# Patient Record
Sex: FEMALE | Race: WHITE | NOT HISPANIC OR LATINO | Employment: OTHER | URBAN - METROPOLITAN AREA
[De-identification: names, ages, dates, MRNs, and addresses within clinical notes are randomized per-mention and may not be internally consistent; named-entity substitution may affect disease eponyms.]

---

## 2017-04-04 DIAGNOSIS — F32.5 MAJOR DEPRESSION IN REMISSION (H): ICD-10-CM

## 2017-04-04 NOTE — TELEPHONE ENCOUNTER
Pt is requesting the following medication    Pending Prescriptions:                       Disp   Refills    FLUoxetine (PROZAC) 20 MG capsule         90 cap*0            Sig: Take 1 capsule (20 mg) by mouth daily    Pt was last seen on 11/18/17- Pt had a physical on 11/9/17 and the medication was last refilled on that time.   Pt is due in May for a recheck on her medication in May.    Pharmacy has been selected.     Annamaria.JESSENIA Manuel ([facility])

## 2017-04-04 NOTE — TELEPHONE ENCOUNTER
Message left for Pt that a 30 day Rx refill was authorized, and pt should schedule a non fasting OV.

## 2017-05-19 ENCOUNTER — OFFICE VISIT (OUTPATIENT)
Dept: FAMILY MEDICINE | Facility: CLINIC | Age: 67
End: 2017-05-19

## 2017-05-19 VITALS
OXYGEN SATURATION: 98 % | HEIGHT: 64 IN | HEART RATE: 73 BPM | TEMPERATURE: 98.3 F | DIASTOLIC BLOOD PRESSURE: 70 MMHG | SYSTOLIC BLOOD PRESSURE: 106 MMHG

## 2017-05-19 DIAGNOSIS — F32.5 MAJOR DEPRESSION IN REMISSION (H): Primary | ICD-10-CM

## 2017-05-19 PROCEDURE — 99213 OFFICE O/P EST LOW 20 MIN: CPT | Performed by: PHYSICIAN ASSISTANT

## 2017-05-19 NOTE — NURSING NOTE
Nataly MARIN Vidhya is here today for a non fasting medication check.    Pre-Visit Screening :  Immunizations : up to date  Colonoscopy : is up to date  Mammogram : is up to date  Asthma Action Test/Plan : NA  PHQ9/GAD7 :  Completed Today  Pulse - regular  My Chart - accepts    CLASSIFICATION OF OVERWEIGHT AND OBESITY BY BMI                         Obesity Class           BMI(kg/m2)  Underweight                                    < 18.5  Normal                                         18.5-24.9  Overweight                                     25.0-29.9  OBESITY                     I                  30.0-34.9                              II                 35.0-39.9  EXTREME OBESITY             III                >40                             Patient's  BMI There is no height or weight on file to calculate BMI.  http://hin.nhlbi.nih.gov/menuplanner/menu.cgi  Questioned patient about current smoking habits.  Pt. quit smoking some time ago.  Cass Fairchild, CMA

## 2017-05-19 NOTE — PROGRESS NOTES
SUBJECTIVE:                                                    Nataly Kee is a 66 year old female who presents to clinic today for the following health issues:    Depression Followup    Status since last visit: Stable     See PHQ-9 for current symptoms.  Other associated symptoms: None    Complicating factors:   Significant life event:  No   Current substance abuse:  None  Anxiety or Panic symptoms:  No    PHQ-9  English PHQ-9   Any Language              Amount of exercise or daily activities, outside of work: Pilates reformer, strength     Problems taking medications regularly No    Medication side effects: No        ROS:  Constitutional, HEENT, cardiovascular, pulmonary, GI and  systems are negative, except as otherwise noted.    Problem list, Medication list, Allergies, and Medical/Social/Surgical histories reviewed in [facility] and updated as appropriate.  Labs reviewed in EPIC  BP Readings from Last 3 Encounters:   05/19/17 106/70   11/18/16 130/90   11/09/16 120/80    Wt Readings from Last 3 Encounters:   11/09/16 81.6 kg (179 lb 12.8 oz)   07/27/16 82.3 kg (181 lb 6.4 oz)   11/19/15 83.1 kg (183 lb 3.2 oz)                  Patient Active Problem List   Diagnosis     Pulmonary embolism and infarction (H)     Hyperlipidemia     Major depression in remission (H)     ACP (advance care planning)     Vitamin D deficiency     Health Care Home     Non morbid obesity due to excess calories     Past Surgical History:   Procedure Laterality Date     C LIGATE FALLOPIAN TUBE  1983     C VAGINAL HYSTERECTOMY  2001    Hysterectomy, Vaginal     COLONOSCOPY  4/2015    tubular adenoma     HC COLONOSCOPY THRU STOMA, DIAGNOSTIC  8/2010    3 mm polyp in descending colon/ Dr Asencio     HC REMOVAL OF TONSILS,12+ Y/O      age 20       Social History   Substance Use Topics     Smoking status: Never Smoker     Smokeless tobacco: Never Used     Alcohol use No     Family History   Problem Relation Age of Onset     HEART DISEASE Mother      " related to chemotherapy treatment; pacemaker     CANCER Mother      hodgekins disease     CEREBROVASCULAR DISEASE Mother      Respiratory Father      emphysema     HEART DISEASE Father      abdominal aneurysm     Multiple Sclerosis Sister      relapsing praveen.      Bipolar Disorder Daughter          Current Outpatient Prescriptions   Medication Sig Dispense Refill     FLUoxetine (PROZAC) 20 MG capsule Take 1 capsule (20 mg) by mouth daily 90 capsule 1     Omeprazole Magnesium (PRILOSEC OTC PO)        Multiple Vitamins-Calcium (ONE-A-DAY WOMENS FORMULA) TABS Take 1 tablet by mouth daily.       Cholecalciferol (VITAMIN D) 1000 UNIT capsule Take 1 capsule by mouth daily.       [DISCONTINUED] FLUoxetine (PROZAC) 20 MG capsule Take 1 capsule (20 mg) by mouth daily 30 capsule 0     Allergies   Allergen Reactions     No Known Drug Allergies      Recent Labs   Lab Test  11/09/16   1119  11/19/15   1040  08/14/14   0927  08/05/13   1247   12/18/10   0600  04/17/10   0200   02/19/10   0400   LDL  180*  164*  154*  145*   < >  134*  122   < >   --    HDL  45*  53  45*  45*   < >  46  44*   < >   --    TRIG  177*  187*  174*  257*   < >  126  106   < >   --    ALT   --    --    --   22   --   17  17   < >   --    CR  0.73   --   0.87  0.80   < >   --    --    < >   --    GFRESTIMATED  85   --   70  78   < >   --    --    < >   --    POTASSIUM  4.5   --   4.9  4.4   < >   --    --    < >   --    TSH   --    --    --    --    --    --    --    --   3.22    < > = values in this interval not displayed.        OBJECTIVE:                                                    /70 (BP Location: Right arm, Patient Position: Chair, Cuff Size: Adult Large)  Pulse 73  Temp 98.3  F (36.8  C) (Oral)  Ht 1.626 m (5' 4\")  LMP 09/03/2001  SpO2 98%  Breastfeeding? No   There is no height or weight on file to calculate BMI.       Mental Status Exam:   Appearance: calm  Grooming: adequately groomed  Demeanor: engaged, cooperative  Affect: " normal  Speech: Normal.  Gait:Normal.  Movements: Normal  Form of thought: Logical, Linear and Goal directed  Thought content:  Normal  Insight:Good   Judgment: Good   Cognition: Good            ASSESSMENT/PLAN:                                                    (F32.5) Major depression in remission (H)  (primary encounter diagnosis)  Plan: FLUoxetine (PROZAC) 20 MG capsule    Stable - FU 6 months at CPE.  I'm not sure why she needed to come back now based on last OV, asked PCP to talk about year rx instead of q6 months if stable.       Latesha Hilario PA-C

## 2017-05-19 NOTE — MR AVS SNAPSHOT
"              After Visit Summary   5/19/2017    Nataly Kee    MRN: [id number]           Patient Information     Date Of Birth          1950        Visit Information        Provider Department      5/19/2017 3:00 PM Latesha Hilario PA [facility]        Today's Diagnoses     Major depression in remission (H)    -  1       Follow-ups after your visit        Who to contact     If you have questions or need follow up information about today's clinic visit or your schedule please contact [facility] directly at [phone].  Normal or non-critical lab and imaging results will be communicated to you by MyChart, letter or phone within 4 business days after the clinic has received the results. If you do not hear from us within 7 days, please contact the clinic through [employer] or phone. If you have a critical or abnormal lab result, we will notify you by phone as soon as possible.  Submit refill requests through [employer] or call your pharmacy and they will forward the refill request to us. Please allow 3 business days for your refill to be completed.          Additional Information About Your Visit        MyChart Information     [employer] gives you secure access to your electronic health record. If you see a primary care provider, you can also send messages to your care team and make appointments. If you have questions, please call your primary care clinic.  If you do not have a primary care provider, please call [phone] and they will assist you.        Care EveryWhere ID     This is your Care EveryWhere ID. This could be used by other organizations to access your [place] medical records  [id number]        Your Vitals Were     Pulse Temperature Height Last Period Pulse Oximetry Breastfeeding?    73 98.3  F (36.8  C) (Oral) 1.626 m (5' 4\") 09/03/2001 98% No       Blood Pressure from Last 3 Encounters:   05/19/17 106/70   11/18/16 130/90   11/09/16 120/80 "    Weight from Last 3 Encounters:   11/09/16 81.6 kg (179 lb 12.8 oz)   07/27/16 82.3 kg (181 lb 6.4 oz)   11/19/15 83.1 kg (183 lb 3.2 oz)              Today, you had the following     No orders found for display         Where to get your medicines      These medications were sent to [place] [address] [place] [facility] [address] [facility]  [address] [facility] [address]    Hours:  Tech issues with their phone system Phone:  [phone]     FLUoxetine 20 MG capsule          Primary Care Provider Office Phone # Fax #    Nedra Lemon [phone]       [facility] [address] [facility] [address]  [facility] [address]        Thank you!     Thank you for choosing [facility]  for your care. Our goal is always to provide you with excellent care. Hearing back from our patients is one way we can continue to improve our services. Please take a few minutes to complete the written survey that you may receive in the mail after your visit with us. Thank you!             Your Updated Medication List - Protect others around you: Learn how to safely use, store and throw away your medicines at www.disposemymeds.org.          This list is accurate as of: 5/19/17  3:08 PM.  Always use your most recent med list.                   Brand Name Dispense Instructions for use    FLUoxetine 20 MG capsule    PROzac    90 capsule    Take 1 capsule (20 mg) by mouth daily       ONE-A-DAY WOMENS FORMULA Tabs      Take 1 tablet by mouth daily.       PRILOSEC OTC PO          vitamin D 1000 UNITS capsule      Take 1 capsule by mouth daily.

## 2017-05-20 ASSESSMENT — PATIENT HEALTH QUESTIONNAIRE - PHQ9: SUM OF ALL RESPONSES TO PHQ QUESTIONS 1-9: 5

## 2017-11-17 DIAGNOSIS — F32.5 MAJOR DEPRESSION IN REMISSION (H): ICD-10-CM

## 2017-11-17 NOTE — TELEPHONE ENCOUNTER
Called #30 of fluoxetine to [facility]. Please contact pt and make a 6 month fu med check appointment, non fasting.    Jenise Vogel MA

## 2017-12-14 DIAGNOSIS — F32.5 MAJOR DEPRESSION IN REMISSION (H): ICD-10-CM

## 2017-12-14 NOTE — TELEPHONE ENCOUNTER
I called the pt to inform her that her medication was sent in and I also told her she needed a CPX so we could recheck her cholesterol as well and she stated that she will make a appointment after the first of the year.

## 2017-12-14 NOTE — TELEPHONE ENCOUNTER
Latesha,    When pt was last in 5-19-17, there is a note that she could possibly do a year RX instead of 6 month.  Do you want the pt to be seen before next refill or can we send in a longer RX so she can make it to next May?    Pending Prescriptions:                       Disp   Refills    FLUoxetine (PROZAC) 20 MG capsule         90 cap*1            Sig: Take 1 capsule (20 mg) by mouth daily    This is the requested medication.    Thank you

## 2017-12-14 NOTE — TELEPHONE ENCOUNTER
I told Nataly that she is due to return for her CPE in November this year (past due).  Dr. Lemon will be out [place] so please let her know that she is due for FASTING CPE with Dr. Lemon or one of the other providers and we really need to recheck her cholesterol soon as it was high.    I will send in 90 day supply of her medication but she needs to RTC for fasting CPE before that runs out.    Thanks,  Latesha Hilario PA-C

## 2018-04-11 ENCOUNTER — TRANSFERRED RECORDS (OUTPATIENT)
Dept: FAMILY MEDICINE | Facility: CLINIC | Age: 68
End: 2018-04-11

## 2018-04-20 ENCOUNTER — OFFICE VISIT (OUTPATIENT)
Dept: FAMILY MEDICINE | Facility: CLINIC | Age: 68
End: 2018-04-20

## 2018-04-20 VITALS
DIASTOLIC BLOOD PRESSURE: 78 MMHG | WEIGHT: 167 LBS | OXYGEN SATURATION: 95 % | HEIGHT: 64 IN | SYSTOLIC BLOOD PRESSURE: 118 MMHG | TEMPERATURE: 97.8 F | BODY MASS INDEX: 28.51 KG/M2 | HEART RATE: 82 BPM

## 2018-04-20 DIAGNOSIS — F32.5 MAJOR DEPRESSION IN REMISSION (H): ICD-10-CM

## 2018-04-20 DIAGNOSIS — Z23 NEED FOR VACCINATION: ICD-10-CM

## 2018-04-20 DIAGNOSIS — Z00.00 ROUTINE HISTORY AND PHYSICAL EXAMINATION OF ADULT: Primary | ICD-10-CM

## 2018-04-20 LAB
ERYTHROCYTE [DISTWIDTH] IN BLOOD BY AUTOMATED COUNT: 12.9 %
GLUCOSE SERPL-MCNC: 91 MG/DL (ref 60–99)
HCT VFR BLD AUTO: 46.7 % (ref 35–47)
HEMOGLOBIN: 14.9 G/DL (ref 11.7–15.7)
MCH RBC QN AUTO: 29.2 PG (ref 26–33)
MCHC RBC AUTO-ENTMCNC: 31.9 G/DL (ref 31–36)
MCV RBC AUTO: 91.6 FL (ref 78–100)
PLATELET COUNT - QUEST: 294 10^9/L (ref 150–375)
RBC # BLD AUTO: 5.1 10*12/L (ref 3.8–5.2)
WBC # BLD AUTO: 6.3 10*9/L (ref 4–11)

## 2018-04-20 PROCEDURE — 80061 LIPID PANEL: CPT | Mod: 90 | Performed by: FAMILY MEDICINE

## 2018-04-20 PROCEDURE — 99397 PER PM REEVAL EST PAT 65+ YR: CPT | Mod: 25 | Performed by: FAMILY MEDICINE

## 2018-04-20 PROCEDURE — 36415 COLL VENOUS BLD VENIPUNCTURE: CPT | Performed by: FAMILY MEDICINE

## 2018-04-20 PROCEDURE — 90471 IMMUNIZATION ADMIN: CPT | Performed by: FAMILY MEDICINE

## 2018-04-20 PROCEDURE — 82947 ASSAY GLUCOSE BLOOD QUANT: CPT | Performed by: FAMILY MEDICINE

## 2018-04-20 PROCEDURE — 85027 COMPLETE CBC AUTOMATED: CPT | Performed by: FAMILY MEDICINE

## 2018-04-20 ASSESSMENT — ANXIETY QUESTIONNAIRES
IF YOU CHECKED OFF ANY PROBLEMS ON THIS QUESTIONNAIRE, HOW DIFFICULT HAVE THESE PROBLEMS MADE IT FOR YOU TO DO YOUR WORK, TAKE CARE OF THINGS AT HOME, OR GET ALONG WITH OTHER PEOPLE: NOT DIFFICULT AT ALL
6. BECOMING EASILY ANNOYED OR IRRITABLE: NOT AT ALL
7. FEELING AFRAID AS IF SOMETHING AWFUL MIGHT HAPPEN: NOT AT ALL
1. FEELING NERVOUS, ANXIOUS, OR ON EDGE: NOT AT ALL
5. BEING SO RESTLESS THAT IT IS HARD TO SIT STILL: NOT AT ALL
2. NOT BEING ABLE TO STOP OR CONTROL WORRYING: NOT AT ALL
3. WORRYING TOO MUCH ABOUT DIFFERENT THINGS: NOT AT ALL
GAD7 TOTAL SCORE: 0

## 2018-04-20 ASSESSMENT — PATIENT HEALTH QUESTIONNAIRE - PHQ9: 5. POOR APPETITE OR OVEREATING: NOT AT ALL

## 2018-04-20 NOTE — NURSING NOTE
Nataly is here for annual physical and is fasting.    Patient is here for a full physical exam.    Pre-Visit Screening :  Immunizations : is due for Tdap today   Colon Screening : is up to date-due in 2020 for next one-every 5 years   Mammogram: up to date   Asthma Action Test/Plan : No concerns   PHQ9 :  Will do PHQ-2 today   GAD7 :  No concerns   Patient's  BMI Body mass index is 28.67 kg/(m^2).  Questioned patient about current smoking habits.  Pt. has never smoked.  OK to leave a detailed voice message regarding today's visit Yes, phone # [phone]      ETOH screening:  Questions:  1-How often do you have a drink containing alcohol?                             0 times per week(s)-patient is a very occasional drinker and will maybe once a month or so   2-How many drinks containing alcohol do you have on a typical day when you are         Drinking?                              1   3- How often do you have 5 or more drinks on one occasion?                              Never

## 2018-04-20 NOTE — MR AVS SNAPSHOT
After Visit Summary   4/20/2018    Nataly Kee    MRN: [id number]           Patient Information     Date Of Birth          1950        Visit Information        Provider Department      4/20/2018 8:00 AM Nedra Lemon MD [facility]        Today's Diagnoses     Routine history and physical examination of adult    -  1    Need for vaccination          Care Instructions    New Recombinant shingles vaccine (Shingrix): call your insurance for information on cost          Follow-ups after your visit        Who to contact     If you have questions or need follow up information about today's clinic visit or your schedule please contact [facility] directly at [phone].  Normal or non-critical lab and imaging results will be communicated to you by MyChart, letter or phone within 4 business days after the clinic has received the results. If you do not hear from us within 7 days, please contact the clinic through [employer] or phone. If you have a critical or abnormal lab result, we will notify you by phone as soon as possible.  Submit refill requests through [employer] or call your pharmacy and they will forward the refill request to us. Please allow 3 business days for your refill to be completed.          Additional Information About Your Visit        MyChart Information     [employer] gives you secure access to your electronic health record. If you see a primary care provider, you can also send messages to your care team and make appointments. If you have questions, please call your primary care clinic.  If you do not have a primary care provider, please call [phone] and they will assist you.        Care EveryWhere ID     This is your Care EveryWhere ID. This could be used by other organizations to access your [place] medical records  [id number]        Your Vitals Were     Pulse Temperature Height Last Period Pulse Oximetry BMI (Body Mass Index)     "82 97.8  F (36.6  C) (Oral) 1.626 m (5' 4\") 09/03/2001 95% 28.67 kg/m2       Blood Pressure from Last 3 Encounters:   04/20/18 118/78   05/19/17 106/70   11/18/16 130/90    Weight from Last 3 Encounters:   04/20/18 75.8 kg (167 lb)   11/09/16 81.6 kg (179 lb 12.8 oz)   07/27/16 82.3 kg (181 lb 6.4 oz)              We Performed the Following     Glucose Fasting (BFP)     HEMOGRAM/PLATELET (BFP)     Lipid Profile     TDAP VACCINE (BOOSTRIX)     VACCINE ADMINISTRATION  NASAL/ORAL     VENOUS COLLECTION        Primary Care Provider Office Phone # Fax #    Nedra Lemon [phone] [address] [address]        Equal Access to Services     [facility]: Hadii luis dempsey hadasho Soomaali, waaxda luqadaha, qaybta kaalmada adeegyada, waxay koin hayjulieta nuñez . So [facility] [place] [phone].    ATENCIÓN: Si habla español, tiene a trujillo disposición servicios gratuitos de asistencia lingüística. Llame al [phone].    We comply with applicable federal civil rights laws and [place] laws. We do not discriminate on the basis of race, color, national origin, age, disability, sex, sexual orientation, or gender identity.            Thank you!     Thank you for choosing [facility]  for your care. Our goal is always to provide you with excellent care. Hearing back from our patients is one way we can continue to improve our services. Please take a few minutes to complete the written survey that you may receive in the mail after your visit with us. Thank you!             Your Updated Medication List - Protect others around you: Learn how to safely use, store and throw away your medicines at www.disposemymeds.org.          This list is accurate as of 4/20/18  8:28 AM.  Always use your most recent med list.                   Brand Name Dispense Instructions for use Diagnosis    FLUoxetine 20 MG capsule    PROzac     Take 20 mg by mouth daily        " ONE-A-DAY WOMENS FORMULA Tabs      Take 1 tablet by mouth daily.        vitamin D 1000 units capsule      Take 1 capsule by mouth daily.

## 2018-04-21 LAB
CHOLEST SERPL-MCNC: 265 MG/DL
CHOLEST/HDLC SERPL: 6.5 (CALC)
HDLC SERPL-MCNC: 41 MG/DL
LDLC SERPL CALC-MCNC: 182 MG/DL (CALC)
NONHDLC SERPL-MCNC: 224 MG/DL (CALC)
TRIGL SERPL-MCNC: 226 MG/DL

## 2018-04-21 ASSESSMENT — PATIENT HEALTH QUESTIONNAIRE - PHQ9: SUM OF ALL RESPONSES TO PHQ QUESTIONS 1-9: 0

## 2018-04-21 ASSESSMENT — ANXIETY QUESTIONNAIRES: GAD7 TOTAL SCORE: 0

## 2018-04-24 PROCEDURE — 90715 TDAP VACCINE 7 YRS/> IM: CPT | Performed by: FAMILY MEDICINE

## 2018-06-04 ENCOUNTER — TRANSFERRED RECORDS (OUTPATIENT)
Dept: FAMILY MEDICINE | Facility: CLINIC | Age: 68
End: 2018-06-04

## 2018-12-03 ENCOUNTER — OFFICE VISIT (OUTPATIENT)
Dept: FAMILY MEDICINE | Facility: CLINIC | Age: 68
End: 2018-12-03

## 2018-12-03 VITALS
SYSTOLIC BLOOD PRESSURE: 106 MMHG | HEIGHT: 64 IN | HEART RATE: 79 BPM | WEIGHT: 169 LBS | TEMPERATURE: 98.3 F | OXYGEN SATURATION: 98 % | BODY MASS INDEX: 28.85 KG/M2 | RESPIRATION RATE: 16 BRPM | DIASTOLIC BLOOD PRESSURE: 80 MMHG

## 2018-12-03 DIAGNOSIS — R05.9 COUGH: ICD-10-CM

## 2018-12-03 DIAGNOSIS — H65.91 OME (OTITIS MEDIA WITH EFFUSION), RIGHT: Primary | ICD-10-CM

## 2018-12-03 DIAGNOSIS — K21.9 GASTROESOPHAGEAL REFLUX DISEASE WITHOUT ESOPHAGITIS: ICD-10-CM

## 2018-12-03 DIAGNOSIS — J06.9 UPPER RESPIRATORY TRACT INFECTION, UNSPECIFIED TYPE: ICD-10-CM

## 2018-12-03 PROCEDURE — 99213 OFFICE O/P EST LOW 20 MIN: CPT | Performed by: FAMILY MEDICINE

## 2018-12-03 RX ORDER — AMOXICILLIN 875 MG
875 TABLET ORAL 2 TIMES DAILY
Qty: 20 TABLET | Refills: 0 | Status: SHIPPED | OUTPATIENT
Start: 2018-12-03 | End: 2019-05-07

## 2018-12-03 RX ORDER — BENZONATATE 100 MG/1
100 CAPSULE ORAL 3 TIMES DAILY PRN
Qty: 21 CAPSULE | Refills: 0 | Status: SHIPPED | OUTPATIENT
Start: 2018-12-03 | End: 2019-05-07

## 2018-12-03 RX ORDER — GUAIFENESIN 600 MG/1
1200 TABLET, EXTENDED RELEASE ORAL 2 TIMES DAILY PRN
Qty: 40 TABLET | Refills: 0 | Status: SHIPPED | OUTPATIENT
Start: 2018-12-03 | End: 2019-05-07

## 2018-12-03 ASSESSMENT — PATIENT HEALTH QUESTIONNAIRE - PHQ9
SUM OF ALL RESPONSES TO PHQ QUESTIONS 1-9: 5
5. POOR APPETITE OR OVEREATING: NOT AT ALL

## 2018-12-03 ASSESSMENT — ANXIETY QUESTIONNAIRES
7. FEELING AFRAID AS IF SOMETHING AWFUL MIGHT HAPPEN: NOT AT ALL
6. BECOMING EASILY ANNOYED OR IRRITABLE: NOT AT ALL
IF YOU CHECKED OFF ANY PROBLEMS ON THIS QUESTIONNAIRE, HOW DIFFICULT HAVE THESE PROBLEMS MADE IT FOR YOU TO DO YOUR WORK, TAKE CARE OF THINGS AT HOME, OR GET ALONG WITH OTHER PEOPLE: NOT DIFFICULT AT ALL
3. WORRYING TOO MUCH ABOUT DIFFERENT THINGS: SEVERAL DAYS
2. NOT BEING ABLE TO STOP OR CONTROL WORRYING: NOT AT ALL
GAD7 TOTAL SCORE: 1
5. BEING SO RESTLESS THAT IT IS HARD TO SIT STILL: NOT AT ALL
1. FEELING NERVOUS, ANXIOUS, OR ON EDGE: NOT AT ALL

## 2018-12-03 NOTE — MR AVS SNAPSHOT
After Visit Summary   12/3/2018    Nataly Kee    MRN: [id number]           Patient Information     Date Of Birth          1950        Visit Information        Provider Department      12/3/2018 12:30 PM Amy Laboy MD [facility]        Today's Diagnoses     OME (otitis media with effusion), right    -  1    Upper respiratory tract infection, unspecified type        Gastroesophageal reflux disease without esophagitis        Cough          Care Instructions    OME (otitis media with effusion), right  (primary encounter diagnosis)  Comment: diagnosis reviewed  Plan: amoxicillin (AMOXIL) 875 MG tablet, guaiFENesin        (MUCINEX) 600 MG 12 hr tablet        Potential medication side effects were discussed with the patient; let me know if any occur.    Symptomatic care with decongestants, fluids, tylenol/advil prn. Use GUAIFENESIN  MG OR TBCR, 1 tab po BID (Twice per day), D: 20, R: 0 for congestion and cough.    In addition, I have suggested that the patient   monitor for symptoms of bacterial infection expecting slow gradual resolution of viral URI as the natural course.      Gastroesophageal reflux disease without esophagitis  Comment: side effects reviewed  Plan: omeprazole (PRILOSEC) 20 MG DR capsule        Consider multivitamin, B12 and Vitamin D daily              Follow-ups after your visit        Follow-up notes from your care team     Return if symptoms worsen or fail to improve.      Who to contact     If you have questions or need follow up information about today's clinic visit or your schedule please contact [facility] directly at [phone].  Normal or non-critical lab and imaging results will be communicated to you by MyChart, letter or phone within 4 business days after the clinic has received the results. If you do not hear from us within 7 days, please contact the clinic through MyChart or phone. If you have a critical  "or abnormal lab result, we will notify you by phone as soon as possible.  Submit refill requests through [employer] or call your pharmacy and they will forward the refill request to us. Please allow 3 business days for your refill to be completed.          Additional Information About Your Visit        [employer] Information     [employer] gives you secure access to your electronic health record. If you see a primary care provider, you can also send messages to your care team and make appointments. If you have questions, please call your primary care clinic.  If you do not have a primary care provider, please call [phone] and they will assist you.        Care EveryWhere ID     This is your Care EveryWhere ID. This could be used by other organizations to access your [facility] medical records  [id number]        Your Vitals Were     Pulse Temperature Respirations Height Last Period Pulse Oximetry    79 98.3  F (36.8  C) (Oral) 16 1.626 m (5' 4\") 09/03/2001 98%    BMI (Body Mass Index)                   29.01 kg/m2            Blood Pressure from Last 3 Encounters:   12/03/18 106/80   04/20/18 118/78   05/19/17 106/70    Weight from Last 3 Encounters:   12/03/18 76.7 kg (169 lb)   04/20/18 75.8 kg (167 lb)   11/09/16 81.6 kg (179 lb 12.8 oz)              Today, you had the following     No orders found for display         Today's Medication Changes          These changes are accurate as of 12/3/18  1:14 PM.  If you have any questions, ask your nurse or doctor.               Start taking these medicines.        Dose/Directions    amoxicillin 875 MG tablet   Commonly known as:  AMOXIL   Used for:  OME (otitis media with effusion), right   Started by:  Amy Laboy MD        Dose:  875 mg   Take 1 tablet (875 mg) by mouth 2 times daily   Quantity:  20 tablet   Refills:  0       benzonatate 100 MG capsule   Commonly known as:  TESSALON   Used for:  Cough, Upper respiratory tract infection, unspecified type   Started by:  " Amy Laboy MD        Dose:  100 mg   Take 1 capsule (100 mg) by mouth 3 times daily as needed for cough   Quantity:  21 capsule   Refills:  0       guaiFENesin 600 MG 12 hr tablet   Commonly known as:  MUCINEX   Used for:  Upper respiratory tract infection, unspecified type, OME (otitis media with effusion), right   Started by:  Amy Laboy MD        Dose:  1200 mg   Take 2 tablets (1,200 mg) by mouth 2 times daily as needed for congestion   Quantity:  40 tablet   Refills:  0       omeprazole 20 MG DR capsule   Commonly known as:  priLOSEC   Used for:  Gastroesophageal reflux disease without esophagitis   Started by:  Amy Laboy MD        Dose:  20 mg   Take 1 capsule (20 mg) by mouth daily   Refills:  0            Where to get your medicines      These medications were sent to [place] [id number] [address]    Hours:  Tech issues with their phone system Phone:  [phone]     amoxicillin 875 MG tablet    benzonatate 100 MG capsule    guaiFENesin 600 MG 12 hr tablet         Some of these will need a paper prescription and others can be bought over the counter.  Ask your nurse if you have questions.     You don't need a prescription for these medications     omeprazole 20 MG DR capsule                Primary Care Provider Office Phone # Fax #    Nedra Lemon [phone] [address] [address]        Equal Access to Services     [facility]: Hadii luis dempsey hadasho Soarelisali, waaxda luqadaha, qaybta kaalmada adeegyada, justo tolbert. So [facility] [phone].    ATENCIÓN: Si habla español, tiene a trujillo disposición servicios gratuitos de asistencia lingüística. Llame al [phone].    We comply with applicable federal civil rights laws and [place] laws. We do not discriminate on the basis of race, color, national origin, age, disability, sex, sexual  orientation, or gender identity.            Thank you!     Thank you for choosing [facility]  for your care. Our goal is always to provide you with excellent care. Hearing back from our patients is one way we can continue to improve our services. Please take a few minutes to complete the written survey that you may receive in the mail after your visit with us. Thank you!             Your Updated Medication List - Protect others around you: Learn how to safely use, store and throw away your medicines at www.disposemymeds.org.          This list is accurate as of 12/3/18  1:14 PM.  Always use your most recent med list.                   Brand Name Dispense Instructions for use Diagnosis    amoxicillin 875 MG tablet    AMOXIL    20 tablet    Take 1 tablet (875 mg) by mouth 2 times daily    OME (otitis media with effusion), right       benzonatate 100 MG capsule    TESSALON    21 capsule    Take 1 capsule (100 mg) by mouth 3 times daily as needed for cough    Cough, Upper respiratory tract infection, unspecified type       FLUoxetine 20 MG capsule    PROzac    90 capsule    Take 1 capsule (20 mg) by mouth daily    Major depression in remission (H)       guaiFENesin 600 MG 12 hr tablet    MUCINEX    40 tablet    Take 2 tablets (1,200 mg) by mouth 2 times daily as needed for congestion    Upper respiratory tract infection, unspecified type, OME (otitis media with effusion), right       omeprazole 20 MG DR capsule    priLOSEC     Take 1 capsule (20 mg) by mouth daily    Gastroesophageal reflux disease without esophagitis       ONE-A-DAY WOMENS FORMULA Tabs      Take 1 tablet by mouth daily.        vitamin D 1000 units capsule      Take 1 capsule by mouth daily.

## 2018-12-03 NOTE — PROGRESS NOTES
SUBJECTIVE: 68 year old female complaining of nasal congestion with sinus drainage for 2 week(s).   The patient describes started to feel better and got her flu shot 7 days ago. Coughing worsened with productive phlegm, unable to lay down at night, clear phlegm. Ear pain and throat pain  The patient denies a history of fever, GI symptoms or rash.   Smoking history: No.   Relevant past medical history: positive for depression. Daily GERD using omeprazole OTC     ROS: 10 point ROS neg other than the symptoms noted above in the HPI.  Current Outpatient Prescriptions   Medication     amoxicillin (AMOXIL) 875 MG tablet     benzonatate (TESSALON) 100 MG capsule     Cholecalciferol (VITAMIN D) 1000 UNIT capsule     FLUoxetine (PROZAC) 20 MG capsule     guaiFENesin (MUCINEX) 600 MG 12 hr tablet     Multiple Vitamins-Calcium (ONE-A-DAY WOMENS FORMULA) TABS     omeprazole (PRILOSEC) 20 MG DR capsule     No current facility-administered medications for this visit.          OBJECTIVE: The patient appears healthy, alert, no distress, cooperative and fatigued.   EARS: right TM erythematous and bulging  NOSE/SINUS: positive findings: mucosa erythematous and swollen, clear rhinorrhea   THROAT: normal and post nasal drainage   NECK:Neck supple. No adenopathy. Thyroid symmetric, normal size,, Carotids without bruits.   CHEST: Clear    ASSESSMENT: (H65.91) OME (otitis media with effusion), right  (primary encounter diagnosis)  Comment: diagnosis reviewed  Plan: amoxicillin (AMOXIL) 875 MG tablet, guaiFENesin        (MUCINEX) 600 MG 12 hr tablet        Potential medication side effects were discussed with the patient; let me know if any occur.      (J06.9) Upper respiratory tract infection, unspecified type  Plan: guaiFENesin (MUCINEX) 600 MG 12 hr tablet,         benzonatate (TESSALON) 100 MG capsule        Symptomatic care with decongestants, fluids, tylenol/advil prn. Use GUAIFENESIN  MG OR TBCR, 1 tab po BID (Twice per day),  D: 20, R: 0 for congestion and cough.    In addition, I have suggested that the patient   monitor for symptoms of bacterial infection expecting slow gradual resolution of viral URI as the natural course.      (K21.9) Gastroesophageal reflux disease without esophagitis  Comment: side effects reviewed  Plan: omeprazole (PRILOSEC) 20 MG DR capsule        Consider multivitamin, B12 and Vitamin D daily    (R05) Cough  Plan: benzonatate (TESSALON) 100 MG capsule        As above.

## 2018-12-03 NOTE — NURSING NOTE
Patient is here due to having a bad cough and congestion that she first started noticing last Tuesday. She is having very bad cough attacks that she feels she is coughing up stuff and is having some muscle aches in her arms that she has not really had before. She did get the flu shot last Monday and is not sure if this is what is causing the symptoms or not.    Pre-visit Screening:  Immunizations:  up to date  Colonoscopy:  is up to date  Mammogram: is up to date  Asthma Action Test/Plan:  Just has a bad cough at this time   PHQ9:  Given today   GAD7:  Given today   Questioned patient about current smoking habits Pt. has never smoked.  Ok to leave detailed message on voice mail for today's visit only Yes, phone # [phone]

## 2018-12-03 NOTE — PATIENT INSTRUCTIONS
OME (otitis media with effusion), right  (primary encounter diagnosis)  Comment: diagnosis reviewed  Plan: amoxicillin (AMOXIL) 875 MG tablet, guaiFENesin        (MUCINEX) 600 MG 12 hr tablet        Potential medication side effects were discussed with the patient; let me know if any occur.    Symptomatic care with decongestants, fluids, tylenol/advil prn. Use GUAIFENESIN  MG OR TBCR, 1 tab po BID (Twice per day), D: 20, R: 0 for congestion and cough.    In addition, I have suggested that the patient   monitor for symptoms of bacterial infection expecting slow gradual resolution of viral URI as the natural course.      Gastroesophageal reflux disease without esophagitis  Comment: side effects reviewed  Plan: omeprazole (PRILOSEC) 20 MG DR capsule        Consider multivitamin, B12 and Vitamin D daily

## 2018-12-04 ASSESSMENT — ANXIETY QUESTIONNAIRES: GAD7 TOTAL SCORE: 1

## 2019-04-22 ENCOUNTER — TELEPHONE (OUTPATIENT)
Dept: FAMILY MEDICINE | Facility: CLINIC | Age: 69
End: 2019-04-22

## 2019-04-22 DIAGNOSIS — F32.5 MAJOR DEPRESSION IN REMISSION (H): ICD-10-CM

## 2019-04-22 NOTE — TELEPHONE ENCOUNTER
30 day of Fluoxetine 20MG sent to [facility] in [facility]. Pt due for OV. Please call to schedule.    Thanks, Mary Carmen

## 2019-05-07 ENCOUNTER — OFFICE VISIT (OUTPATIENT)
Dept: FAMILY MEDICINE | Facility: CLINIC | Age: 69
End: 2019-05-07

## 2019-05-07 VITALS
DIASTOLIC BLOOD PRESSURE: 80 MMHG | OXYGEN SATURATION: 94 % | TEMPERATURE: 98.4 F | BODY MASS INDEX: 28.63 KG/M2 | WEIGHT: 166.8 LBS | HEART RATE: 83 BPM | SYSTOLIC BLOOD PRESSURE: 116 MMHG

## 2019-05-07 DIAGNOSIS — F32.5 MAJOR DEPRESSION IN REMISSION (H): Primary | ICD-10-CM

## 2019-05-07 DIAGNOSIS — K21.9 GASTROESOPHAGEAL REFLUX DISEASE WITHOUT ESOPHAGITIS: ICD-10-CM

## 2019-05-07 PROCEDURE — 99213 OFFICE O/P EST LOW 20 MIN: CPT | Performed by: PHYSICIAN ASSISTANT

## 2019-05-07 ASSESSMENT — ANXIETY QUESTIONNAIRES
2. NOT BEING ABLE TO STOP OR CONTROL WORRYING: NOT AT ALL
3. WORRYING TOO MUCH ABOUT DIFFERENT THINGS: NOT AT ALL
IF YOU CHECKED OFF ANY PROBLEMS ON THIS QUESTIONNAIRE, HOW DIFFICULT HAVE THESE PROBLEMS MADE IT FOR YOU TO DO YOUR WORK, TAKE CARE OF THINGS AT HOME, OR GET ALONG WITH OTHER PEOPLE: NOT DIFFICULT AT ALL
7. FEELING AFRAID AS IF SOMETHING AWFUL MIGHT HAPPEN: NOT AT ALL
6. BECOMING EASILY ANNOYED OR IRRITABLE: NOT AT ALL
5. BEING SO RESTLESS THAT IT IS HARD TO SIT STILL: NOT AT ALL
1. FEELING NERVOUS, ANXIOUS, OR ON EDGE: NOT AT ALL
GAD7 TOTAL SCORE: 0

## 2019-05-07 ASSESSMENT — PATIENT HEALTH QUESTIONNAIRE - PHQ9
SUM OF ALL RESPONSES TO PHQ QUESTIONS 1-9: 4
5. POOR APPETITE OR OVEREATING: NOT AT ALL

## 2019-05-07 NOTE — PROGRESS NOTES
SUBJECTIVE:   Nataly Kee is a 68 year old female who presents to clinic today for the following   health issues:      Depression Followup    Status since last visit: Stable     See PHQ-9 for current symptoms.  Other associated symptoms: None    Complicating factors:   Significant life event:  No   Current substance abuse:  None  Anxiety or Panic symptoms:  No    PHQ 5/19/2017 4/20/2018 12/3/2018   PHQ-9 Total Score 5 0 5   Q9: Thoughts of better off dead/self-harm past 2 weeks Not at all Not at all Not at all     In the past two weeks have you had thoughts of suicide or self-harm?  No.    Do you have concerns about your personal safety or the safety of others?   No  PHQ-9  English  PHQ-9   Any Language  Suicide Assessment Five-step Evaluation and Treatment (SAFE-T)      Amount of exercise or physical activity: [employer] 3-5 x a week    Problems taking medications regularly: No    Medication side effects: none    Diet: regular (no restrictions)    GERD Follow up:    Current medication(s): Omeprazole  Previous Medications (s):  none  EGD history: none  Are symptoms controlled on current therapy?  yes    The patient has been counseled about risks of long term PPI use including risks of C. Diff, decreased bone density and memory loss.                 -------------------------------------    Additional history: as documented    Reviewed  and updated as needed this visit by clinical staff  Tobacco  Allergies  Problems         Reviewed and updated as needed this visit by Provider         Patient Active Problem List   Diagnosis     Pulmonary embolism and infarction (H)     Hyperlipidemia     Major depression in remission (H)     ACP (advance care planning)     Vitamin D deficiency     Health Care Home     Non morbid obesity due to excess calories     Past Surgical History:   Procedure Laterality Date     C LIGATE FALLOPIAN TUBE  1983     C VAGINAL HYSTERECTOMY  2001    Hysterectomy, Vaginal     COLONOSCOPY  4/2015     tubular adenoma     HC COLONOSCOPY THRU STOMA, DIAGNOSTIC  8/2010    3 mm polyp in descending colon/ Dr Asencio     HC REMOVAL OF TONSILS,12+ Y/O      age 20       Social History     Tobacco Use     Smoking status: Never Smoker     Smokeless tobacco: Never Used   Substance Use Topics     Alcohol use: No     Alcohol/week: 0.0 oz     Family History   Problem Relation Age of Onset     Heart Disease Mother         related to chemotherapy treatment; pacemaker     Cancer Mother         hodgekins disease     Cerebrovascular Disease Mother      Respiratory Father         emphysema     Heart Disease Father         abdominal aneurysm     Multiple Sclerosis Sister         relapsing praveen.      Bipolar Disorder Daughter          Current Outpatient Medications   Medication Sig Dispense Refill     Cholecalciferol (VITAMIN D) 1000 UNIT capsule Take 1 capsule by mouth daily.       FLUoxetine (PROZAC) 20 MG capsule Take 1 capsule (20 mg) by mouth daily 90 capsule 3     Multiple Vitamins-Calcium (ONE-A-DAY WOMENS FORMULA) TABS Take 1 tablet by mouth daily.       omeprazole (PRILOSEC) 20 MG DR capsule Take 1 capsule (20 mg) by mouth daily       ranitidine (ZANTAC) 150 MG tablet Take 1 tablet (150 mg) by mouth 2 times daily 180 tablet 3     Allergies   Allergen Reactions     No Known Drug Allergies      BP Readings from Last 3 Encounters:   05/07/19 116/80   12/03/18 106/80   04/20/18 118/78    Wt Readings from Last 3 Encounters:   05/07/19 75.7 kg (166 lb 12.8 oz)   12/03/18 76.7 kg (169 lb)   04/20/18 75.8 kg (167 lb)                    ROS:  Constitutional, HEENT, cardiovascular, pulmonary, gi and gu systems are negative, except as otherwise noted.    OBJECTIVE:     /80 (BP Location: Left arm, Patient Position: Sitting)   Pulse 83   Temp 98.4  F (36.9  C) (Oral)   Wt 75.7 kg (166 lb 12.8 oz)   LMP 09/03/2001   SpO2 94%   BMI 28.63 kg/m    Body mass index is 28.63 kg/m .     Mental Status Exam:   Appearance: calm  Grooming:  "adequately groomed  Demeanor: engaged, cooperative  Affect: normal  Speech: Normal.  Gait:Normal.  Movements: Normal  Form of thought: Logical, Linear and Goal directed  Thought content:  Normal  Insight:Good   Judgment: Good   Cognition: Good       ASSESSMENT/PLAN:       BMI:   Estimated body mass index is 28.63 kg/m  as calculated from the following:    Height as of 12/3/18: 1.626 m (5' 4\").    Weight as of this encounter: 75.7 kg (166 lb 12.8 oz).   Weight management plan: Discussed healthy diet and exercise guidelines      (F32.5) Major depression in remission (H)  (primary encounter diagnosis)  Comment: controlled  Plan: FLUoxetine (PROZAC) 20 MG capsule            (K21.9) Gastroesophageal reflux disease without esophagitis  Comment: stable  Plan: ranitidine (ZANTAC) 150 MG tablet          Advised long term AE of omeprazole including decreased bone density, memory loss, and correlation with C. Diff.    Add ranitidine 150 mg bid indefinitely and take PPI every other day for 1 month then stop PPI  If this doesn't control sx. To start omeprazole again.       Pt will RTC fasting CPE with PCP      RADHA Brown  [facility]        "

## 2019-05-07 NOTE — NURSING NOTE
Nataly is here for med check    Pre-visit Screening:  Immunizations:  up to date  Colonoscopy:  is up to date  Mammogram: is up to date  Asthma Action Test/Plan:  NA  PHQ9:  Done today  GAD7:  Done today  Questioned patient about current smoking habits Pt. has never smoked.  Ok to leave detailed message on voice mail for today's visit only Yes, phone # [phone]

## 2019-05-08 ASSESSMENT — ANXIETY QUESTIONNAIRES: GAD7 TOTAL SCORE: 0

## 2019-06-06 ENCOUNTER — OFFICE VISIT (OUTPATIENT)
Dept: FAMILY MEDICINE | Facility: CLINIC | Age: 69
End: 2019-06-06

## 2019-06-06 VITALS
BODY MASS INDEX: 27.31 KG/M2 | DIASTOLIC BLOOD PRESSURE: 82 MMHG | RESPIRATION RATE: 20 BRPM | HEART RATE: 72 BPM | HEIGHT: 64 IN | SYSTOLIC BLOOD PRESSURE: 106 MMHG | WEIGHT: 160 LBS | OXYGEN SATURATION: 98 %

## 2019-06-06 DIAGNOSIS — Z00.00 ROUTINE HISTORY AND PHYSICAL EXAMINATION OF ADULT: Primary | ICD-10-CM

## 2019-06-06 DIAGNOSIS — E78.2 MIXED HYPERLIPIDEMIA: ICD-10-CM

## 2019-06-06 LAB
BUN SERPL-MCNC: 14 MG/DL (ref 7–25)
CALCIUM SERPL-MCNC: 9.9 MG/DL (ref 8.6–10.3)
CHLORIDE SERPLBLD-SCNC: 106.9 MMOL/L (ref 98–110)
CHOLEST SERPL-MCNC: 235 MG/DL (ref 0–199)
CHOLEST/HDLC SERPL: 5 {RATIO} (ref 0–5)
CO2 SERPL-SCNC: 29.2 MMOL/L (ref 20–32)
CREAT SERPL-MCNC: 0.87 MG/DL (ref 0.7–1.18)
GLUCOSE SERPL-MCNC: 94 MG/DL (ref 60–99)
HDLC SERPL-MCNC: 49 MG/DL (ref 40–150)
LDLC SERPL CALC-MCNC: 165 MG/DL (ref 0–99)
POTASSIUM SERPL-SCNC: 4.92 MMOL/L (ref 3.5–5.3)
SODIUM SERPL-SCNC: 140.9 MMOL/L (ref 135–146)
TRIGL SERPL-MCNC: 106 MG/DL (ref 0–149)

## 2019-06-06 PROCEDURE — 80061 LIPID PANEL: CPT | Performed by: FAMILY MEDICINE

## 2019-06-06 PROCEDURE — 99397 PER PM REEVAL EST PAT 65+ YR: CPT | Performed by: FAMILY MEDICINE

## 2019-06-06 PROCEDURE — 36415 COLL VENOUS BLD VENIPUNCTURE: CPT | Performed by: FAMILY MEDICINE

## 2019-06-06 PROCEDURE — 99212 OFFICE O/P EST SF 10 MIN: CPT | Mod: 25 | Performed by: FAMILY MEDICINE

## 2019-06-06 PROCEDURE — 80048 BASIC METABOLIC PNL TOTAL CA: CPT | Performed by: FAMILY MEDICINE

## 2019-06-06 ASSESSMENT — MIFFLIN-ST. JEOR: SCORE: 1235.76
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
## 2019-06-06 NOTE — NURSING NOTE
Patient is here for a full physical exam.    Pre-Visit Screening :  Immunizations : up to date  Colon Screening : is up to date  Mammogram: up to date   Asthma Action Test/Plan : No concerns  PHQ9 :  NA  GAD7 :  NA  Patient's  BMI There is no height or weight on file to calculate BMI.  Questioned patient about current smoking habits.  Pt. quit smoking some time ago.  OK to leave a detailed voice message regarding today's visit Yes, phone # [phone]

## 2019-09-27 ENCOUNTER — HEALTH MAINTENANCE LETTER (OUTPATIENT)
Age: 69
End: 2019-09-27

## 2019-12-16 ENCOUNTER — TRANSFERRED RECORDS (OUTPATIENT)
Dept: FAMILY MEDICINE | Facility: CLINIC | Age: 69
End: 2019-12-16

## 2020-02-26 ENCOUNTER — TRANSFERRED RECORDS (OUTPATIENT)
Dept: FAMILY MEDICINE | Facility: CLINIC | Age: 70
End: 2020-02-26

## 2020-02-26 LAB — MAMMOGRAM: NORMAL

## 2020-06-02 DIAGNOSIS — K21.9 GASTROESOPHAGEAL REFLUX DISEASE WITHOUT ESOPHAGITIS: ICD-10-CM

## 2020-06-02 DIAGNOSIS — F32.5 MAJOR DEPRESSION IN REMISSION (H): ICD-10-CM

## 2020-06-02 NOTE — TELEPHONE ENCOUNTER
Nataly Kee is requesting a refill of:    Refused Prescriptions:                       Disp   Refills    ranitidine (ZANTAC) 150 MG tablet [Pharmac*180 ta*3        Sig: TAKE 1 TABLET BY MOUTH 2 TIMES DAILY  Refused By: PAIGE MONTENEGRO  Reason for Refusal: Patient needs appointment    FLUoxetine (PROZAC) 20 MG capsule [Pharmac*90 cap*3        Sig: TAKE 1 CAPSULE BY MOUTH EVERY DAY  Refused By: PAIGE MONTENEGRO  Reason for Refusal: Patient needs appointment      Pt last seen 06/06/19, due for yearly OV

## 2020-06-15 ENCOUNTER — OFFICE VISIT (OUTPATIENT)
Dept: FAMILY MEDICINE | Facility: CLINIC | Age: 70
End: 2020-06-15

## 2020-06-15 VITALS
WEIGHT: 172 LBS | HEIGHT: 64 IN | DIASTOLIC BLOOD PRESSURE: 76 MMHG | HEART RATE: 86 BPM | BODY MASS INDEX: 29.37 KG/M2 | OXYGEN SATURATION: 98 % | RESPIRATION RATE: 18 BRPM | TEMPERATURE: 98.5 F | SYSTOLIC BLOOD PRESSURE: 104 MMHG

## 2020-06-15 DIAGNOSIS — F32.5 MAJOR DEPRESSION IN REMISSION (H): ICD-10-CM

## 2020-06-15 PROCEDURE — 99213 OFFICE O/P EST LOW 20 MIN: CPT | Performed by: FAMILY MEDICINE

## 2020-06-15 SDOH — ECONOMIC STABILITY: TRANSPORTATION INSECURITY
IN THE PAST 12 MONTHS, HAS LACK OF TRANSPORTATION KEPT YOU FROM MEETINGS, WORK, OR FROM GETTING THINGS NEEDED FOR DAILY LIVING?: NO

## 2020-06-15 SDOH — ECONOMIC STABILITY: FOOD INSECURITY: WITHIN THE PAST 12 MONTHS, THE FOOD YOU BOUGHT JUST DIDN'T LAST AND YOU DIDN'T HAVE MONEY TO GET MORE.: NEVER TRUE

## 2020-06-15 SDOH — ECONOMIC STABILITY: FOOD INSECURITY: WITHIN THE PAST 12 MONTHS, YOU WORRIED THAT YOUR FOOD WOULD RUN OUT BEFORE YOU GOT MONEY TO BUY MORE.: NEVER TRUE

## 2020-06-15 SDOH — ECONOMIC STABILITY: TRANSPORTATION INSECURITY
IN THE PAST 12 MONTHS, HAS THE LACK OF TRANSPORTATION KEPT YOU FROM MEDICAL APPOINTMENTS OR FROM GETTING MEDICATIONS?: NO

## 2020-06-15 SDOH — ECONOMIC STABILITY: INCOME INSECURITY: HOW HARD IS IT FOR YOU TO PAY FOR THE VERY BASICS LIKE FOOD, HOUSING, MEDICAL CARE, AND HEATING?: NOT HARD AT ALL

## 2020-06-15 ASSESSMENT — MIFFLIN-ST. JEOR: SCORE: 1285.19

## 2020-06-15 ASSESSMENT — PATIENT HEALTH QUESTIONNAIRE - PHQ9: SUM OF ALL RESPONSES TO PHQ QUESTIONS 1-9: 3

## 2020-06-15 NOTE — PATIENT INSTRUCTIONS
Major depression in remission (H)  Comment: I've explained to her that drugs of the SSRI class can have side effects such as weight gain, sexual dysfunction, insomnia, headache, nausea. These medications are generally effective at alleviating symptoms of anxiety and/or depression. Let me know if significant side effects do occur.    Plan: FLUoxetine (PROZAC) 20 MG capsule        Recheck 1 year.

## 2020-06-15 NOTE — NURSING NOTE
Nataly is here today for a med recheck.    Pre-visit Screening:  Immunizations:  up to date  Colonoscopy:  is up to date  Mammogram: is up to date  Asthma Action Test/Plan:  NA  PHQ9:  Done today  GAD7:  Done today  Questioned patient about current smoking habits Pt. quit smoking some time ago.  Ok to leave detailed message on voice mail for today's visit only Yes, phone # [phone]

## 2020-06-15 NOTE — PROGRESS NOTES
"SUBJECTIVE:  Nataly Kee is an 69 year old female who presents for follow-up   of depressive symptoms.  Initially evaluated  with lots of life stressors.  Notes from that visit reviewed.  Current symptoms include   Stopped exercise and eating poorly due to gym closed/ epidemic. Symptoms that have subjectively improved include depressed mood, hopelessness, diminished interest or pleasure in activities, psychomotor agitation (restless and /or feeling on edge), fatigue, feelings of worthlessness, anxiety, irritablility, sleep disturbance, difficulty falling asleep.  Previous and current treatment modalities   employed include individual therapy and medication(s) Prozac (fluoxetine).     Organic causes of depression present: strong family history    Current Outpatient Medications   Medication     Cholecalciferol (VITAMIN D) 1000 UNIT capsule     FLUoxetine (PROZAC) 20 MG capsule     omeprazole (PRILOSEC) 20 MG DR capsule     No current facility-administered medications for this visit.      Allergies   Allergen Reactions     No Known Drug Allergies      Side effects of medication: none    Social History     Tobacco Use     Smoking status: Former Smoker     Last attempt to quit: 1975     Years since quittin.4     Smokeless tobacco: Never Used   Substance Use Topics     Alcohol use: No     Alcohol/week: 0.0 standard drinks         Neurologic: negative  Psychiatric:  and doing well  Endocrine: negative    OBJECTIVE:  /76 (BP Location: Left arm, Patient Position: Sitting, Cuff Size: Adult Large)   Pulse 86   Temp 98.5  F (36.9  C) (Oral)   Ht 1.626 m (5' 4\")   Wt 78 kg (172 lb)   LMP 2001   SpO2 98%   BMI 29.52 kg/m    Mental Status Examination  Posture and motor behavior: negative  Dress, grooming, personal hygiene: negative  Facial expression: negative  Speech: negative  Mood: negative  Coherency and relevance of thought: negative  Thought content: negative  Perceptions: " negative  Orientation: negative  Attention and concentration: negative  Memory: : negative  Information: negative  Vocabulary: negative  Abstract reasoning: negative  Judgment: negative    General appearance: healthy, alert, no distress, cooperative, smiling and over weight  Eyes: conjunctivae/corneas clear. PERRL, EOM's intact. Fundi benign  Ears: negative  Oropharynx: Lips, mucosa, and tongue normal. Teeth and gums normal.  Neck: Neck supple. No adenopathy. Thyroid symmetric, normal size,, Carotids without bruits.  Lungs: negative, Percussion normal. Good diaphragmatic excursion. Lungs clear  Heart: negative, PMI normal. No lifts, heaves, or thrills. RRR. No murmurs, clicks gallops or rub  Abdomen: Abdomen soft, non-tender. BS normal. No masses, organomegaly  Neuro: Gait normal. Reflexes normal and symmetric. Sensation grossly WNL.  BMI : Body mass index is 29.52 kg/m .    ASSESSMENT:(F32.5) Major depression in remission (H)  Comment: I've explained to her that drugs of the SSRI class can have side effects such as weight gain, sexual dysfunction, insomnia, headache, nausea. These medications are generally effective at alleviating symptoms of anxiety and/or depression. Let me know if significant side effects do occur.    Plan: FLUoxetine (PROZAC) 20 MG capsule        Recheck 1 year.

## 2020-07-21 DIAGNOSIS — Z11.59 ENCOUNTER FOR SCREENING FOR OTHER VIRAL DISEASES: Primary | ICD-10-CM

## 2020-07-31 DIAGNOSIS — Z11.59 ENCOUNTER FOR SCREENING FOR OTHER VIRAL DISEASES: ICD-10-CM

## 2020-07-31 PROCEDURE — U0003 INFECTIOUS AGENT DETECTION BY NUCLEIC ACID (DNA OR RNA); SEVERE ACUTE RESPIRATORY SYNDROME CORONAVIRUS 2 (SARS-COV-2) (CORONAVIRUS DISEASE [COVID-19]), AMPLIFIED PROBE TECHNIQUE, MAKING USE OF HIGH THROUGHPUT TECHNOLOGIES AS DESCRIBED BY CMS-2020-01-R: HCPCS | Performed by: INTERNAL MEDICINE

## 2020-08-01 LAB
SARS-COV-2 RNA SPEC QL NAA+PROBE: NOT DETECTED
SPECIMEN SOURCE: NORMAL

## 2020-08-02 ENCOUNTER — MYC MEDICAL ADVICE (OUTPATIENT)
Dept: FAMILY MEDICINE | Facility: CLINIC | Age: 70
End: 2020-08-02

## 2020-08-02 DIAGNOSIS — K21.9 GASTROESOPHAGEAL REFLUX DISEASE WITHOUT ESOPHAGITIS: Primary | ICD-10-CM

## 2020-08-03 RX ORDER — FAMOTIDINE 20 MG/1
20 TABLET, FILM COATED ORAL 2 TIMES DAILY
Qty: 180 TABLET | Refills: 3 | Status: SHIPPED | OUTPATIENT
Start: 2020-08-03 | End: 2021-08-10

## 2020-08-03 NOTE — TELEPHONE ENCOUNTER
Routing to [facility] for review. Are you able to send in a prescription for omeprazole?

## 2020-08-04 ENCOUNTER — HOSPITAL ENCOUNTER (OUTPATIENT)
Facility: CLINIC | Age: 70
Discharge: HOME | End: 2020-08-04
Attending: INTERNAL MEDICINE | Admitting: INTERNAL MEDICINE
Payer: MEDICARE

## 2020-08-04 VITALS
DIASTOLIC BLOOD PRESSURE: 86 MMHG | RESPIRATION RATE: 18 BRPM | SYSTOLIC BLOOD PRESSURE: 117 MMHG | OXYGEN SATURATION: 96 % | HEART RATE: 79 BPM

## 2020-08-04 LAB — COLONOSCOPY: NORMAL

## 2020-08-04 PROCEDURE — 88305 TISSUE EXAM BY PATHOLOGIST: CPT | Mod: 26 | Performed by: INTERNAL MEDICINE

## 2020-08-04 PROCEDURE — 25000128 H RX IP 250 OP 636: Performed by: INTERNAL MEDICINE

## 2020-08-04 PROCEDURE — 88305 TISSUE EXAM BY PATHOLOGIST: CPT | Performed by: INTERNAL MEDICINE

## 2020-08-04 PROCEDURE — 45385 COLONOSCOPY W/LESION REMOVAL: CPT | Performed by: INTERNAL MEDICINE

## 2020-08-04 PROCEDURE — G0500 MOD SEDAT ENDO SERVICE >5YRS: HCPCS | Performed by: INTERNAL MEDICINE

## 2020-08-04 RX ORDER — ONDANSETRON 4 MG/1
4 TABLET, ORALLY DISINTEGRATING ORAL EVERY 6 HOURS PRN
Status: DISCONTINUED | OUTPATIENT
Start: 2020-08-04 | End: 2020-08-04 | Stop reason: HOSPADM

## 2020-08-04 RX ORDER — ONDANSETRON 2 MG/ML
4 INJECTION INTRAMUSCULAR; INTRAVENOUS
Status: DISCONTINUED | OUTPATIENT
Start: 2020-08-04 | End: 2020-08-04 | Stop reason: HOSPADM

## 2020-08-04 RX ORDER — ONDANSETRON 2 MG/ML
4 INJECTION INTRAMUSCULAR; INTRAVENOUS EVERY 6 HOURS PRN
Status: DISCONTINUED | OUTPATIENT
Start: 2020-08-04 | End: 2020-08-04 | Stop reason: HOSPADM

## 2020-08-04 RX ORDER — NALOXONE HYDROCHLORIDE 0.4 MG/ML
.1-.4 INJECTION, SOLUTION INTRAMUSCULAR; INTRAVENOUS; SUBCUTANEOUS
Status: DISCONTINUED | OUTPATIENT
Start: 2020-08-04 | End: 2020-08-04 | Stop reason: HOSPADM

## 2020-08-04 RX ORDER — FLUMAZENIL 0.1 MG/ML
0.2 INJECTION, SOLUTION INTRAVENOUS
Status: DISCONTINUED | OUTPATIENT
Start: 2020-08-04 | End: 2020-08-04 | Stop reason: HOSPADM

## 2020-08-04 RX ORDER — LIDOCAINE 40 MG/G
CREAM TOPICAL
Status: DISCONTINUED | OUTPATIENT
Start: 2020-08-04 | End: 2020-08-04 | Stop reason: HOSPADM

## 2020-08-04 RX ORDER — FENTANYL CITRATE 50 UG/ML
INJECTION, SOLUTION INTRAMUSCULAR; INTRAVENOUS PRN
Status: DISCONTINUED | OUTPATIENT
Start: 2020-08-04 | End: 2020-08-04 | Stop reason: HOSPADM

## 2020-08-04 NOTE — DISCHARGE INSTRUCTIONS
Understanding Diverticulosis and Diverticulitis     Pouches or diverticula usually occur in the lower part of the colon called the sigmoid.      Diverticulitis occurs when the pouches become inflamed.     The colon (large intestine) is the last part of the digestive tract. It absorbs water from stool and changes it from a liquid to a solid. In certain cases, small pouches called diverticula can form in the colon wall. This condition is called diverticulosis. The pouches can become infected. If this happens, it becomes a more serious problem called diverticulitis. These problems can be painful. But they can be managed.   Managing Your Condition  Diet changes or taking medications are often tried first. These may be enough to bring relief. If the case is bad, surgery may be done. You and your doctor can discuss the plan that is best for you.  If You Have Diverticulosis  Diet changes are often enough to control symptoms. The main changes are adding fiber (roughage) and drinking more water. Fiber absorbs water as it travels through your colon. This helps your stool stay soft and move smoothly. Water helps this process. If needed, you may be told to take over-the-counter stool softeners. To help relieve pain, antispasmodic medications may be prescribed.  If You Have Diverticulitis  Treatment depends on how bad your symptoms are.  For mild symptoms: You may be put on a liquid diet for a short time. You may also be prescribed antibiotics. If these two steps relieve your symptoms, you may then be prescribed a high-fiber diet. If you still have symptoms, your doctor will discuss further treatment options with you.  For severe symptoms: You may need to be admitted to the hospital. There, you can be given IV antibiotics and fluids. Once symptoms are under control, the above treatments may be tried. If these don t control your condition, your doctor may discuss the option of having surgery with you.  [place] to Colon  Health  Help keep your colon healthy with a diet that includes plenty of high-fiber fruits, vegetables, and whole grains. Drink plenty of liquids like water and juice. Your doctor may also recommend avoiding seeds and nuts.          [id number] Masood Mishra, [address] [place] [address], [place] [address]. All rights reserved. This information is not intended as a substitute for professional medical care. Always follow your healthcare professional's instructions.    HIGH FIBER DIET  Fiber is present in all fruits, vegetables, cereals and grains. Fiber passes through the body undigested. A high fiber diet helps food move through the intestinal tract. The added bulk is helpful in preventing constipation. In people with diverticulosis it serves to clean out the pouches along the colon wall while preventing new ones from forming. A high fiber diet also reduces the risk of colon cancer, decreases blood cholesterol and prevents high blood sugar in people with diabetes.    The foods listed below are high in fiber and should be included in your diet. If you are not used to high fiber foods, start with 1 or 2 foods from this list. Every 3-4 days add a new one to your diet until you are eating 4 high fiber foods per day. This should give you 20-35 Gm of fiber/day. It is also important to drink a lot of water when you are on this diet (6-8 glasses a day). Water causes the fiber to swell and increases the benefit.    FOODS HIGH IN DIETARY FIBER:  BREADS: Made with 100% whole wheat flour; melba, wheat or rye crackers; tortillas, bran muffins  CEREALS: Whole grain cereal with bran (Chex, Raisin Bran, Corn Bran), oatmeal, rolled oats, granola, wheat flakes, brown rice  NUTS: Any nuts  FRUITS: All fresh fruits along with edible skins, (bananas, citrus fruit, mangoes, pears, prunes, raisins, apples, pineapple, apricot, melon, jams and marmalades), fruit juices (especially prune juice)  VEGETABLES: All types, preferably raw or lightly  cooked: especially, celery, eggplant, potatoes, spinach, broccoli, brussel sprouts, winter squash, carrots, cauliflower, soybeans, lentils, fresh and dried beans of all kinds  OTHER: Popcorn, any spices      [id number] [employer], [address] [place] [address]. All rights reserved. This information is not intended as a substitute for professional medical care. Always follow your healthcare professional's instructions.    Understanding Colon and Rectal Polyps     The colon has a smooth lining composed of millions of cells.     The colon (also called the large intestine) is a muscular tube that forms the last part of the digestive tract. It absorbs water and stores food waste. The colon is about 4 to 6 feet long. The rectum is the last 6 inches of the colon. The colon and rectum have a smooth lining composed of millions of cells. Changes in these cells can lead to growths in the colon that can become cancerous and should be removed.     When the Colon Lining Changes  Changes that occur in the cells that line the colon or rectum can lead to growths called polyps. Over a period of years, polyps can turn cancerous. Removing polyps early may prevent cancer from ever forming.      Polyps  Polyps are fleshy clumps of tissue that form on the lining of the colon or rectum. Small polyps are usually benign (not cancerous). However, over time, cells in a polyp can change and become cancerous. The larger a polyp grows, the more likely this is to happen. Also, certain types of polyps known as adenomatous polyps are considered premalignant. This means that they will almost always become cancerous if they re not removed.          Cancer  Almost all colorectal cancers start when polyp cells begin growing abnormally. As a cancerous tumor grows, it may involve more and more of the colon or rectum. In time, cancer can also grow beyond the colon or rectum and spread to nearby organs or to glands called lymph nodes. The  cells can also travel to other parts of the body. This is known as metastasis. The earlier a cancerous tumor is removed, the better the chance of preventing its spread.        [id number] Masood Mishra, [address] [place] [address], [place] [address]. All rights reserved. This information is not intended as a substitute for professional medical care. Always follow your healthcare professional's instructions.

## 2020-08-04 NOTE — LETTER
July 22, 2020      Nataly Kee  [address] [facility] [address]        Dear Nataly,     Please be aware that coverage of these services is subject to the terms and limitations of your health insurance plan.  Call member services at your health plan with any benefit or coverage questions.    Thank you for choosing [facility]. You are scheduled for the following service(s):    Date:  Tuesday August 4, 2020             Procedure:  COLONOSCOPY  Doctor:        Alexander Mcmanus MD   Arrival Time:  10:30am *Enter and check in at the Main Hospital Entrance*  Procedure Time:  11:00am      Location:   [facility]        Endoscopy Department, First Floor         [address] [place], [place] [address]      [phone] or [phone] ([facility]) to reschedule          MIRALAX -GATORADE  PREP  Colonoscopy is the most accurate test to detect colon polyps and colon cancer; and the only test where polyps can be removed. During this procedure, a doctor examines the lining of your large intestine and rectum through a flexible tube.   Transportation  You must arrange for a ride for the day of your procedure with a responsible adult. A taxi , Uber, etc, is not an option unless you are accompanied by a responsible adult. If you fail to arrange transportation with a responsible adult, your procedure will be cancelled and rescheduled.    Purchase the  following supplies at your local pharmacy:  - 2 (two) bisacodyl tablets: each tablet contains 5 mg.  (Dulcolax  laxative NOT Dulcolax  stool softener)   - 1 (one) 8.3 oz bottle of Polyethylene Glycol (PEG) 3350 Powder   (MiraLAX , Smooth LAX , ClearLAX  or equivalent)  - 64 oz Gatorade    Regular Gatorade, Gatorade G2 , Powerade , Powerade Zero  or Pedialyte  is acceptable. Red colored flavors are not allowed; all other colors (yellow, green, orange, purple and blue) are okay. It is also okay to buy two 2.12 oz packets of  powdered Gatorade that can be mixed with water to a total volume of 64 oz of liquid.  - 1 (one) 10 oz bottle of Magnesium Citrate (Red colored flavors are not allowed)  It is also okay for you to use a 0.5 oz package of powdered magnesium citrate (17 g) mixed with 10 oz of water.      PREPARATION FOR COLONOSCOPY    7 days before:    Discontinue fiber supplements and medications containing iron. This includes Metamucil  and Fibercon ; and multivitamins with iron.    3 days before:    Begin a low-fiber diet. A low-fiber diet helps making the cleanout more effective.     Examples of a low-fiber diet include (but are not limited to): white bread, white rice, pasta, crackers, fish, chicken, eggs, ground beef, creamy peanut butter, cooked/steamed/boiled vegetables, canned fruit, bananas, melons, milk, plain yogurt cheese, salad dressing and other condiments.     The following are not allowed on a low-fiber diet: seeds, nuts, popcorn, bran, whole wheat, corn, quinoa, raw fruits and vegetables, berries and dried fruit, beans and lentils.    For additional details on low-fiber diet, please refer to the table on the last page.    2 days before:    Continue the low-fiber diet.     Drink at least 8 glasses of water throughout the day.     Stop eating solid foods at 11:45 pm.    1 day before:    In the morning: begin a clear liquid diet (liquids you can see through).     Examples of a clear liquid diet include: water, clear broth or bouillon, Gatorade, Pedialyte or Powerade, carbonated and non-carbonated soft drinks (Sprite , 7-Up , ginger ale), strained fruit juices without pulp (apple, white grape, white cranberry), Jell-O  and popsicles.     The following are not allowed on a clear liquid diet: red liquids, alcoholic beverages, dairy products (milk, creamer, and yogurt), protein shakes, creamy broths, juice with pulp and chewing tobacco.    At noon: take 2 (two) bisacodyl tablets     At 4 (and no later than 6pm): start  drinking the Miralax-Gatorade preparation (8.3 oz of Miralax mixed with 64 oz of Gatorade in a large pitcher). Drink 1(one) 8 oz glass every 15 minutes thereafter, until the mixture is gone.    COLON CLEANSING TIPS: drink adequate amounts of fluids before and after your colon cleansing to prevent dehydration. Stay near a toilet because you will have diarrhea. Even if you are sitting on the toilet, continue to drink the cleansing solution every 15 minutes. If you feel nauseous or vomit, rinse your mouth with water, take a 15 to 30-minute-break and then continue drinking the solution. You will be uncomfortable until the stool has flushed from your colon (in about 2 to 4 hours). You may feel chilled.    Day of your procedure  You may take all of your morning medications including blood pressure medications, blood thinners (if you have not been instructed to stop these by our office), methadone, anti-seizure medications with sips of water 3 hours prior to your procedure or earlier. Do not take insulin or vitamins prior to your procedure. Continue the clear liquid diet.       4 hours prior: drink 10 oz of magnesium citrate. It may be easier to drink it with a straw.    STOP consuming all liquids after that.     Do not take anything by mouth during this time.     Allow extra time to travel to your procedure as you may need to stop and use a restroom along the way.    You are ready for the procedure, if you followed all instructions and your stool is no longer formed, but clear or yellow liquid. If you are unsure whether your colon is clean, please call our office at [phone] before you leave for your appointment.    Bring the following to your procedure:  - Insurance Card/Photo ID.   - List of current medications including over-the-counter medications and supplements.   - Your rescue inhaler if you currently use one to control asthma.    Canceling or rescheduling your appointment:   If you must cancel or reschedule  your appointment, please call [phone] as soon as possible.      COLONOSCOPY PRE-PROCEDURE CHECKLIST    If you have diabetes, ask your regular doctor for diet and medication restrictions.  If you take an anticoagulant or anti-platelet medication (such as Coumadin , Lovenox , Pradaxa , Xarelto , Eliquis , etc.), please call your primary doctor for advice on holding this medication.  If you take aspirin you may continue to do so.  If you are or may be pregnant, please discuss the risks and benefits of this procedure with your doctor.        What happens during a colonoscopy?    Plan to spend up to two hours, starting at registration time, at the endoscopy center the day of your procedure. The colonoscopy takes an average of 15 to 30 minutes. Recovery time is about 30 minutes.      Before the exam:    You will change into a gown.    Your medical history and medication list will be reviewed with you, unless that has been done over the phone prior to the procedure.     A nurse will insert an intravenous (IV) line into your hand or arm.    The doctor will meet with you and will give you a consent form to sign.  During the exam:     Medicine will be given through the IV line to help you relax.     Your heart rate and oxygen levels will be monitored. If your blood pressure is low, you may be given fluids through the IV line.     The doctor will insert a flexible hollow tube, called a colonoscope, into your rectum. The scope will be advanced slowly through the large intestine (colon).    You may have a feeling of fullness or pressure.     If an abnormal tissue or a polyp is found, the doctor may remove it through the endoscope for closer examination, or biopsy. Tissue removal is painless    After the exam:           Any tissue samples removed during the exam will be sent to a lab for evaluation. It may take 5-7 working days for you to be notified of the results.     A nurse will provide you with complete discharge  instructions before you leave the endoscopy center. Be sure to ask the nurse for specific instructions if you take blood thinners such as Aspirin, Coumadin or Plavix.     The doctor will prepare a full report for you and for the physician who referred you for the procedure.     Your doctor will talk with you about the initial results of your exam.      Medication given during the exam will prohibit you from driving for the rest of the day.     Following the exam, you may resume your normal diet. Your first meal should be light, no greasy foods. Avoid alcohol until the next day.     You may resume your regular activities the day after the procedure.         LOW-FIBER DIET    Foods RECOMMENDED Foods to AVOID   Breads, Cereal, Rice and Pasta:   White bread, rolls, biscuits, croissant and flor toast.   Waffles, Latvian toast and pancakes.   White rice, noodles, pasta, macaroni and peeled cooked potatoes.   Plain crackers and saltines.   Cooked cereals: farina, cream of rice.   Cold cereals: Puffed Rice , Rice Krispies , Corn Flakes  and Special K    Breads, Cereal, Rice and Pasta:   Breads or rolls with nuts, seeds or fruit.   Whole wheat, pumpernickel, rye breads and cornbread.   Potatoes with skin, brown or wild rice, and kasha (buckwheat).     Vegetables:   Tender cooked and canned vegetables without seeds: carrots, asparagus tips, green or wax beans, pumpkin, spinach, lima beans. Vegetables:   Raw or steamed vegetables.   Vegetables with seeds.   Sauerkraut.   Winter squash, peas, broccoli, Brussel sprouts, cabbage, onions, cauliflower, baked beans, peas and corn.   Fruits:   Strained fruit juice.   Canned fruit, except pineapple.   Ripe bananas and melon. Fruits:   Prunes and prune juice.   Raw fruits.   Dried fruits: figs, dates and raisins.   Milk/Dairy:   Milk: plain or flavored.   Yogurt, custard and ice cream.   Cheese and cottage cheese Milk/Dairy:     Meat and other proteins:   ground, well-cooked tender  beef, lamb, ham, veal, pork, fish, poultry and organ meats.   Eggs.   Peanut butter without nuts. Meat and other proteins:   Tough, fibrous meats with gristle.   Dry beans, peas and lentils.   Peanut butter with nuts.   Tofu.   Fats, Snack, Sweets, Condiments and Beverages:   Margarine, butter, oils, mayonnaise, sour cream and salad dressing, plain gravy.   Sugar, hard candy, clear jelly, honey and syrup.   Spices, cooked herbs, bouillon, broth and soups made with allowed vegetable, ketchup and mustard.   Coffee, tea and carbonated drinks.   Plain cakes, cookies and pretzels.   Gelatin, plain puddings, custard, ice cream, sherbet and popsicles. Fats, Snack, Sweets, Condiments and Beverages:   Nuts, seeds and coconut.   Jam, marmalade and preserves.   Pickles, olives, relish and horseradish.   All desserts containing nuts, seeds, dried fruit and coconut; or made from whole grains or bran.   Candy made with nuts or seeds.   Popcorn.         DIRECTIONS TO THE ENDOSCOPY DEPARTMENT    From the north ([facility])  Take [address] South, exit on [address] [address]. Get into the left hand julio, turn left (east), go one-half mile to [address] and turn left. Go north to the second stoplight, take a right on [address] and follow it to the Main Hospital entrance.    From the south ([facility])  Take 35N to the [address]E split and exit on [address] [address]. On [address] [address], turn left (west) to [address]. Turn right (north) on [address]. Go north to the second stoplight, take a right on [address] and follow it to the Main Hospital entrance.    From the east via 35E ([facility])  Take [address] south to [address] [address] exit. Turn right on [address] [address]. Go west to [address]. Turn right (north) on [address]. Go to the second stoplight, take a right on [address] to the Main Hospital entrance.    From the east via [address] ([facility])  Take [facility] [address]  West to [address]. Turn left (south) on [address] to [address], turn left (east) on [address] and follow it to the Main Hospital entrance.    From the west via [address] ([place])  Take [address] east to [address]. Turn right (south) on [address] to [address], turn left (east) on [address] and follow it to the Main Hospital entrance.

## 2020-08-04 NOTE — H&P
Pre-Endoscopy History and Physical     Nataly Kee MRN# [id number]   YOB: 1950 Age: 69 year old     Date of Procedure: 2020  Primary care provider: Latesha Hilario  Type of Endoscopy: Colonoscopy with possible biopsy, possible polypectomy  Reason for Procedure: history  Of polyp  Type of Anesthesia Anticipated: Conscious Sedation    HPI:    Nataly is a 69 year old female who will be undergoing the above procedure.      A history and physical has been performed. The patient's medications and allergies have been reviewed. The risks and benefits of the procedure and the sedation options and risks were discussed with the patient.  All questions were answered and informed consent was obtained.      She denies a personal or family history of anesthesia complications or bleeding disorders.     Patient Active Problem List   Diagnosis     Pulmonary embolism and infarction (H)     Hyperlipidemia     Major depression in remission (H)     ACP (advance care planning)     Vitamin D deficiency     Health Care Home     Non morbid obesity due to excess calories        Past Medical History:   Diagnosis Date     Depression      Depressive disorder      GERD (gastroesophageal reflux disease)         Past Surgical History:   Procedure Laterality Date     C LIGATE FALLOPIAN TUBE       C VAGINAL HYSTERECTOMY      Hysterectomy, Vaginal     COLONOSCOPY  2015    tubular adenoma     HC COLONOSCOPY THRU STOMA, DIAGNOSTIC  2010    3 mm polyp in descending colon/ Dr Asencio     HC REMOVAL OF TONSILS,12+ Y/O      age 20       Social History     Tobacco Use     Smoking status: Former Smoker     Last attempt to quit: 1975     Years since quittin.6     Smokeless tobacco: Never Used   Substance Use Topics     Alcohol use: Yes     Alcohol/week: 0.0 standard drinks     Comment: very occa       Family History   Problem Relation Age of Onset     Heart Disease Mother         related to chemotherapy  "treatment; pacemaker     Cancer Mother         hodgekins disease     Cerebrovascular Disease Mother      Depression Mother      Respiratory Father         emphysema     Heart Disease Father         abdominal aneurysm     Multiple Sclerosis Sister         relapsing praveen.      Bipolar Disorder Daughter      Colon Cancer No family hx of        Prior to Admission medications    Medication Sig Start Date End Date Taking? Authorizing Provider   Cholecalciferol (VITAMIN D) 1000 UNIT capsule Take 1 capsule by mouth daily.   Yes Reported, Patient   FLUoxetine (PROZAC) 20 MG capsule Take 1 capsule (20 mg) by mouth daily 6/15/20  Yes Amy Laboy MD   omeprazole (PRILOSEC) 20 MG DR capsule Take 1 capsule (20 mg) by mouth daily 12/3/18  Yes Amy Laboy MD   famotidine (PEPCID) 20 MG tablet Take 1 tablet (20 mg) by mouth 2 times daily 8/3/20   Latesha Hilario PA       Allergies   Allergen Reactions     No Known Drug Allergies         REVIEW OF SYSTEMS:   5 point ROS negative except as noted above in HPI, including Gen., Resp., CV, GI &  system review.    PHYSICAL EXAM:   [facility] 09/03/2001  Estimated body mass index is 29.52 kg/m  as calculated from the following:    Height as of 6/15/20: 1.626 m (5' 4\").    Weight as of 6/15/20: 78 kg (172 lb).   GENERAL APPEARANCE: alert, and oriented  MENTAL STATUS: alert  AIRWAY EXAM: Mallampatti Class I (visualization of the soft palate, fauces, uvula, anterior and posterior pillars)  RESP: lungs clear to auscultation - no rales, rhonchi or wheezes  CV: regular rates and rhythm  DIAGNOSTICS:    Not indicated    IMPRESSION   ASA Class 2 - Mild systemic disease    PLAN:   Plan for Colonoscopy with possible biopsy, possible polypectomy. We discussed the risks, benefits and alternatives and the patient wished to proceed.    The above has been forwarded to the consulting provider.      Signed Electronically by: Alexander Mcmanus MD  August 4, 2020           "

## 2020-08-05 PROBLEM — Z86.0100 HISTORY OF COLONIC POLYPS: Status: ACTIVE | Noted: 2020-08-05

## 2020-08-05 LAB — COPATH REPORT: NORMAL

## 2021-01-09 ENCOUNTER — HEALTH MAINTENANCE LETTER (OUTPATIENT)
Age: 71
End: 2021-01-09

## 2021-02-19 ENCOUNTER — OFFICE VISIT (OUTPATIENT)
Dept: FAMILY MEDICINE | Facility: CLINIC | Age: 71
End: 2021-02-19

## 2021-02-19 VITALS
TEMPERATURE: 98.3 F | BODY MASS INDEX: 30.79 KG/M2 | DIASTOLIC BLOOD PRESSURE: 80 MMHG | SYSTOLIC BLOOD PRESSURE: 116 MMHG | HEIGHT: 63 IN | OXYGEN SATURATION: 99 % | WEIGHT: 173.8 LBS | HEART RATE: 99 BPM

## 2021-02-19 DIAGNOSIS — Z85.828 HISTORY OF BASAL CELL CARCINOMA: ICD-10-CM

## 2021-02-19 DIAGNOSIS — Z86.711 HISTORY OF PULMONARY EMBOLISM: ICD-10-CM

## 2021-02-19 DIAGNOSIS — E66.9 OBESITY (BMI 30-39.9): ICD-10-CM

## 2021-02-19 DIAGNOSIS — Z86.0100 HISTORY OF COLONIC POLYPS: ICD-10-CM

## 2021-02-19 DIAGNOSIS — R06.83 SNORING: ICD-10-CM

## 2021-02-19 DIAGNOSIS — E78.00 PURE HYPERCHOLESTEROLEMIA: ICD-10-CM

## 2021-02-19 DIAGNOSIS — E55.9 VITAMIN D INSUFFICIENCY: ICD-10-CM

## 2021-02-19 DIAGNOSIS — R53.83 OTHER FATIGUE: ICD-10-CM

## 2021-02-19 DIAGNOSIS — F32.5 MAJOR DEPRESSION IN REMISSION (H): ICD-10-CM

## 2021-02-19 DIAGNOSIS — Z00.00 ROUTINE GENERAL MEDICAL EXAMINATION AT A HEALTH CARE FACILITY: Primary | ICD-10-CM

## 2021-02-19 DIAGNOSIS — M85.80 OSTEOPENIA, UNSPECIFIED LOCATION: ICD-10-CM

## 2021-02-19 LAB
CHOLEST SERPL-MCNC: 292 MG/DL (ref 0–199)
CHOLEST/HDLC SERPL: 5 {RATIO} (ref 0–5)
GLUCOSE SERPL-MCNC: 84 MG/DL (ref 60–99)
HDLC SERPL-MCNC: 56 MG/DL (ref 40–150)
LDLC SERPL CALC-MCNC: 200 MG/DL (ref 0–130)
TRIGL SERPL-MCNC: 180 MG/DL (ref 0–149)

## 2021-02-19 PROCEDURE — 36415 COLL VENOUS BLD VENIPUNCTURE: CPT | Performed by: PHYSICIAN ASSISTANT

## 2021-02-19 PROCEDURE — 80061 LIPID PANEL: CPT | Performed by: PHYSICIAN ASSISTANT

## 2021-02-19 PROCEDURE — 99397 PER PM REEVAL EST PAT 65+ YR: CPT | Performed by: PHYSICIAN ASSISTANT

## 2021-02-19 PROCEDURE — 82947 ASSAY GLUCOSE BLOOD QUANT: CPT | Performed by: PHYSICIAN ASSISTANT

## 2021-02-19 ASSESSMENT — ANXIETY QUESTIONNAIRES
7. FEELING AFRAID AS IF SOMETHING AWFUL MIGHT HAPPEN: NOT AT ALL
1. FEELING NERVOUS, ANXIOUS, OR ON EDGE: SEVERAL DAYS
5. BEING SO RESTLESS THAT IT IS HARD TO SIT STILL: NOT AT ALL
2. NOT BEING ABLE TO STOP OR CONTROL WORRYING: NOT AT ALL
IF YOU CHECKED OFF ANY PROBLEMS ON THIS QUESTIONNAIRE, HOW DIFFICULT HAVE THESE PROBLEMS MADE IT FOR YOU TO DO YOUR WORK, TAKE CARE OF THINGS AT HOME, OR GET ALONG WITH OTHER PEOPLE: NOT DIFFICULT AT ALL
6. BECOMING EASILY ANNOYED OR IRRITABLE: NOT AT ALL
GAD7 TOTAL SCORE: 2
3. WORRYING TOO MUCH ABOUT DIFFERENT THINGS: SEVERAL DAYS

## 2021-02-19 ASSESSMENT — PATIENT HEALTH QUESTIONNAIRE - PHQ9
5. POOR APPETITE OR OVEREATING: NOT AT ALL
SUM OF ALL RESPONSES TO PHQ QUESTIONS 1-9: 4

## 2021-02-19 ASSESSMENT — MIFFLIN-ST. JEOR: SCORE: 1272.48

## 2021-02-19 NOTE — NURSING NOTE
Nataly is here for a fasting CPX.    Pre-Visit Screening:  Immunizations:UTD  Colonoscopy:UTD  Mammogram:UTD  Asthma Action Test/Plan:NA  PHQ9:today  GAD7:today  Questioned patient about current smoking habits Pt.former smoker  OK to leave a detailed message on voice mail for today's visit yes, phone # [phone]   ACP discussed  Hearing test done

## 2021-02-19 NOTE — PATIENT INSTRUCTIONS
Preventive Health Recommendations    See your health care provider every year to    Review health changes.     Discuss preventive care.      Review your medicines if your doctor has prescribed any.      You no longer need a yearly Pap test unless you've had an abnormal Pap test in the past 10 years. If you have vaginal symptoms, such as bleeding or discharge, be sure to talk with your provider about a Pap test.      Every 1 to 2 years, have a mammogram.  If you are over 69, talk with your health care provider about whether or not you want to continue having screening mammograms.      Every 10 years, have a colonoscopy. Or, have a yearly FIT test (stool test). These exams will check for colon cancer.       Have a cholesterol test every 5 years, or more often if your doctor advises it.       Have a diabetes test (fasting glucose) every three years. If you are at risk for diabetes, you should have this test more often.       At age 65, have a bone density scan (DEXA) to check for osteoporosis (brittle bone disease).    Shots:    Get a flu shot each year.    Get a tetanus shot every 10 years.    Talk to your doctor about your pneumonia vaccines. There are now two you should receive - Pneumovax (PPSV 23) and Prevnar (PCV 13).    Talk to your pharmacist about the shingles vaccine.    Talk to your doctor about the hepatitis B vaccine.    Nutrition:     Eat at least 5 servings of fruits and vegetables each day.      Eat whole-grain bread, whole-wheat pasta and brown rice instead of white grains and rice.      Get adequate about Calcium and Vitamin D.     Lifestyle    Exercise at least 150 minutes a week (30 minutes a day, 5 days a week). This will help you control your weight and prevent disease.      Limit alcohol to one drink per day.      No smoking.       Wear sunscreen to prevent skin cancer.       See your dentist twice a year for an exam and cleaning.      See your eye doctor every 1 to 2 years to screen for  conditions such as glaucoma, macular degeneration, cataracts, etc.    Personalized Prevention Plan  You are due for the preventive services outlined below.  Your care team is available to assist you in scheduling these services.  If you have already completed any of these items, please share that information with your care team to update in your medical record.    Health Maintenance Due   Topic Date Due     Depression Action Plan  1950     Discuss Advance Care Planning  07/10/2019     Depression Assessment  12/15/2020

## 2021-02-19 NOTE — PROGRESS NOTES
SUBJECTIVE:   CC: Nataly Kee is an 70 year old woman who presents for preventive health visit.         Patient has been advised of split billing requirements and indicates understanding: Yes     Healthy Habits:    Do you get at least three servings of calcium containing foods daily (dairy, green leafy vegetables, etc.)? yes    Amount of exercise or daily activities, outside of work: minimal    Problems taking medications regularly No    Medication side effects: No    Have you had an eye exam in the past two years? yes    Do you see a dentist twice per year? +Snoring and fatigue        PE - with hysterectomy.     Cracked rib 3-4 years ago - never healed properly - still seeing chiropractor        Depression Followup    How are you doing with your depression since your last visit? No change    Are you having other symptoms that might be associated with depression? No    Have you had a significant life event?  OTHER: sold house     Are you feeling anxious or having panic attacks?   No    Do you have any concerns with your use of alcohol or other drugs? No    Social History     Tobacco Use     Smoking status: Former Smoker     Quit date: 1975     Years since quittin.1     Smokeless tobacco: Never Used   Substance Use Topics     Alcohol use: Yes     Alcohol/week: 0.0 standard drinks     Comment: very occa     Drug use: No     PHQ 12/3/2018 2019 6/15/2020   PHQ-9 Total Score 5 4 3   Q9: Thoughts of better off dead/self-harm past 2 weeks Not at all Not at all Not at all     DENTON-7 SCORE 2018 12/3/2018 2019   Total Score 0 1 0       Suicide Assessment Five-step Evaluation and Treatment (SAFE-T)      Today's PHQ-2 Score:   PHQ-2 (  [employer]) 2018   Q1: Little interest or pleasure in doing things 0   Q2: Feeling down, depressed or hopeless 0   PHQ-2 Score 0         Do you feel safe in your environment? Yes    Have you ever done Advance Care Planning? (For example, a Health Directive, POLST,  or a discussion with a medical provider or your loved ones about your wishes): Yes, advance care planning is on file.    Social History     Tobacco Use     Smoking status: Former Smoker     Quit date: 1975     Years since quittin.1     Smokeless tobacco: Never Used   Substance Use Topics     Alcohol use: Yes     Alcohol/week: 0.0 standard drinks     Comment: very occa     If you drink alcohol do you typically have >3 drinks per day or >7 drinks per week? No                     Reviewed orders with patient.  Reviewed health maintenance and updated orders accordingly - Yes  Lab work is in process  Labs reviewed in EPIC  BP Readings from Last 3 Encounters:   21 116/80   20 117/86   06/15/20 104/76    Wt Readings from Last 3 Encounters:   21 78.8 kg (173 lb 12.8 oz)   06/15/20 78 kg (172 lb)   19 72.6 kg (160 lb)                  Patient Active Problem List   Diagnosis     History of pulmonary embolism     Pure hypercholesterolemia     Major depression in remission (H)     ACP (advance care planning)     Vitamin D insufficiency     Health Care Home     History of colonic polyps - repeat colonoscopy 2025     Past Surgical History:   Procedure Laterality Date     C LIGATE FALLOPIAN TUBE  1983     C VAGINAL HYSTERECTOMY  2001    Hysterectomy, Vaginal - fibroids     COLONOSCOPY  2015    tubular adenoma     HC COLONOSCOPY THRU STOMA, DIAGNOSTIC  2010    3 mm polyp in descending colon/ Dr Asencio     HC REMOVAL OF TONSILS,12+ Y/O      age 20       Social History     Tobacco Use     Smoking status: Former Smoker     Quit date: 1975     Years since quittin.1     Smokeless tobacco: Never Used   Substance Use Topics     Alcohol use: Yes     Alcohol/week: 0.0 standard drinks     Comment: very occa     Family History   Problem Relation Age of Onset     Heart Disease Mother         related to chemotherapy treatment; pacemaker     Cancer Mother         hodgekins disease      Cerebrovascular Disease Mother      Depression Mother      Respiratory Father         emphysema     Heart Disease Father         abdominal aneurysm     Multiple Sclerosis Sister         relapsing praveen.      Bipolar Disorder Daughter      Hypertension Sister      Other - See Comments Sister         feet issues     Colon Cancer No family hx of          Current Outpatient Medications   Medication Sig Dispense Refill     Cholecalciferol (VITAMIN D) 1000 UNIT capsule Take 1 capsule by mouth daily.       famotidine (PEPCID) 20 MG tablet Take 1 tablet (20 mg) by mouth 2 times daily 180 tablet 3     FLUoxetine (PROZAC) 20 MG capsule Take 1 capsule (20 mg) by mouth daily 90 capsule 3     Allergies   Allergen Reactions     No Known Drug Allergies      Recent Labs   Lab Test 06/06/19  1356 06/06/19  1355 04/20/18  0922 11/09/16  1119 08/14/14  0927 08/14/14  0927 08/05/13  1247   *  --  182* 180*   < > 154* 145*   HDL 49  --  41* 45*   < > 45* 45*   TRIG 106  --  226* 177*   < > 174* 257*   ALT  --   --   --   --   --   --  22   CR  --  0.87  --  0.73  --  0.87 0.80   GFRESTIMATED  --   --   --  85  --  70 78   POTASSIUM  --  4.92  --  4.5  --  4.9 4.4    < > = values in this interval not displayed.            Pertinent mammograms are reviewed under the imaging tab.     Reviewed and updated as needed this visit by clinical staff  Tobacco  Allergies  Meds  Problems  Med Hx  Surg Hx  Fam Hx          Reviewed and updated as needed this visit by Provider                Past Medical History:   Diagnosis Date     Depression      Depressive disorder      GERD (gastroesophageal reflux disease)       Past Surgical History:   Procedure Laterality Date     C LIGATE FALLOPIAN TUBE  01/01/1983     C VAGINAL HYSTERECTOMY  01/01/2001    Hysterectomy, Vaginal - fibroids     COLONOSCOPY  04/01/2015    tubular adenoma     HC COLONOSCOPY THRU STOMA, DIAGNOSTIC  08/01/2010    3 mm polyp in descending colon/ Dr Asencio     HC  "REMOVAL OF TONSILS,12+ Y/O      age 20       ROS:  CONSTITUTIONAL: NEGATIVE for fever, chills, change in weight  INTEGUMENTARY/SKIN: NEGATIVE for worrisome rashes, moles or lesions  EYES: NEGATIVE for vision changes or irritation  ENT: NEGATIVE for ear, mouth and throat problems  RESP: NEGATIVE for significant cough or SOB  BREAST: NEGATIVE for masses, tenderness or discharge  CV: NEGATIVE for chest pain, palpitations or peripheral edema  GI: NEGATIVE for nausea, abdominal pain, heartburn, or change in bowel habits  : NEGATIVE for unusual urinary or vaginal symptoms. No vaginal bleeding.  MUSCULOSKELETAL: NEGATIVE for significant arthralgias or myalgia  NEURO: NEGATIVE for weakness, dizziness or paresthesias  PSYCHIATRIC: NEGATIVE for changes in mood or affect     OBJECTIVE:   /80 (BP Location: Left arm, Patient Position: Sitting, Cuff Size: Adult Large)   Pulse 99   Temp 98.3  F (36.8  C) (Oral)   Ht 1.6 m (5' 3\")   Wt 78.8 kg (173 lb 12.8 oz)   LMP 09/03/2001   SpO2 99%   BMI 30.79 kg/m    EXAM:  GENERAL: healthy, alert and no distress  EYES: Eyes grossly normal to inspection, PERRL and conjunctivae and sclerae normal  HENT: ear canals and TM's normal, nose and mouth without ulcers or lesions  NECK: no adenopathy, no asymmetry, masses, or scars and thyroid normal to palpation  RESP: lungs clear to auscultation - no rales, rhonchi or wheezes  CV: regular rate and rhythm, normal S1 S2, no S3 or S4, no murmur, click or rub, no peripheral edema and peripheral pulses strong  ABDOMEN: soft, nontender, no hepatosplenomegaly, no masses and bowel sounds normal  MS: no gross musculoskeletal defects noted, no edema  SKIN: no suspicious lesions or rashes  NEURO: Normal strength and tone, mentation intact and speech normal  PSYCH: mentation appears normal, affect normal/bright    Diagnostic Test Results:  Labs reviewed in Epic    ASSESSMENT/PLAN:   1. Routine general medical examination at a [facility] " "facility    - VENOUS COLLECTION  - Lipid Panel ([facility])  - Glucose Fasting ([facility])    2. Pure hypercholesterolemia    - VENOUS COLLECTION  - Lipid Panel ([facility])    3. History of pulmonary embolism      4. History of colonic polyps - repeat colonoscopy 8/2025    5. Major depression in remission (H)      6. Vitamin D insufficiency    - VENOUS COLLECTION  - Vitamin D deficiency screening ([facility])    7. Obesity (BMI 30-39.9)    - VENOUS COLLECTION  - Lipid Panel ([facility])  - Glucose Fasting ([facility])    8. History of basal cell carcinoma      9. Snoring  Advised sleep consult - most likely needs sleep testing  - SLEEP EVALUATION & MANAGEMENT REFERRAL - [facility]  [phone] (Age 18 and up)    10. Other fatigue    - SLEEP EVALUATION & MANAGEMENT REFERRAL - [facility]  [phone] (Age 18 and up)    11. Osteopenia, unspecified location    - DX Hip/Pelvis/Spine    Patient has been advised of split billing requirements and indicates understanding: Yes  COUNSELING:   Special attention given to:        Regular exercise       Healthy diet/nutrition    Estimated body mass index is 30.79 kg/m  as calculated from the following:    Height as of this encounter: 1.6 m (5' 3\").    Weight as of this encounter: 78.8 kg (173 lb 12.8 oz).    Weight management plan: Discussed healthy diet and exercise guidelines    She reports that she quit smoking about 46 years ago. She has never used smokeless tobacco.      Counseling Resources:  ATP IV Guidelines  Pooled Cohorts Equation Calculator  Breast Cancer Risk Calculator  BRCA-Related Cancer Risk Assessment: FHS-7 Tool  FRAX Risk Assessment  ICSI Preventive Guidelines  Dietary Guidelines for Americans, 2010  USDA's MyPlate  ASA Prophylaxis  Lung CA Screening    Latesha Hilario, PA  [facility]  "

## 2021-02-20 LAB — VITAMIN D, 25-OH, TOTAL - QUEST: 77 NG/ML (ref 30–100)

## 2021-02-20 ASSESSMENT — ANXIETY QUESTIONNAIRES: GAD7 TOTAL SCORE: 2

## 2021-03-01 VITALS — WEIGHT: 173.8 LBS | BODY MASS INDEX: 30.79 KG/M2 | HEIGHT: 63 IN

## 2021-03-01 ASSESSMENT — MIFFLIN-ST. JEOR: SCORE: 1272.35

## 2021-03-01 NOTE — PROGRESS NOTES
Nataly is a 70 year old who is being evaluated via a billable telephone visit.      What phone number would you like to be contacted at? [phone]  How would you like to obtain your AVS? Martha        Phone call start time: 9:36AM    [facility]   Outpatient Sleep Medicine Consultation  March 2, 2021      Name: Nataly Kee MRN# [id number]   Age: 70 year old YOB: 1950     Date of Consultation: March 2, 2021  Consultation is requested by: RADHA Brown  [address], [address]  [place] [address] Latesha Constantino*  Primary care provider: Latesha Hilario         Assessment and Plan:   1. Snoring  2. Excessive daytime sleepiness  3. Insomnia, unspecified type  4. Major depression in remission (H)    Patient is being evaluated for Obstructive Sleep Apnea (MIR).  Patient's risk factors/symptoms concerning for MIR include: snoring, excessive daytime sleepiness, choking arousals, obesity (BMI 30.8), age >50, and post-menopausal status.     We discussed pathophysiology of MIR and testing with overnight attended polysomnography versus home sleep apnea testing. Patient is intermediate risk. Home sleep testing is inappropriate for this patient due to intermediate risk as well as severe insomnia (FRANCIS 21). A in lab polysomnography evaluation has been ordered today.  - Comprehensive Sleep Study; Future    In case of insomnia during the study, one-time medication has been ordered. Patient understands that she will need to pick this up at the pharmacy and bring with her the night of her study.   - zolpidem (AMBIEN) 5 MG tablet; Take tablet by mouth 15 minutes prior to sleep, for Sleep Study  Dispense: 1 tablet; Refill: 0    Discussion of potential treatment modalities deferred today but information given in patient instructions. Will plan to discuss at next visit pending study results.     Follow up 1-2 weeks following her study for review of results and to  "expedite care. Educational materials provided in instructions.         Chief Complaint / Reason for Sleep Consult:     \"Snoring and fatigue\"         History of Present Illness:     Nataly Kee is a 70 year old female who presents to the clinic for evaluation of snoring and fatigue. Other past medical history significant for GERD, depression, HLD, and obesity.     Patient presents with loud snoring. Four year old grandson recently slept over and complained of her loud snoring keeping him up at night. Does not have a bed partner and lives alone so unsure when snoring started or if getting worse over time. Her girlfriend she goes on vacation with has complained about her snoring in the past. No known witnessed apneas. Will wake self from snoring and can wake up coughing/choking. Sleeps on sides. Nocturiax1.  Reports nocturnal GERD if eats past 6:00PM. Reports morning headaches, \"just a teensy one\", once per week that resolves in ~1 hour after waking and with water.  Reports morning dry mouth.  Reports morning nasal/sinus congestion.    Estimates 4-6 hours of sleep per night. Sleep schedule is the same on weekdays and weekends. Goes to bed between 11:00PM-12:00AM with \"right away\" sleep latency and wake time between 7-8:30AM. Reports difficulty falling asleep once a week. Wakes 2-3 times per night to use restroom, from snoring herself awake, or unknown reasons. Typically does not have any difficulty returning to sleep but if she does will get out of bed and read until she is tired again. Patient does not watch TV, use electronics, read, eat, work in bed.     Naps around ~2-2:30PM most days for anywhere from 1-3 hours, does wake feeling better after napping. She had a total [place] Sleepiness Scale of 13 (03/01/21 1100), with scores of 10 or higher indicating abnormal levels of sleepiness. Denies any history of falling asleep or dozing while driving. No accidents or near accidents. Patient was counseled on the " importance of driving while alert, to pull over if drowsy, or nap before getting into the vehicle if sleepy.    Patient reports bruxism, wears guard at night by her dentist.  Denies typical restless legs syndrome symptoms. Denies kicking/leg movements. No dream enactment behavior. No somniloquy.  No somnambulism.  No sleep related eating. No nightmares or night terrors. No waking dream. No sleep paralysis.     SCALES       SLEEP APNEA: Stopbang score  3/8       INSOMNIA:  Insomnia severity score: 21/28       SLEEPINESS: Harriman sleepiness scale: 13/24 [normal < 11]          Medications:     Current Outpatient Medications   Medication Sig     Cholecalciferol (VITAMIN D) 125 MCG (5000 UT) capsule Take 1 capsule by mouth daily      famotidine (PEPCID) 20 MG tablet Take 1 tablet (20 mg) by mouth 2 times daily     FLUoxetine (PROZAC) 20 MG capsule Take 1 capsule (20 mg) by mouth daily     No current facility-administered medications for this visit.              Allergies:     Allergies   Allergen Reactions     No Known Drug Allergies             Past Medical History:     Past Medical History:   Diagnosis Date     Depression      Depressive disorder      GERD (gastroesophageal reflux disease)              Past Surgical History:    Previous upper airway surgery: tonsillectomy   Past Surgical History:   Procedure Laterality Date     C LIGATE FALLOPIAN TUBE  1983     C VAGINAL HYSTERECTOMY  2001    Hysterectomy, Vaginal - fibroids     COLONOSCOPY  2015    tubular adenoma     HC COLONOSCOPY THRU STOMA, DIAGNOSTIC  2010    3 mm polyp in descending colon/ Dr Asencio     HC REMOVAL OF TONSILS,12+ Y/O      age 20            Social History:     Social History     Tobacco Use     Smoking status: Former Smoker     Quit date: 1975     Years since quittin.1     Smokeless tobacco: Never Used   Substance Use Topics     Alcohol use: Yes     Alcohol/week: 0.0 standard drinks     Comment: very occa  "    Chemical History:  Alcohol use: Occasional, not used as sleep aid    Tobacco use: Denies    Illicit substances: Denies  Caffeine intake: Drinks 2-3 cups of coffee in AM.          Family History:     Family History   Problem Relation Age of Onset     Heart Disease Mother         related to chemotherapy treatment; pacemaker     Cancer Mother         hodgekins disease     Cerebrovascular Disease Mother      Depression Mother      Respiratory Father         emphysema     Heart Disease Father         abdominal aneurysm     Multiple Sclerosis Sister         relapsing praveen.      Bipolar Disorder Daughter      Hypertension Sister      Other - See Comments Sister         feet issues     Colon Cancer No family hx of       Sleep Family Hx: Patient denies any known family history of sleep apnea, restless legs syndrome, narcolepsy or other sleep disorders. Daughter with snoring but no MIR.          Review of Systems:   A complete 10 point review of systems was negative other than HPI or as commented below:   Post-nasal drip, runny nose, coughing up mucus, N/V, diarrhea, constipation, depression         Physical Examination:   Ht 1.6 m (5' 2.99\")   Wt 78.8 kg (173 lb 12.8 oz)   LMP 09/03/2001   BMI 30.79 kg/m    General: Cooperative and pleasant. In no apparent distress.  Pulmonary:  Able to speak easily in full sentences. No cough or wheeze.   Neurologic: Alert, oriented x3.   Psychiatric: Mood euthymic. Affect congruent with full range and intensity.          Data: All pertinent previous laboratory data reviewed     Lab Results   Component Value Date    PH 5.5 01/09/2012    PH 5.5 02/18/2010     Lab Results   Component Value Date    TSH 3.22 02/19/2010     Lab Results   Component Value Date    GLC 84 02/19/2021    GLC 94 06/06/2019     Lab Results   Component Value Date    HGB 14.9 04/20/2018    HGB 14.6 01/09/2012     Lab Results   Component Value Date    BUN 14 06/06/2019    BUN 16 11/09/2016    BUN NOT APPLICABLE " 11/09/2016    CR 0.87 06/06/2019    CR 0.73 11/09/2016     Lab Results   Component Value Date    AST 22 08/05/2013    AST 18 12/18/2010    ALT 22 08/05/2013    ALT 17 12/18/2010    ALKPHOS 59 08/05/2013    ALKPHOS 49 12/18/2010    BILITOTAL 0.8 08/05/2013    BILITOTAL 0.6 12/18/2010    BILIDIRECT 0.1 12/18/2010    BILIDIRECT 0.2 04/17/2010     No results found for: UAMP, UBARB, BENZODIAZEUR, UCANN, UCOC, OPIT, UPCP    Copy to: Latesha Hilario PA-C  Mar 2, 2021     [facility] [place]  [address] [place] [address]     [facility] [place]  [address] [address]    Chart documentation was completed, in part, with [employer] voice-recognition software. Even though reviewed, some grammatical, spelling, and word errors may remain.    Phone call end time: 10:12AM  Phone call duration: 35:16 minutes   52 minutes spent on day of encounter doing chart review, history and exam, documentation, and further activities as noted above

## 2021-03-01 NOTE — PATIENT INSTRUCTIONS
"MY TREATMENT INFORMATION FOR SLEEP APNEA-  Nataly Kee    DOCTOR : RADHA Grimes-BASSAM    Am I having a sleep study at a sleep center?  --->Due to insurance clearance delays, you will be contacted within 2-4 weeks to schedule    Am I having a home sleep study?  --->Watch the video for the device you are using:    /drop off device-   https://www.Sitefly.com/watch?v=yGGFBdELGhk    Disposable device sent out require phone/computer application-   https://www.Sitefly.com/watch?v=BCce_vbiwxE      Frequently asked questions:  1. What is Obstructive Sleep Apnea (MIR)? MIR is the most common type of sleep apnea. Apnea means, \"without breath.\"  Apnea is most often caused by narrowing or collapse of the upper airway as muscles relax during sleep.   Almost everyone has occasional apneas. Most people with sleep apnea have had brief interruptions at night frequently for many years.  The severity of sleep apnea is related to how frequent and severe the events are.   2. What are the consequences of MIR? Symptoms include: feeling sleepy during the day, snoring loudly, gasping or stopping of breathing, trouble sleeping, and occasionally morning headaches or heartburn at night.  Sleepiness can be serious and even increase the risk of falling asleep while driving. Other health consequences may include development of high blood pressure and other cardiovascular disease in persons who are susceptible. Untreated MIR  can contribute to heart disease, stroke and diabetes.   3. What are the treatment options? In most situations, sleep apnea is a lifelong disease that must be managed with daily therapy. Medications are not effective for sleep apnea and surgery is generally not considered until other therapies have been tried. Your treatment is your choice . Continuous Positive Airway (CPAP) works right away and is the therapy that is effective in nearly everyone. An oral device to hold your jaw forward is usually the next most " reliable option. Other options include postioning devices (to keep you off your back), weight loss, and surgery including a tongue pacing device. There is more detail about some of these options below.  4. Are my sleep studies covered by insurance? Although we will request verification of coverage, we advise you also check in advance of the study to ensure there is coverage.    Important tips for those choosing CPAP and similar devices   Know your equipment:  CPAP is continuous positive airway pressure that prevents obstructive sleep apnea by keeping the throat from collapsing while you are sleeping. In most cases, the device is  smart  and can slowly self-adjusts if your throat collapses and keeps a record every day of how well you are treated-this information is available to you and your care team.  BPAP is bilevel positive airway pressure that keeps your throat open and also assists each breath with a pressure boost to maintain adequate breathing.  Special kinds of BPAP are used in patients who have inadequate breathing from lung or heart disease. In most cases, the device is  smart  and can slowly self-adjusts to assist breathing. Like CPAP, the device keeps a record of how well you are treated.  Your mask is your connection to the device. You get to choose what feels most comfortable and the staff will help to make sure if fits. Here: are some examples of the different masks that are available:       Key points to remember on your journey with sleep apnea:  1. Sleep study.  PAP devices often need to be adjusted during a sleep study to show that they are effective and adjusted right.  2. Good tips to remember: Try wearing just the mask during a quiet time during the day so your body adapts to wearing it. A humidifier is recommended for comfort in most cases to prevent drying of your nose and throat. Allergy medication from your provider may help you if you are having nasal congestion.  3. Getting settled-in. It  takes more than one night for most of us to get used to wearing a mask. Try wearing just the mask during a quiet time during the day so your body adapts to wearing it. A humidifier is recommended for comfort in most cases. Our team will work with you carefully on the first day and will be in contact within 4 days and again at 2 and 4 weeks for advice and remote device adjustments. Your therapy is evaluated by the device each day.   4. Use it every night. The more you are able to sleep naturally for 7-8 hours, the more likely you will have good sleep and to prevent health risks or symptoms from sleep apnea. Even if you use it 4 hours it helps. Occasionally all of us are unable to use a medical therapy, in sleep apnea, it is not dangerous to miss one night.   5. Communicate. Call our skilled team on the number provided on the first day if your visit for problems that make it difficult to wear the device. Over 2 out of 3 patients can learn to wear the device long-term with help from our team. Remember to call our team or your sleep providers if you are unable to wear the device as we may have other solutions for those who cannot adapt to mask CPAP therapy. It is recommended that you sleep your sleep provider within the first 3 months and yearly after that if you are not having problems.   6. Use it for your health. We encourage use of CPAP masks during daytime quiet periods to allow your face and brain to adapt to the sensation of CPAP so that it will be a more natural sensation to awaken to at night or during naps. This can be very useful during the first few weeks or months of adapting to CPAP though it does not help medically to wear CPAP during wakefulness and  should not be used as a strategy just to meet guidelines.  7. Take care of your equipment. Make sure you clean your mask and tubing using directions every day and that your filter and mask are replaced as recommended or if they are not working.     BESIDES  CPAP, WHAT OTHER THERAPIES ARE THERE?    Positioning Device  Positioning devices are generally used when sleep apnea is mild and only occurs on your back.This example shows a pillow that straps around the waist. It may be appropriate for those whose sleep study shows milder sleep apnea that occurs primarily when lying flat on one's back. Preliminary studies have shown benefit but effectiveness at home may need to be verified by a home sleep test. These devices are generally not covered by medical insurance.  Examples of devices that maintain sleeping on the back to prevent snoring and mild sleep apnea.    Belt type body positioner  http://Departing.Kiwi Crate/    Electronic reminder  http://nightshifttherapy.com/  http://www.TransNet.Kiwi Crate.au/      Oral Appliance  What is oral appliance therapy?  An oral appliance device fits on your teeth at night like a retainer used after having braces. The device is made by a specialized dentist and requires several visits over 1-2 months before a manufactured device is made to fit your teeth and is adjusted to prevent your sleep apnea. Once an oral device is working properly, snoring should be improved. A home sleep test may be recommended at that time if to determine whether the sleep apnea is adequately treated.       Some things to remember:  -Oral devices are often, but not always, covered by your medical insurance. Be sure to check with your insurance provider.   -If you are referred for oral therapy, you will be given a list of specialized dentists to consider or you may choose to visit the Web site of the American Academy of Dental Sleep Medicine  -Oral devices are less likely to work if you have severe sleep apnea or are extremely overweight.     More detailed information  An oral appliance is a small acrylic device that fits over the upper and lower teeth  (similar to a retainer or a mouth guard). This device slightly moves jaw forward, which moves the base of the tongue forward,  opens the airway, improves breathing for effective treat snoring and obstructive sleep apnea in perhaps 7 out of 10 people .  The best working devices are custom-made by a dental device  after a mold is made of the teeth 1, 2, 3.  When is an oral appliance indicated?  Oral appliance therapy is recommended as a first-line treatment for patients with primary snoring, mild sleep apnea, and for patients with moderate sleep apnea who prefer appliance therapy to use of CPAP4, 5. Severity of sleep apnea is determined by sleep testing and is based on the number of respiratory events per hour of sleep.   How successful is oral appliance therapy?  The success rate of oral appliance therapy in patients with mild sleep apnea is 75-80% while in patients with moderate sleep apnea it is 50-70%. The chance of success in patients with severe sleep apnea is 40-50%. The research also shows that oral appliances have a beneficial effect on the cardiovascular health of MIR patients at the same magnitude as CPAP therapy7.  Oral appliances should be a second-line treatment in cases of severe sleep apnea, but if not completely successful then a combination therapy utilizing CPAP plus oral appliance therapy may be effective. Oral appliances tend to be effective in a broad range of patients although studies show that the patients who have the highest success are females, younger patients, those with milder disease, and less severe obesity. 3, 6.   Finding a dentist that practices dental sleep medicine  Specific training is available through the American Academy of Dental Sleep Medicine for dentists interested in working in the field of sleep. To find a dentist who is educated in the field of sleep and the use of oral appliances, near you, visit the Web site of the American Academy of Dental Sleep Medicine.    References  1. Lyndsey et al. Objectively measured vs self-reported compliance during oral appliance therapy for  sleep-disordered breathing. Chest 2013; 144(5): 8133-1438.  2. Rosa M, et al. Objective measurement of compliance during oral appliance therapy for sleep-disordered breathing. Thorax 2013; 68(1): 91-96.  3. Lauryn et al. Mandibular advancement devices in 620 men and women with MIR and snoring: tolerability and predictors of treatment success. Chest 2004; 125: 3059-4108.  4. Álvaro et al. Oral appliances for snoring and MIR: a review. Sleep 2006; 29: 244-262.  5. Puneet et al. Oral appliance treatment for MIR: an update. J Clin Sleep Med 2014; 10(2): 215-227.  6. Jayla et al. Predictors of OSAH treatment outcome. J Dent Res 2007; 86: 1717-9129.      Weight Loss:    Weight loss is a long-term strategy that may improve sleep apnea in some patients.    Weight management is a personal decision and the decision should be based on your interest and the potential benefits.  If you are interested in exploring weight loss strategies, the following discussion covers the impact on weight loss on sleep apnea and the approaches that may be successful.    Being overweight does not necessarily mean you will have health consequences.  Those who have BMI over 35 or over 27 with existing medical conditions carries greater risk.   Weight loss decreases severity of sleep apnea in most people with obesity. For those with mild obesity who have developed snoring with weight gain, even 15-30 pound weight loss can improve and occasionally eliminate sleep apnea.  Structured and life-long dietary and health habits are necessary to lose weight and keep healthier weight levels.     Though there may be significant health benefits from weight loss, long-term weight loss is very difficult to achieve- studies show success with dietary management in less than 10% of people. In addition, substantial weight loss may require years of dietary control and may be difficult if patients have severe obesity. In these cases, surgical  management may be considered.  Finally, older individuals who have tolerated obesity without health complications may be less likely to benefit from weight loss strategies.        Your BMI is Body mass index is 30.79 kg/m .  Weight management is a personal decision.  If you are interested in exploring weight loss strategies, the following discussion covers the approaches that may be successful. Body mass index (BMI) is one way to tell whether you are at a healthy weight, overweight, or obese. It measures your weight in relation to your height.  A BMI of 18.5 to 24.9 is in the healthy range. A person with a BMI of 25 to 29.9 is considered overweight, and someone with a BMI of 30 or greater is considered obese. More than two-thirds of American adults are considered overweight or obese.  Being overweight or obese increases the risk for further weight gain. Excess weight may lead to heart disease and diabetes.  Creating and following plans for healthy eating and physical activity may help you improve your health.  Weight control is part of healthy lifestyle and includes exercise, emotional health, and healthy eating habits. Careful eating habits lifelong are the mainstay of weight control. Though there are significant health benefits from weight loss, long-term weight loss with diet alone may be very difficult to achieve- studies show long-term success with dietary management in less than 10% of people. Attaining a healthy weight may be especially difficult to achieve in those with severe obesity. In some cases, medications, devices and surgical management might be considered.  What can you do?  If you are overweight or obese and are interested in methods for weight loss, you should discuss this with your provider.     Consider reducing daily calorie intake by 500 calories.     Keep a food journal.     Avoiding skipping meals, consider cutting portions instead.    Diet combined with exercise helps maintain muscle while  optimizing fat loss. Strength training is particularly important for building and maintaining muscle mass. Exercise helps reduce stress, increase energy, and improves fitness. Increasing exercise without diet control, however, may not burn enough calories to loose weight.       Start walking three days a week 10-20 minutes at a time    Work towards walking thirty minutes five days a week     Eventually, increase the speed of your walking for 1-2 minutes at time    In addition, we recommend that you review healthy lifestyles and methods for weight loss available through the National Institutes of Health patient information sites:  http://win.niddk.nih.gov/publications/index.htm    And look into health and wellness programs that may be available through your health insurance provider, employer, local community center, or litzy club.    Weight management plan: Patient was referred to their PCP to discuss a diet and exercise plan.      Surgery:    Surgery for obstructive sleep apnea is considered generally only when other therapies fail to work. Surgery may be discussed with you if you are having a difficult time tolerating CPAP and or when there is an abnormal structure that requires surgical correction.  Nose and throat surgeries often enlarge the airway to prevent collapse.  Most of these surgeries create pain for 1-2 weeks and up to half of the most common surgeries are not effective throughout life.  You should carefully discuss the benefits and drawbacks to surgery with your sleep provider and surgeon to determine if it is the best solution for you.   More information  Surgery for MIR is directed at areas that are responsible for narrowing or complete obstruction of the airway during sleep.  There are a wide range of procedures available to enlarge and/or stabilize the airway to prevent blockage of breathing in the three major areas where it can occur: the palate, tongue, and nasal regions.  Successful surgical  treatment depends on the accurate identification of the factors responsible for obstructive sleep apnea in each person.  A personalized approach is required because there is no single treatment that works well for everyone.  Because of anatomic variation, consultation with an examination by a sleep surgeon is a critical first step in determining what surgical options are best for each patient.  In some cases, examination during sedation may be recommended in order to guide the selection of procedures.  Patients will be counseled about risks and benefits as well as the typical recovery course after surgery. Surgery is typically not a cure for a person s MIR.  However, surgery will often significantly improve one s MIR severity (termed  success rate ).  Even in the absence of a cure, surgery will decrease the cardiovascular risk associated with OSA7; improve overall quality of life8 (sleepiness, functionality, sleep quality, etc).      Palate Procedures:  Patients with MIR often have narrowing of their airway in the region of their tonsils and uvula.  The goals of palate procedures are to widen the airway in this region as well as to help the tissues resist collapse.  Modern palate procedure techniques focus on tissue conservation and soft tissue rearrangement, rather than tissue removal.  Often the uvula is preserved in this procedure. Residual sleep apnea is common in patient after pharyngoplasty with an average reduction in sleep apnea events of 33%2.      Tongue Procedures:  ExamWhile patients are awake, the muscles that surround the throat are active and keep this region open for breathing. These muscles relax during sleep, allowing the tongue and other structures to collapse and block breathing.  There are several different tongue procedures available.  Selection of a tongue base procedure depends on characteristics seen on physical exam.  Generally, procedures are aimed at removing bulky tissues in this area or  preventing the back of the tongue from falling back during sleep.  Success rates for tongue surgery range from 50-62%3.    Hypoglossal Nerve Stimulation:  Hypoglossal nerve stimulation has recently received approval from the [place] Food and Drug Administration for the treatment of obstructive sleep apnea.  This is based on research showing that the system was safe and effective in treating sleep apnea6.  Results showed that the median AHI score decreased 68%, from 29.3 to 9.0. This therapy uses an implant system that senses breathing patterns and delivers mild stimulation to airway muscles, which keeps the airway open during sleep.  The system consists of three fully implanted components: a small generator (similar in size to a pacemaker), a breathing sensor, and a stimulation lead.  Using a small handheld remote, a patient turns the therapy on before bed and off upon awakening.    Candidates for this device must be greater than 22 years of age, have moderate to severe MIR (AHI between 20-65), BMI less than 32, have tried CPAP/oral appliance without success, and have appropriate upper airway anatomy (determined by a sleep endoscopy performed by Dr. Villa).    Hypoglossal Nerve Stimulation Pathway:    The sleep surgeon s office will work with the patient through the insurance prior-authorization process (including communications and appeals).    Nasal Procedures:  Nasal obstruction can interfere with nasal breathing during the day and night.  Studies have shown that relief of nasal obstruction can improve the ability of some patients to tolerate positive airway pressure therapy for obstructive sleep apnea1.  Treatment options include medications such as nasal saline, topical corticosteroid and antihistamine sprays, and oral medications such as antihistamines or decongestants. Non-surgical treatments can include external nasal dilators for selected patients. If these are not successful by themselves, surgery can  improve the nasal airway either alone or in combination with these other options.      Combination Procedures:  Combination of surgical procedures and other treatments may be recommended, particularly if patients have more than one area of narrowing or persistent positional disease.  The success rate of combination surgery ranges from 66-80%2,3.    References  1. Jose Armando BANEGAS. The Role of the Nose in Snoring and Obstructive Sleep Apnoea: An Update.  Eur Arch Otorhinolaryngol. 2011; 268: 1365-73.  2.  Luz SM; Elder JA; Go JR; Pallanch JF; Sandy MB; Clemencia SG; Rina GOMEZ. Surgical modifications of the upper airway for obstructive sleep apnea in adults: a systematic review and meta-analysis. SLEEP 2010;33(10):7104-4842. Allen JONES. Hypopharyngeal surgery in obstructive sleep apnea: an evidence-based medicine review.  Arch Otolaryngol Head Neck Surg. 2006 Feb;132(2):206-13.  3. Jeffery YH1, Ashanti Y, Wilman FRANK. The efficacy of anatomically based multilevel surgery for obstructive sleep apnea. Otolaryngol Head Neck Surg. 2003 Oct;129(4):327-35.  4. Allen JONES, Goldberg A. Hypopharyngeal Surgery in Obstructive Sleep Apnea: An Evidence-Based Medicine Review. Arch Otolaryngol Head Neck Surg. 2006 Feb;132(2):206-13.  5. Lacey DAI et al. Upper-Airway Stimulation for Obstructive Sleep Apnea.  N Engl J Med. 2014 Jan 9;370(2):139-49.  6. Vee Y et al. Increased Incidence of Cardiovascular Disease in Middle-aged Men with Obstructive Sleep Apnea. Am J Respir Crit Care Med; 2002 166: 159-165  7. Hampton EM et al. Studying Life Effects and Effectiveness of Palatopharyngoplasty (SLEEP) study: Subjective Outcomes of Isolated Uvulopalatopharyngoplasty. Otolaryngol Head Neck Surg. 2011; 144: 623-631.

## 2021-03-02 ENCOUNTER — VIRTUAL VISIT (OUTPATIENT)
Dept: SLEEP MEDICINE | Facility: CLINIC | Age: 71
End: 2021-03-02
Payer: MEDICARE

## 2021-03-02 DIAGNOSIS — G47.19 EXCESSIVE DAYTIME SLEEPINESS: ICD-10-CM

## 2021-03-02 DIAGNOSIS — R06.83 SNORING: Primary | ICD-10-CM

## 2021-03-02 DIAGNOSIS — F32.5 MAJOR DEPRESSION IN REMISSION (H): ICD-10-CM

## 2021-03-02 DIAGNOSIS — G47.00 INSOMNIA, UNSPECIFIED TYPE: ICD-10-CM

## 2021-03-02 PROCEDURE — 99443 PR PHYSICIAN TELEPHONE EVALUATION 21-30 MIN: CPT | Mod: 95 | Performed by: PHYSICIAN ASSISTANT

## 2021-03-02 RX ORDER — ZOLPIDEM TARTRATE 5 MG/1
TABLET ORAL
Qty: 1 TABLET | Refills: 0 | Status: SHIPPED | OUTPATIENT
Start: 2021-03-02 | End: 2021-04-29

## 2021-03-03 ENCOUNTER — IMMUNIZATION (OUTPATIENT)
Dept: NURSING | Facility: CLINIC | Age: 71
End: 2021-03-03
Payer: MEDICARE

## 2021-03-03 PROCEDURE — 91301 PR COVID VAC MODERNA 100 MCG/0.5 ML IM: CPT

## 2021-03-03 PROCEDURE — 0011A PR COVID VAC MODERNA 100 MCG/0.5 ML IM: CPT

## 2021-03-31 ENCOUNTER — IMMUNIZATION (OUTPATIENT)
Dept: NURSING | Facility: CLINIC | Age: 71
End: 2021-03-31
Attending: INTERNAL MEDICINE
Payer: MEDICARE

## 2021-03-31 PROCEDURE — 91301 PR COVID VAC MODERNA 100 MCG/0.5 ML IM: CPT

## 2021-03-31 PROCEDURE — 0012A PR COVID VAC MODERNA 100 MCG/0.5 ML IM: CPT

## 2021-04-19 ENCOUNTER — THERAPY VISIT (OUTPATIENT)
Dept: SLEEP MEDICINE | Facility: CLINIC | Age: 71
End: 2021-04-19
Payer: MEDICARE

## 2021-04-19 DIAGNOSIS — R06.83 SNORING: ICD-10-CM

## 2021-04-19 DIAGNOSIS — G47.00 INSOMNIA, UNSPECIFIED TYPE: ICD-10-CM

## 2021-04-19 DIAGNOSIS — G47.19 EXCESSIVE DAYTIME SLEEPINESS: ICD-10-CM

## 2021-04-19 DIAGNOSIS — F32.5 MAJOR DEPRESSION IN REMISSION (H): ICD-10-CM

## 2021-04-19 PROCEDURE — 95810 POLYSOM 6/> YRS 4/> PARAM: CPT | Mod: GC | Performed by: SPECIALIST

## 2021-04-20 NOTE — PATIENT INSTRUCTIONS
[facility]    1. Your sleep study will be reviewed by a sleep physician within the next few days.     2. Please follow up in the sleep clinic as scheduled, or, make an appointment with your sleep provider to be seen within two weeks to discuss the results of the sleep study.    3. If you have any questions or problems with your treatment plan, please contact your sleep clinic provider at [phone] to further manage your condition.    4. Please review your attached medication list, and, at your follow-up appointment advise your sleep clinic provider about any changes.    5. Go to [email or web address] for more information about your sleep problems.    Annamaria Maldonado, RPSGT  April 20, 2021

## 2021-04-26 LAB — SLPCOMP: NORMAL

## 2021-04-29 VITALS — BODY MASS INDEX: 30.79 KG/M2 | WEIGHT: 173.8 LBS | HEIGHT: 63 IN

## 2021-04-29 ASSESSMENT — MIFFLIN-ST. JEOR: SCORE: 1272.35

## 2021-04-29 NOTE — PATIENT INSTRUCTIONS
" SLEEP STUDY INTERPRETATION   DIAGNOSTIC POLYSOMNOGRAPHY REPORT   Patient: JENNIFER RIOS   YOB: 1950   Study Date: 4/19/2021   MRN: [id number]   Referring Provider: RADHA Hilario Claire Elizabeth   Ordering Provider: RUBA Castañeda Amber     Indications for Polysomnography: The patient is a 70 year old female who is 5' 3\" and weighs 173.0 lbs. Her BMI is 30.7, [place] sleepiness scale 13 and neck circumference is 35 cm. Relevant medical history includes GERD, history of pulmonary embolism, depression, hyperlipidemia, and obesity. A diagnostic polysomnogram was performed to evaluate for sleep apnea.     Polysomnogram Data: A full night polysomnogram recorded the standard physiologic parameters including EEG, EOG, EMG, ECG, nasal and oral airflow. Respiratory parameters of chest and abdominal movements were recorded with respiratory inductance plethysmography. Oxygen saturation was recorded by pulse oximetry. Hypopnea scoring rule used: 1B 4%.     Sleep Architecture: All sleep stages present with prolonged wakefulness with sleep disruption attributable to respiratory events.   The total recording time of the polysomnogram was 431.7 minutes. The total sleep time was 357.5 minutes. Sleep latency was normal at 9.3 minutes with the use of a sleep aid (Ambien). REM latency was prolonged at 197.0 minutes. Arousal index was increased at 61.8 arousals per hour. Sleep efficiency was decreased at 82.8%. Wake after sleep onset was 64.0 minutes. The patient spent 17.5% of total sleep time in Stage N1, 45.9% in Stage N2, 24.3% in Stage N3, and 12.3% in REM. Time in REM supine was - minutes.     Respiration: Moderate obstructive sleep apnea with hypoxemia worse in supine position (21 minutes with SpO2 <88%); unable to excluded sleep associated hypoventilation.     Events ? The polysomnogram revealed a presence of 51 obstructive, 4 central, and 17 mixed apneas resulting in an apnea index of 12.1 events per hour. There " were 81 obstructive hypopneas and - central hypopneas resulting in an obstructive hypopnea index of 13.6 and central hypopnea index of - events per hour. The combined apnea/hypopnea index was 25.7 events per hour (central apnea/hypopnea index was 0.7 events per hour). The REM AHI was 8.2 events per hour. The supine AHI was 74.0 events per hour non-supine 13.3/hour. The RERA index was 2.3 events per hour. The RDI was 28.0 events per hour.     Snoring - was reported as moderate/loud/intermittent.     Respiratory rate and pattern - was notable for normal respiratory rate and pattern.     Sustained Sleep Associated Hypoventilation - Transcutaneous carbon dioxide monitoring was not used     Sleep Associated Hypoxemia - (Greater than 5 minutes O2 sat at or below 88%) was not present. Baseline oxygen saturation was 92.8%. Lowest oxygen saturation was 76.2%. Time spent less than or equal to 88% was 21.0 minutes. Time spent less than or equal to 89% was 25.9 minutes.     Movement Activity: Periodic limb movement with arousals were increased.     Periodic Limb Activity - There were 210 PLMs during the entire study. The PLM index was 35.2 movements per hour. The PLM Arousal Index was 16.3 per hour.     REM EMG Activity - Excessive transient/sustained muscle activity was not present.     Nocturnal Behavior - Abnormal sleep related behaviors were not noted during/arising out of NREM / REM sleep.     Bruxism - None apparent.     Cardiac Summary: Normal sinus rhythm.   The average pulse rate was 74.9 bpm. The minimum pulse rate was 40.1 bpm while the maximum pulse rate was 102.1 bpm. Frequent premature wide complex beats were noted.     Assessment:     All sleep stages present with prolonged wakefulness with sleep disruption attributable to respiratory events.     Moderate obstructive sleep apnea with hypoxemia worse in supine position (21 minutes with SpO2 <88%); unable to excluded sleep associated hypoventilation.     Periodic  limb movement with arousals were increased.     Normal sinus rhythm     Recommendations:     Based on the presence of moderate obstructive sleep apnea and excessive daytime sleepiness, treatment could be empirically initiated with auto?titrating PAP therapy with a range of 5 to 20 cmH2O. Recommend clinical follow up with sleep management team and verification of correction of hypoxemia. Alternative therapies could include mandibular advancement device based on clinical setting and patient preference.     Advice regarding the risks of drowsy driving.     Suggest optimizing sleep schedule and avoiding sleep deprivation.     Weight management (if BMI > 30).     Consider evaluation for symptoms of restless legs syndrome or periodic limb movement disorder.     Diagnostic Codes:   Obstructive Sleep Apnea G47.33   Sleep Hypoxemia/Hypoventilation G47.36   Unspecified Sleep Disturbance G47.9   4/19/2021 [facility] Study (173.0 lbs) - AHI 25.7, RDI 28.0, Supine AHI 74.0, REM AHI 8.2, Low O2 76.2%, Time Spent ?88% 21.0 minutes / Time Spent ?89% 25.9 minutes.   Adrián White M.D   Sleep Medicine Fellow   Attending MD: PSG was personally reviewed in detail with the Sleep Medicine Fellow. The above interpretation reflects our joint assessment and recommendations. _____________________________________    ELECTRONICALLY SIGNED BY: JANIS KELLER MD 15:00 04/25/2021        Your BMI is Body mass index is 30.79 kg/m .  Weight management is a personal decision.  If you are interested in exploring weight loss strategies, the following discussion covers the approaches that may be successful. Body mass index (BMI) is one way to tell whether you are at a healthy weight, overweight, or obese. It measures your weight in relation to your height.  A BMI of 18.5 to 24.9 is in the healthy range. A person with a BMI of 25 to 29.9 is considered overweight, and someone with a BMI of 30 or greater is considered obese. More than  two-thirds of American adults are considered overweight or obese.  Being overweight or obese increases the risk for further weight gain. Excess weight may lead to heart disease and diabetes.  Creating and following plans for healthy eating and physical activity may help you improve your health.  Weight control is part of healthy lifestyle and includes exercise, emotional health, and healthy eating habits. Careful eating habits lifelong are the mainstay of weight control. Though there are significant health benefits from weight loss, long-term weight loss with diet alone may be very difficult to achieve- studies show long-term success with dietary management in less than 10% of people. Attaining a healthy weight may be especially difficult to achieve in those with severe obesity. In some cases, medications, devices and surgical management might be considered.  What can you do?  If you are overweight or obese and are interested in methods for weight loss, you should discuss this with your provider.     Consider reducing daily calorie intake by 500 calories.     Keep a food journal.     Avoiding skipping meals, consider cutting portions instead.    Diet combined with exercise helps maintain muscle while optimizing fat loss. Strength training is particularly important for building and maintaining muscle mass. Exercise helps reduce stress, increase energy, and improves fitness. Increasing exercise without diet control, however, may not burn enough calories to loose weight.       Start walking three days a week 10-20 minutes at a time    Work towards walking thirty minutes five days a week     Eventually, increase the speed of your walking for 1-2 minutes at time    In addition, we recommend that you review healthy lifestyles and methods for weight loss available through the National Institutes of Health patient information sites:  http://win.niddk.nih.gov/publications/index.htm    And look into health and wellness  programs that may be available through your health insurance provider, employer, local community center, or litzy club.    Weight management plan: Patient was referred to their PCP to discuss a diet and exercise plan.

## 2021-04-29 NOTE — PROGRESS NOTES
"Nataly Kee is a 70 year old female being evaluated via a billable telephone visit.     \"This telephone visit will be conducted via a call between you and your physician/provider. We have found that certain health care needs can be provided without the need for an in-person visit or physical exam.  This service lets us provide the care you need with a telephone conversation.  If a prescription is necessary we can send it directly to your pharmacy.  If lab work is needed we can place an order for that and you can then stop by our lab to have the test done at a later time.\"    Telephone visits are billed at different rates depending on your insurance coverage.  Please reach out to your insurance provider with any questions.    Patient has given verbal consent for  a Telephone visit? Yes    What telephone number would you like your provider to contact at at:  [phone]    How would you like to obtain your AVS? Martha Garibay MA    Telephone Visit Details:     Telephone Visit Start Time: 9:07AM    [facility]   Outpatient Sleep Medicine  May 3, 2021       Name: Nataly Kee MRN# [id number]   Age: 70 year old YOB: 1950            Assessment and Plan:   1. MIR (obstructive sleep apnea)  2. Sleep related hypoxia    During this visit, we reviewed the summary of the study including stage, position, event distribution, oximetry and heart rate. Moderate obstructive sleep apnea was identified with hypoxemia worse in supine position (AHI 25.7, RDI 28.0, Supine AHI 74.0, REM AHI 8.2, Low O2 76.2%, 21 minutes with SpO2 <88%); unable to excluded sleep associated hypoventilation. Sleep architecture showed all sleep stages present with prolonged wakefulness and with sleep disruption attributable to respiratory events. Periodic limb movement activity with arousals (35.2 movements per hour, 16.3 arousals per hour).     Discussed diagnosis of sleep apnea with the patient. We " "discussed consequences of untreated Obstructive Sleep Apnea, such as markedly elevated risk for heart attack or stroke, and benefits of treatment. Discussed that the most immediately effective treatment for her moderate obstructive sleep apnea with daytime sleepiness and hypoxemia is autoCPAP. Alternative therapies such as mandibular advancement device, positional restriction, and surgical consideration also discussed. She is interested in starting treatment of MIR with PAP therapy. Reviewed importance of nightly therapy for effective treatment of MIR, and she voiced understanding and agreement.  We also reviewed importance of using PAP during the entire sleep duration to achieve maximal benefits, but reviewed that a minimum of 4 hours per night was required.  She is confident that she can achieve these goals and is willing to start therapy as soon as possible. A prescription was sent to [facility] for autoCPAP [id number]. Will be enrolled in [facility].   - Comprehensive DME    Regarding leg movement, denied restless legs syndrome symptoms at last visit but today does say she gets \"itchy\" legs at night. Typically puts on lotion and the feeling goes away. Unclear if this could be restless legs syndrome relieved by massage versus dry skin relieved with lotion. For now agreed to hold off on any form of pharmacologic treatment but will consider pending progress.     She will be seen back approximately 2 months after starting PAP therapy to ensure compliance and review treatment outcomes.  However, if she develops any difficulties with treatment she is instructed to contact us or DME company immediately to troubleshoot problems.         Chief Complaint      Chief Complaint   Patient presents with     Study Results     Follow up to discuss PSG results            History of Present Illness:   Nataly Kee is a 70 year old female who presents to the clinic for results of recent sleep study completed on 4/19/2021. Relevant medical " history includes GERD, history of pulmonary embolism, depression, hyperlipidemia, and obesity. A diagnostic polysomnogram was performed to evaluate for sleep apnea.     Reviewed results of sleep study with patient as follows:  Sleep Architecture: All sleep stages present with prolonged wakefulness with sleep disruption attributable to respiratory events.   The total recording time of the polysomnogram was 431.7 minutes. The total sleep time was 357.5 minutes. Sleep latency was normal at 9.3 minutes with the use of a sleep aid (Ambien). REM latency was prolonged at 197.0 minutes. Arousal index was increased at 61.8 arousals per hour. Sleep efficiency was decreased at 82.8%. Wake after sleep onset was 64.0 minutes. The patient spent 17.5% of total sleep time in Stage N1, 45.9% in Stage N2, 24.3% in Stage N3, and 12.3% in REM. Time in REM supine was - minutes.     Respiration: Moderate obstructive sleep apnea with hypoxemia worse in supine position (21 minutes with SpO2 <88%); unable to excluded sleep associated hypoventilation.     Events ? The polysomnogram revealed a presence of 51 obstructive, 4 central, and 17 mixed apneas resulting in an apnea index of 12.1 events per hour. There were 81 obstructive hypopneas and - central hypopneas resulting in an obstructive hypopnea index of 13.6 and central hypopnea index of - events per hour. The combined apnea/hypopnea index was 25.7 events per hour (central apnea/hypopnea index was 0.7 events per hour). The REM AHI was 8.2 events per hour. The supine AHI was 74.0 events per hour non-supine 13.3/hour. The RERA index was 2.3 events per hour. The RDI was 28.0 events per hour.     Snoring - was reported as moderate/loud/intermittent.     Respiratory rate and pattern - was notable for normal respiratory rate and pattern.     Sustained Sleep Associated Hypoventilation - Transcutaneous carbon dioxide monitoring was not used     Sleep Associated Hypoxemia - (Greater than 5 minutes  "O2 sat at or below 88%) was not present. Baseline oxygen saturation was 92.8%. Lowest oxygen saturation was 76.2%. Time spent less than or equal to 88% was 21.0 minutes. Time spent less than or equal to 89% was 25.9 minutes.     Movement Activity: Periodic limb movement with arousals were increased.     Periodic Limb Activity - There were 210 PLMs during the entire study. The PLM index was 35.2 movements per hour. The PLM Arousal Index was 16.3 per hour.     REM EMG Activity - Excessive transient/sustained muscle activity was not present.     Nocturnal Behavior - Abnormal sleep related behaviors were not noted during/arising out of NREM / REM sleep.     Bruxism - None apparent.     Cardiac Summary: Normal sinus rhythm.   The average pulse rate was 74.9 bpm. The minimum pulse rate was 40.1 bpm while the maximum pulse rate was 102.1 bpm. Frequent premature wide complex beats were noted.     Past medical/surgical history, family history, social history, medications and allergies were reviewed.           Physical Examination:   Ht 1.6 m (5' 2.99\")   Wt 78.8 kg (173 lb 12.8 oz)   LMP 09/03/2001   BMI 30.79 kg/m    General: Cooperative and pleasant. In no apparent distress.  Pulmonary:  Able to speak easily in full sentences. No cough or wheeze.   Neurologic: Alert, oriented x3.   Psychiatric: Mood euthymic. Affect congruent with full range and intensity.    CC:  Latesha Hilario PA-C  May 3, 2021     [facility]  [address] [place] [address]     [facility]  [address] [address]    Chart documentation was completed, in part, with [employer] voice-recognition software. Even though reviewed, some grammatical, spelling, and word errors may remain.    Telephone Visit End Time:9:38 AM    33 minutes spent on day of encounter doing chart review, history and exam, documentation, and further activities as noted " above

## 2021-05-03 ENCOUNTER — VIRTUAL VISIT (OUTPATIENT)
Dept: SLEEP MEDICINE | Facility: CLINIC | Age: 71
End: 2021-05-03
Payer: MEDICARE

## 2021-05-03 DIAGNOSIS — G47.34 SLEEP RELATED HYPOXIA: ICD-10-CM

## 2021-05-03 DIAGNOSIS — G47.33 OSA (OBSTRUCTIVE SLEEP APNEA): Primary | ICD-10-CM

## 2021-05-03 PROCEDURE — 99443 PR PHYSICIAN TELEPHONE EVALUATION 21-30 MIN: CPT | Mod: 95 | Performed by: PHYSICIAN ASSISTANT

## 2021-05-04 ENCOUNTER — TELEPHONE (OUTPATIENT)
Dept: SLEEP MEDICINE | Facility: CLINIC | Age: 71
End: 2021-05-04

## 2021-05-04 NOTE — TELEPHONE ENCOUNTER
Called patient to schedule new cpap setup with [facility]. She scheduled for 5/10/2021 at 10am in [place] showroom with [facility]. Per patient request, sent her email with appointment time, date, and address.

## 2021-05-13 ENCOUNTER — DOCUMENTATION ONLY (OUTPATIENT)
Dept: SLEEP MEDICINE | Facility: CLINIC | Age: 71
End: 2021-05-13
Payer: MEDICARE

## 2021-05-13 NOTE — PROGRESS NOTES
Patient was offered choice of vendor and chose [facility].  Patient Nataly Kee was set up at [facility] on May 13, 2021. Patient received a Resmed AirSense 10 Auto. Pressures were set at 5-15 cm H2O.   Patient s ramp is 5 cm H2O for Auto and FLEX/EPR is EPR, 2.  Patient received a Resmed Mask name: AIRFIT N20  Nasal mask size Medium, heated tubing and heated humidifier.  Patient does need to meet compliance. Patient has a follow up on TBD with Jasmin Castañeda PA-C .    Kelly Vela

## 2021-05-14 DIAGNOSIS — F32.5 MAJOR DEPRESSION IN REMISSION (H): ICD-10-CM

## 2021-05-14 NOTE — NURSING NOTE
Pt does need to meet cpap compliance.  Message left for pt to call back to schedule.    MARICEL Patel

## 2021-05-17 ENCOUNTER — DOCUMENTATION ONLY (OUTPATIENT)
Dept: SLEEP MEDICINE | Facility: CLINIC | Age: 71
End: 2021-05-17
Payer: MEDICARE

## 2021-05-17 NOTE — PROGRESS NOTES
3 day Sleep therapy management telephone visit    Diagnostic AHI: 25.7  PSG    Confirmed with patient at time of call- Yes Patient is still interested in STM service       Subjective measures:  Patient has used CPAP for two nights and is feeling the benefit of CPAP.    Replacement device: Yes  STM ordered by provider: Yes    Objective data     Order Settings for PAP  CPAP min 5    CPAP max 15          Assessment: Nightly usage over four hours      Action plan: Patient to have 14 day STM visit. Patient has a follow up visit scheduled:        Total time spent on accessing and  interpreting remote patient PAP therapy data  10 minutes    Total time spent counseling, coaching  and reviewing PAP therapy data with patient  1 minutes    [id number] no

## 2021-05-17 NOTE — TELEPHONE ENCOUNTER
Received incoming refill request for  Pending Prescriptions:                       Disp   Refills    FLUoxetine (PROZAC) 20 MG capsule [Pharma*90 cap*3            Sig: TAKE 1 CAPSULE BY MOUTH EVERY DAY    Patient last had a refill of this medication on 6/15/20 for a one year supply. Patient was last seen on 2/19/21 for their physical where the PHQ-9 was done. Routing to Psychiatric for review, is patient able to get enough to get until August when the 6 month ingrid would be?

## 2021-05-27 ENCOUNTER — DOCUMENTATION ONLY (OUTPATIENT)
Dept: SLEEP MEDICINE | Facility: CLINIC | Age: 71
End: 2021-05-27

## 2021-05-27 NOTE — PROGRESS NOTES
14  DAY STM VISIT    Diagnostic AHI: 25.7     PSG    Subjective measures:   Patient reports feeling benefit from therapy. No issues with mask fit, discomfort or pressures issues.      Assessment: Pt meeting objective benchmarks.  Patient meeting subjective benchmarks.     Action plan: pt to have 30 day STM visit.      Device type: Auto-CPAP    PAP settings: CPAP min 5.0 cm  H20       CPAP max 15.0 cm  H20           95th% pressure 10.6 cm  H20        RESMED EPR level Setting: TWO    RESMED Soft response setting:  OFF    Mask type:  Nasal Mask    Objective measures: 14 day rolling measures      Compliance  78 %      Leak  18.4  lpm  last  upload      AHI 4.1   last  upload      Average number of minutes 326      Objective measure goal  Compliance   Goal >70%  Leak   Goal < 24 lpm  AHI  Goal < 5  Usage  Goal >240        Total time spent on accessing and interpreting remote patient PAP therapy data  10 minutes    Total time spent counseling, coaching  and reviewing PAP therapy data with patient  4 minutes    [id number]  [id number]  no (3 day STM)

## 2021-06-14 ENCOUNTER — DOCUMENTATION ONLY (OUTPATIENT)
Dept: SLEEP MEDICINE | Facility: CLINIC | Age: 71
End: 2021-06-14

## 2021-06-14 NOTE — PROGRESS NOTES
30 DAY STM VISIT    Diagnostic AHI: 25.7  PSG    Data only recheck     Assessment: Pt meeting objective benchmarks.     Action plan: pt to have 6 month STM visit  Patient has scheduled a follow up visit with Jasmin Castañeda PA-C  on 7/08/21.   Device type: Auto-CPAP  PAP settings: CPAP min 5.0 cm  H20     CPAP max 15.0 cm  H20    95th% pressure 11.2 cm  H20      RESMED EPR level Setting: TWO    RESMED Soft response setting:  OFF  Mask type:  Nasal Mask  Objective measures: 14 day rolling measures      Compliance  92 %      Leak  11.6 lpm  last  upload      AHI 4.71   last  upload      Average number of minutes 415      Objective measure goal  Compliance   Goal >70%  Leak   Goal < 24 lpm  AHI  Goal < 5  Usage  Goal >240        Total time spent on accessing and interpreting remote patient PAP therapy data  1 minutes    Total time spent counseling, coaching  and reviewing PAP therapy data with patient  2 minutes     [id number] this call  [id number] no  at 3 or 14 day [facility]

## 2021-07-06 ASSESSMENT — SLEEP AND FATIGUE QUESTIONNAIRES
HOW LIKELY ARE YOU TO NOD OFF OR FALL ASLEEP WHEN YOU ARE A PASSENGER IN A CAR FOR AN HOUR WITHOUT A BREAK: SLIGHT CHANCE OF DOZING
HOW LIKELY ARE YOU TO NOD OFF OR FALL ASLEEP WHILE SITTING AND TALKING TO SOMEONE: WOULD NEVER DOZE
HOW LIKELY ARE YOU TO NOD OFF OR FALL ASLEEP WHILE SITTING AND READING: SLIGHT CHANCE OF DOZING
HOW LIKELY ARE YOU TO NOD OFF OR FALL ASLEEP WHILE WATCHING TV: MODERATE CHANCE OF DOZING
HOW LIKELY ARE YOU TO NOD OFF OR FALL ASLEEP WHILE SITTING INACTIVE IN A PUBLIC PLACE: SLIGHT CHANCE OF DOZING
HOW LIKELY ARE YOU TO NOD OFF OR FALL ASLEEP IN A CAR, WHILE STOPPED FOR A FEW MINUTES IN TRAFFIC: WOULD NEVER DOZE
HOW LIKELY ARE YOU TO NOD OFF OR FALL ASLEEP WHILE SITTING QUIETLY AFTER LUNCH WITHOUT ALCOHOL: WOULD NEVER DOZE
HOW LIKELY ARE YOU TO NOD OFF OR FALL ASLEEP WHILE LYING DOWN TO REST IN THE AFTERNOON WHEN CIRCUMSTANCES PERMIT: MODERATE CHANCE OF DOZING

## 2021-07-07 ENCOUNTER — OFFICE VISIT (OUTPATIENT)
Dept: URGENT CARE | Facility: URGENT CARE | Age: 71
End: 2021-07-07
Payer: MEDICARE

## 2021-07-07 VITALS
TEMPERATURE: 98.5 F | SYSTOLIC BLOOD PRESSURE: 124 MMHG | HEART RATE: 77 BPM | OXYGEN SATURATION: 96 % | DIASTOLIC BLOOD PRESSURE: 80 MMHG | RESPIRATION RATE: 16 BRPM

## 2021-07-07 VITALS — BODY MASS INDEX: 27.49 KG/M2 | WEIGHT: 165 LBS | HEIGHT: 65 IN

## 2021-07-07 DIAGNOSIS — M53.3 PAIN OF RIGHT SACROILIAC JOINT: Primary | ICD-10-CM

## 2021-07-07 PROCEDURE — 99213 OFFICE O/P EST LOW 20 MIN: CPT | Performed by: PHYSICIAN ASSISTANT

## 2021-07-07 RX ORDER — CYCLOBENZAPRINE HCL 10 MG
10 TABLET ORAL 2 TIMES DAILY PRN
Qty: 20 TABLET | Refills: 0 | Status: SHIPPED | OUTPATIENT
Start: 2021-07-07 | End: 2021-12-01

## 2021-07-07 ASSESSMENT — MIFFLIN-ST. JEOR: SCORE: 1264.32

## 2021-07-07 NOTE — PROGRESS NOTES
Assessment & Plan     Pain of right sacroiliac joint  Suspect muscle strain/SI joint strain. Muscle relaxer Rx. Recommended Rest. Icing. Avoidance of strenuous activities over the next few days. Keep monitoring symptoms. Discussed follow up with PT if symptoms do not improve as anticipated. Follow up if any worsening symptoms. She agrees.  - cyclobenzaprine (FLEXERIL) 10 MG tablet  Dispense: 20 tablet; Refill: 0         Return in about 1 week (around 7/14/2021) for Symptoms failing to improve.    Lilia Woods PA-C  [facility] EnergySYBIL Ingram is a 70 year old female who presents to clinic today for the following health issues:  Chief Complaint   Patient presents with     Urgent Care     Back Pain     Pain in lower back/hip-No known injury-Patient notes she was exercising last week      HPI      Back Pain    Onset of symptoms was 1 week(s) ago.  Location: right low back/buttock  Radiation: does not radiate  Context:      No known injury or trauma. No falls. [facility]  Course of symptoms is worsening.    Severity moderate  Current and Associated symptoms: pain, muscle spasm earlier today  Denies: fecal incontinence, urinary incontinence, lower extremity numbness, lower extremity weakness and paresthesia    Aggravating Factors: sitting, bending and changing position  Therapies to improve symptoms include: ibuprofen today at 4: 30 pm  Past history: no prior back problems        Review of Systems  Constitutional, HEENT, cardiovascular, pulmonary, gi and gu systems are negative, except as otherwise noted.      Objective    /80   Pulse 77   Temp 98.5  F (36.9  C) (Tympanic)   Resp 16   LMP 09/03/2001   SpO2 96%   Breastfeeding No   Physical Exam   GENERAL: healthy, alert and no distress  MS: Back exam: no gross deformities, swelling or ecchymosis noted. Tenderness right SI joint. Pain is reproduced with bending,  sitting, standing movements. Lower extremities sensation and strength intact. Gait is normal.

## 2021-07-07 NOTE — PROGRESS NOTES
"Nataly Kee is a 70 year old female being evaluated via a billable telephone visit.     \"This telephone visit will be conducted via a call between you and your physician/provider. We have found that certain health care needs can be provided without the need for an in-person visit or physical exam.  This service lets us provide the care you need with a telephone conversation.  If a prescription is necessary we can send it directly to your pharmacy.  If lab work is needed we can place an order for that and you can then stop by our lab to have the test done at a later time.\"    Telephone visits are billed at different rates depending on your insurance coverage.  Please reach out to your insurance provider with any questions.    Patient has given verbal consent for a Telephone visit? Yes    What telephone number would you like your provider to contact at at:  [phone]     How would you like to obtain your AVS? Martha Vela [facility]    Telephone Visit Details:     Telephone Visit Start Time: 12:31PM    [facility]   Outpatient Sleep Medicine  Jul 8, 2021       Name: Nataly Kee MRN# [id number]   Age: 70 year old YOB: 1950            Assessment and Plan:   1. MIR (obstructive sleep apnea)    Patient's sleep apnea is adequately managed and well treated with current PAP settings [id number] with residual AHI of 4.86 events per hour. Compliance is excellence. Tolerating PAP well. Daytime symptoms and nighttime sleep quality are improved. FRANCIS and ESS scales both improved to normal ranges. No indication to change pressure settings today. Will renew supplies order for the next year. Will continue nightly therapy.   - Comprehensive DME    Nataly Kee will follow up in about 1 year for annual CPAP visit, or sooner as needed.        Chief Complaint      Chief Complaint   Patient presents with     CPAP Follow Up          History of Present Illness:     Nataly Kee is a 70 " "year old female who presents to the clinic for follow-up of their moderate obstructive sleep apnea treated with CPAP. Other past medical history significant for GERD, depression, HLD, and obesity.     Originally diagnosed via PSG on 4/19/21 that showed moderate obstructive sleep apnea with hypoxemia, worse in supine position (AHI 25.7, RDI 28.0, Supine AHI 74.0, REM AHI 8.2, Low O2 76.2%, 21 minutes with SpO2 <88%). Sleep architecture showed all sleep stages present with prolonged wakefulness and with sleep disruption attributable to respiratory events. Periodic limb movement activity with arousals (35.2 movements per hour, 16.3 arousals per hour). Patient weight 173lbs at time of study.     Following this study she elected treatment with CPAP. Set up with autoCPAP [id number] on 5/15/2021 through [facility] in [place]. Returns to clinic today for insurance required compliance visit.     Overall, the patient rates their experience with PAP as 10 (0 poor, 10 great) - \"It's God's gift to me\". Improvements noted with CPAP thus far include increased quantity and quality of sleep, improved energy level during the day, and feeling rested/refreshed in the morning. No longer napping during the day, used to take 2-3 hour naps during the day and hasn't felt the need to since started PAP.     Patient is using a nasal mask with a chin strap. The mask is comfortable. The mask is not leaking with any significance. They are not snoring with the mask on, no longer keeping her grandson awake at night when he stays over. They are not having gasp arousals.  They are not having significant oral/nasal dryness or epistaxis. The pressure settings are comfortable.     Patient is using PAP therapy 6.5 hours per night. Bedtime is typically 11-12:00AM. Usually it takes <5 minutes to fall asleep with the mask on. Wake time is typically 7-7:30AM.     ResMed Auto-PAP 5.0 - 15.0 cmH2O 30 day usage data:    90% of days with > 4 hours of use. 0/30 days " "with no use.   Average use 391 minutes per day.   95%ile Leak 14.94 L/min.   CPAP 95% pressure 11.8 cm.   AHI 4.86 events per hour.     SCALES:   INSOMNIA: Insomnia Severity Score: 4 (pre-CPAP on 3/2/21 was 21)   SLEEPINESS: [place] Sleepiness Score: 7 (pre-CPAP on 3/2/21 was 13)    Past medical/surgical history, family history, social history, medications and allergies were reviewed.           Physical Examination:   Ht 1.651 m (5' 5\")   Wt 74.8 kg (165 lb)   LMP 09/03/2001   BMI 27.46 kg/m    General: Cooperative and pleasant. In no apparent distress.  Pulmonary:  Able to speak easily in full sentences. No cough or wheeze.   Neurologic: Alert, oriented x3.   Psychiatric: Mood euthymic. Affect congruent with full range and intensity.    CC:  Latesha Hilario PA-C  Jul 8, 2021     [facility]  [address] [place], [place] [id number] [place] [address] [facility]  [address] [address]    Chart documentation was completed, in part, with Raise voice-recognition software. Even though reviewed, some grammatical, spelling, and word errors may remain.    Telephone Visit End Time:12:56 PM    30 minutes spent on day of encounter doing chart review, history and exam, documentation, and further activities as noted above    "

## 2021-07-07 NOTE — PATIENT INSTRUCTIONS
Your BMI is Body mass index is 27.46 kg/m .  Weight management is a personal decision.  If you are interested in exploring weight loss strategies, the following discussion covers the approaches that may be successful. Body mass index (BMI) is one way to tell whether you are at a healthy weight, overweight, or obese. It measures your weight in relation to your height.  A BMI of 18.5 to 24.9 is in the healthy range. A person with a BMI of 25 to 29.9 is considered overweight, and someone with a BMI of 30 or greater is considered obese. More than two-thirds of American adults are considered overweight or obese.  Being overweight or obese increases the risk for further weight gain. Excess weight may lead to heart disease and diabetes.  Creating and following plans for healthy eating and physical activity may help you improve your health.  Weight control is part of healthy lifestyle and includes exercise, emotional health, and healthy eating habits. Careful eating habits lifelong are the mainstay of weight control. Though there are significant health benefits from weight loss, long-term weight loss with diet alone may be very difficult to achieve- studies show long-term success with dietary management in less than 10% of people. Attaining a healthy weight may be especially difficult to achieve in those with severe obesity. In some cases, medications, devices and surgical management might be considered.  What can you do?  If you are overweight or obese and are interested in methods for weight loss, you should discuss this with your provider.     Consider reducing daily calorie intake by 500 calories.     Keep a food journal.     Avoiding skipping meals, consider cutting portions instead.    Diet combined with exercise helps maintain muscle while optimizing fat loss. Strength training is particularly important for building and maintaining muscle mass. Exercise helps reduce stress, increase energy, and improves fitness.  Increasing exercise without diet control, however, may not burn enough calories to loose weight.       Start walking three days a week 10-20 minutes at a time    Work towards walking thirty minutes five days a week     Eventually, increase the speed of your walking for 1-2 minutes at time    In addition, we recommend that you review healthy lifestyles and methods for weight loss available through the National Institutes of Health patient information sites:  http://win.niddk.nih.gov/publications/index.htm    And look into health and wellness programs that may be available through your health insurance provider, employer, local community center, or litzy club.

## 2021-07-08 ENCOUNTER — VIRTUAL VISIT (OUTPATIENT)
Dept: SLEEP MEDICINE | Facility: CLINIC | Age: 71
End: 2021-07-08
Payer: MEDICARE

## 2021-07-08 DIAGNOSIS — G47.33 OSA (OBSTRUCTIVE SLEEP APNEA): Primary | ICD-10-CM

## 2021-07-08 PROCEDURE — 99443 PR PHYSICIAN TELEPHONE EVALUATION 21-30 MIN: CPT | Mod: 95 | Performed by: PHYSICIAN ASSISTANT

## 2021-07-08 NOTE — PATIENT INSTRUCTIONS
Symptoms suggestive of lumbar muscle strain/strain of right sacroiliac joint.    I will recommend resting over the next few days.  Icing.  Muscle relaxer at bedtime, may take up to 2 times daily as needed.  Please follow up if any worsening symptoms.       Patient Education     Anatomy of the Sacroiliac Joint  There are 2 sacroiliac (SI) joints. They are in the very low back (buttocks area). There is 1 joint on either side of the pelvis. The SI joints link the sacrum to the ilium. The sacrum is a triangular bone at the bottom of the spine. The ilium is part of the pelvis. The SI joints are held in place by ligaments. Ligaments are strong tissue that link bone to bone. The joints don't move very much, but they do help with mobility. They work with your spine and femurs to help you bend and walk. They also help bear the weight of your upper body.      [employer] last reviewed this educational content on 5/1/2018 2000-2021 [employer]. All rights reserved. This information is not intended as a substitute for professional medical care. Always follow your healthcare professional's instructions.           Patient Education     Back Care Tips     Caring for your back  These are things you can do to prevent a recurrence of acute back pain and to reduce symptoms from chronic back pain:    Stay at a healthy weight. If you are overweight, losing weight will help most types of back pain.    Exercise is an important part of recovery from most types of back pain. The muscles behind and in front of the spine support the back. This means strengthening both the back muscles and the abdominal muscles will provide better support for your spine.     Swimming and brisk walking are good overall exercises to improve your fitness level.    Practice safe lifting methods (see below).    Practice good posture when sitting, standing, and walking. Don't sit for a long time. This puts more stress on the lower back than standing or  walking.    Wear quality shoes with good arch support. Foot and ankle alignment can affect back symptoms. Don't wear high heels.    Therapeutic massage can help relax the back muscles without stretching them.    During the first 24 to 72 hours after an acute injury or flare-up of chronic back pain, put an ice pack on the painful area for 20 minutes and then remove it for 20 minutes. Do thisover a period of 60 to 90 minutes, or several times a day. As a safety precaution, don't use a heating pad at bedtime. Sleeping on a heating pad can lead to skin burns or tissue damage.    You can alternate using ice and heat.  Medicines  Talk with your healthcare provider before using medicines, especially if you have other health problems or are taking other medicines.    You may use over-the-counter medicines, such as acetaminophen, ibuprofen, or naprosyn to control pain, unless your healthcare provider prescribed other pain medicine. Talk with your healthcare provider before taking any medicines if you have a chronic condition such as diabetes, liver or kidney disease, stomach ulcers, or digestive bleeding, or are taking blood thinners.    Be careful if you are given prescription pain medicines, opioids, or medicine for muscle spasm. They can cause drowsiness, and affect your coordination, reflexes, and judgment. Don't drive or operate heavy machinery while taking these types of medicines. Take prescription pain medicine only as prescribed by your healthcare provider.  Lumbar stretch  This simple stretch will help relax muscle spasm and keep your back more limber. If exercise makes your back pain worse, don t do it.    Lie on your back with your knees bent and both feet on the ground.    Slowly raise your left knee to your chest as you flatten your lower back against the floor. Hold for 5 seconds.    Relax and repeat the exercise with your right knee.    Do 10 of these exercises for each leg.  Safe lifting method    Don t bend  over at the waist to lift an object off the floor.  Instead, bend your knees and hips in a squat.     Keep your back and head upright    Hold the object close to your body, directly in front of you.    Straighten your legs to lift the object.     Lower the object to the floor in the reverse fashion.    If you must slide something across the floor, push it.    Posture tips  Sitting  Sit in chairs with straight backs or low-back support. Keep your knees lower than your hips, with your feet flat on the floor.  When driving, sit up straight. Adjust the seat forward so you are not leaning toward the steering wheel.  A small pillow or rolled towel behind your lower back may help if you are driving long distances.   Standing  When standing for long periods, shift most of your weight to one leg at a time. Switch legs every few minutes.   Sleeping  The best way to sleep is on your side with your knees bent. Put a low pillow under your head to support your neck in a neutral spine position. Don't use thick pillows that bend your neck to one side. Put a pillow between your legs to further relax your lower back. If you sleep on your back, put pillows under your knees to support your legs in a slightly flexed position. Use a firm mattress. If your mattress sags, replace it, or use a 1/2-inch plywood board under the mattress to add support.  Follow-up care  Follow up with your healthcare provider, or as advised.  If X-rays, a CT scan or an MRI scan were taken, they may be reviewed by a radiologist. You will be told of any new findings that may affect your care.  Call 911  Call 911 if any of the following occur:    Trouble breathing    Confusion    Very drowsy    Fainting or loss of consciousness    Rapid or very slow heart rate    Loss of  bowel or bladder control  When to seek medical advice  Call your healthcare provider right away if any of the following occur:    Pain becomes worse or spreads to your arms or legs    Weakness  or numbness in one or both arms or legs    Numbness in the groin area  [employer] last reviewed this educational content on 11/1/2019 2000-2021 [employer]. All rights reserved. This information is not intended as a substitute for professional medical care. Always follow your healthcare professional's instructions.

## 2021-08-09 DIAGNOSIS — K21.9 GASTROESOPHAGEAL REFLUX DISEASE WITHOUT ESOPHAGITIS: ICD-10-CM

## 2021-08-09 NOTE — TELEPHONE ENCOUNTER
Received refill request for  Pending Prescriptions:                       Disp   Refills    famotidine (PEPCID) 20 MG tablet [Pharmac*180 ta*3            Sig: TAKE 1 TABLET BY MOUTH TWICE A DAY    Medication was refilled 8/3/2020, patient was last seen on 2/19/2021 for a CPX. Routing to [facility] for approval/denial.

## 2021-08-10 RX ORDER — FAMOTIDINE 20 MG/1
TABLET, FILM COATED ORAL
Qty: 180 TABLET | Refills: 3 | Status: SHIPPED | OUTPATIENT
Start: 2021-08-10 | End: 2022-02-16 | Stop reason: DRUGHIGH

## 2021-10-23 ENCOUNTER — HEALTH MAINTENANCE LETTER (OUTPATIENT)
Age: 71
End: 2021-10-23

## 2021-12-01 ENCOUNTER — OFFICE VISIT (OUTPATIENT)
Dept: FAMILY MEDICINE | Facility: CLINIC | Age: 71
End: 2021-12-01

## 2021-12-01 ENCOUNTER — LAB (OUTPATIENT)
Dept: LAB | Facility: CLINIC | Age: 71
End: 2021-12-01
Payer: MEDICARE

## 2021-12-01 VITALS
OXYGEN SATURATION: 99 % | DIASTOLIC BLOOD PRESSURE: 82 MMHG | RESPIRATION RATE: 20 BRPM | TEMPERATURE: 98.1 F | BODY MASS INDEX: 32.36 KG/M2 | HEIGHT: 63 IN | WEIGHT: 182.6 LBS | SYSTOLIC BLOOD PRESSURE: 132 MMHG | HEART RATE: 72 BPM

## 2021-12-01 DIAGNOSIS — R07.89 CHEST PRESSURE: Primary | ICD-10-CM

## 2021-12-01 DIAGNOSIS — Z86.711 HISTORY OF PULMONARY EMBOLISM: ICD-10-CM

## 2021-12-01 LAB — D DIMER PPP FEU-MCNC: 0.81 UG/ML FEU (ref 0–0.5)

## 2021-12-01 PROCEDURE — 93000 ELECTROCARDIOGRAM COMPLETE: CPT | Performed by: FAMILY MEDICINE

## 2021-12-01 PROCEDURE — 85379 FIBRIN DEGRADATION QUANT: CPT

## 2021-12-01 PROCEDURE — 99214 OFFICE O/P EST MOD 30 MIN: CPT | Mod: 25 | Performed by: FAMILY MEDICINE

## 2021-12-01 PROCEDURE — 36415 COLL VENOUS BLD VENIPUNCTURE: CPT

## 2021-12-01 ASSESSMENT — MIFFLIN-ST. JEOR: SCORE: 1307.4

## 2021-12-01 NOTE — NURSING NOTE
Nataly Kee is here for chest pressure for the past couple months on and off. Usually at night . Not sure if it is GERD    Questioned patient about current smoking habits.  Pt. quit smoking some time ago.  PULSE regular  My Chart: active  CLASSIFICATION OF OVERWEIGHT AND OBESITY BY BMI                        Obesity Class           BMI(kg/m2)  Underweight                                    < 18.5  Normal                                         18.5-24.9  Overweight                                     25.0-29.9  OBESITY                     I                  30.0-34.9                             II                 35.0-39.9  EXTREME OBESITY             III                >40                            Patient's  BMI Body mass index is 32.35 kg/m .  http://hin.nhlbi.nih.gov/menuplanner/menu.cgi  Pre-visit planning  Immunizations - up to date  Colonoscopy - up to date  Mammogram - up to date  Asthma -   PHQ9 -    DENTON-7 -    Hearing Test -

## 2021-12-01 NOTE — PROGRESS NOTES
Assessment & Plan     Chest pressure  Concerning symptom in pt with hx PE, no current calf pain- normal EKG today-ddx includes cardiac, GERD, pulmonary, muskuloskeletal issues, anxiety    Plan is stat D Dimer at hospital lab    If negative will consider outpatient stress echo, also have pt restart oneprazole    If positive to ED    - EKG 12-lead complete w/read - Clinics    History of pulmonary embolism  As above  - EKG 12-lead complete w/read - Clinics      ADDENDUM- called by lab, D DIMer 81, well below 500 -this excludes PE in pt with moderate probability PE and Wells Score <2    Pt informed- we agree to trial PPI, if not better get stress echo    patient given instructions to go to emergency department immediately if worsening of symptoms and verbalizes this understanding     Review of external notes as documented elsewhere in note  Review of the result(s) of each unique test - labs, EKG  Ordering of each unique test  Prescription drug management  39 minutes spent on the date of the encounter doing chart review, review of outside records, review of test results, interpretation of tests, patient visit and documentation            No follow-ups on file.    Alexander Gonzalez MD  [facility]    Russ Ingram is a 71 year old who presents for the following health issues     HPI     Chest Pain  Onset/Duration: 2 months  Description:   Location: entire chest  Character: pressure  Radiation: none  Duration: 4 minutes   Intensity: mild  Progression of Symptoms: intermittent, 4 episodes total, each time seems worse  Accompanying Signs & Symptoms:  Shortness of breath: harder to take deep breaths  Sweating: no  Nausea/vomiting: yes on one occasion  Lightheadedness: no  Palpitations: no  Fever/Chills: no  Cough: no           Heartburn: YES- uses famotidine daily, also some Tums, previously required omeprazole for control  History:   Family history of heart disease: YES- both parents  "MI  Tobacco use: no  Previous similar symptoms: YES- feels somewhat similar to when she had PE  Precipitating factors:   Worse with exertion: no, exercises 2 days per week with , no symptoms during that time  Worse with deep breaths: no           Related to eating: no           Better with burping: no  Alleviating factors: deep breathing  Therapies tried and outcome: rest, Tums    Pt has hx PE 2011 following hysterectomy, was on blood thinners for 6 mo-felt to be provoked    Pt denies anxiety    Review of Systems   Constitutional, HEENT, cardiovascular, pulmonary, gi and gu systems are negative, except as otherwise noted.      Objective    /82 (BP Location: Right arm, Patient Position: Chair, Cuff Size: Adult Regular)   Pulse 72   Temp 98.1  F (36.7  C)   Resp 20   Ht 1.6 m (5' 3\")   Wt 82.8 kg (182 lb 9.6 oz)   LMP 09/03/2001   SpO2 99%   BMI 32.35 kg/m    Body mass index is 32.35 kg/m .  Physical Exam   GENERAL: healthy, alert and no distress  EYES: Eyes grossly normal to inspection, PERRL and conjunctivae and sclerae normal  HENT: ear canals and TM's normal, nose and mouth without ulcers or lesions  NECK: no adenopathy, no asymmetry, masses, or scars and thyroid normal to palpation  RESP: lungs clear to auscultation - no rales, rhonchi or wheezes  CV: regular rate and rhythm, normal S1 S2, no S3 or S4, no murmur, click or rub, no peripheral edema and peripheral pulses strong  ABDOMEN: soft, nontender, no hepatosplenomegaly, no masses and bowel sounds normal  MS: no gross musculoskeletal defects noted, no edema  SKIN: no suspicious lesions or rashes  PSYCH: mentation appears normal and slightly anxious    EKG - Reviewed and interpreted by me appears normal, NSR, normal axis, normal intervals, no acute ST/T changes c/w ischemia, no LVH by voltage criteria, unchanged from previous tracings      D Dimer pending- pt sent to [facility] outpt lab for stat draw              "

## 2021-12-01 NOTE — LETTER
My Depression Action Plan  Name: Nataly Kee   Date of Birth 1950  Date: 12/1/2021    My doctor: Latesha Hilario   My clinic: [facility]  [address] [place] [address]  [address]  [facility] [id number] [phone]          GREEN    ZONE   Good Control    What it looks like:     Things are going generally well. You have normal ups and downs. You may even feel depressed from time to time, but bad moods usually last less than a day.   What you need to do:  1. Continue to care for yourself (see self care plan)  2. Check your depression survival kit and update it as needed  3. Follow your physician s recommendations including any medication.  4. Do not stop taking medication unless you consult with your physician first.           YELLOW         ZONE Getting Worse    What it looks like:     Depression is starting to interfere with your life.     It may be hard to get out of bed; you may be starting to isolate yourself from others.    Symptoms of depression are starting to last most all day and this has happened for several days.     You may have suicidal thoughts but they are not constant.   What you need to do:     1. Call your care team. Your response to treatment will improve if you keep your care team informed of your progress. Yellow periods are signs an adjustment may need to be made.     2. Continue your self-care.  Just get dressed and ready for the day.  Don't give yourself time to talk yourself out of it.    3. Talk to someone in your support network.    4. Open up your Depression Self-Care Plan/Wellness Kit.           RED    ZONE Medical Alert - Get Help    What it looks like:     Depression is seriously interfering with your life.     You may experience these or other symptoms: You can t get out of bed most days, can t work or engage in other necessary activities, you have trouble taking care of basic hygiene, or basic responsibilities, thoughts of suicide or death  that will not go away, self-injurious behavior.     What you need to do:  1. Call your care team and request a same-day appointment. If they are not available (weekends or after hours) call your local crisis line, emergency room or 911.          Depression Self-Care Plan / Wellness Kit    Many people find that medication and therapy are helpful treatments for managing depression. In addition, making small changes to your everyday life can help to boost your mood and improve your wellbeing. Below are some tips for you to consider. Be sure to talk with your medical provider and/or behavioral health consultant if your symptoms are worsening or not improving.     Sleep   Sleep hygiene  means all of the habits that support good, restful sleep. It includes maintaining a consistent bedtime and wake time, using your bedroom only for sleeping or sex, and keeping the bedroom dark and free of distractions like a computer, smartphone, or television.     Develop a Healthy Routine  Maintain good hygiene. Get out of bed in the morning, make your bed, brush your teeth, take a shower, and get dressed. Don t spend too much time viewing media that makes you feel stressed. Find time to relax each day.    Exercise  Get some form of exercise every day. This will help reduce pain and release endorphins, the  feel good  chemicals in your brain. It can be as simple as just going for a walk or doing some gardening, anything that will get you moving.      Diet  Strive to eat healthy foods, including fruits and vegetables. Drink plenty of water. Avoid excessive sugar, caffeine, alcohol, and other mood-altering substances.     Stay Connected with Others  Stay in touch with friends and family members.    Manage Your Mood  Try deep breathing, massage therapy, biofeedback, or meditation. Take part in fun activities when you can. Try to find something to smile about each day.     Psychotherapy  Be open to working with a therapist if your provider  recommends it.     Medication  Be sure to take your medication as prescribed. Most anti-depressants need to be taken every day. It usually takes several weeks for medications to work. Not all medicines work for all people. It is important to follow-up with your provider to make sure you have a treatment plan that is working for you. Do not stop your medication abruptly without first discussing it with your provider.    Crisis Resources   These hotlines are for both adults and children. They and are open 24 hours a day, 7 days a week unless noted otherwise.      National Suicide Prevention Lifeline   5-439-430-EKYJ (3734)      Crisis Text Line    www.crisistextline.org  Text HOME to 174426 from anywhere in the [place], anytime, about any type of crisis. A live, trained crisis counselor will receive the text and respond quickly.      [place] Lifeline for LGBTQ Youth  A national crisis intervention and suicide lifeline for LGBTQ youth under 25. Provides a safe place to talk without judgement. Call 1-342.859.3341; text START to 036333 or visit www.thetrevorproject.org to talk to a trained counselor.      For [facility] crisis numbers, visit the [facility] website at:  https://[place].gov/dhs/people-we-serve/adults/health-care/mental-health/resources/crisis-contacts.jsp

## 2022-02-11 DIAGNOSIS — F32.5 MAJOR DEPRESSION IN REMISSION (H): ICD-10-CM

## 2022-02-11 NOTE — TELEPHONE ENCOUNTER
Needs yearly FASTING CPX.     Refused Prescriptions:                       Disp   Refills    FLUoxetine (PROZAC) 20 MG capsule [Pharmac*90 cap*2        Sig: TAKE 1 CAPSULE BY MOUTH EVERY DAY  Refused By: GEORGES DUENAS  Reason for Refusal: Patient needs appointment

## 2022-02-12 ENCOUNTER — MYC MEDICAL ADVICE (OUTPATIENT)
Dept: FAMILY MEDICINE | Facility: CLINIC | Age: 72
End: 2022-02-12

## 2022-02-12 DIAGNOSIS — K21.9 GASTROESOPHAGEAL REFLUX DISEASE WITHOUT ESOPHAGITIS: Primary | ICD-10-CM

## 2022-02-14 PROBLEM — K21.9 GASTROESOPHAGEAL REFLUX DISEASE WITHOUT ESOPHAGITIS: Status: ACTIVE | Noted: 2022-02-14

## 2022-02-14 NOTE — TELEPHONE ENCOUNTER
Patient is requesting to have   Pending Prescriptions:                       Disp   Refills    omeprazole (PRILOSEC) 20 MG DR capsule    30 cap*0            Sig: Take 1 capsule (20 mg) by mouth daily    Sent in instead of continuing with the famotidine. Please review patient mychart message, she stated that Dr. Gonzalez informed her that she should go back to omeprazole instead. I pended the refill for 30 days as the patient is due for her yearly medication check visit and she was informed to call and schedule.

## 2022-02-16 ENCOUNTER — APPOINTMENT (OUTPATIENT)
Dept: CARDIOLOGY | Facility: CLINIC | Age: 72
End: 2022-02-16
Attending: EMERGENCY MEDICINE
Payer: MEDICARE

## 2022-02-16 ENCOUNTER — APPOINTMENT (OUTPATIENT)
Dept: CT IMAGING | Facility: CLINIC | Age: 72
End: 2022-02-16
Attending: EMERGENCY MEDICINE
Payer: MEDICARE

## 2022-02-16 ENCOUNTER — OFFICE VISIT (OUTPATIENT)
Dept: FAMILY MEDICINE | Facility: CLINIC | Age: 72
End: 2022-02-16

## 2022-02-16 ENCOUNTER — MEDICAL CORRESPONDENCE (OUTPATIENT)
Dept: HEALTH INFORMATION MANAGEMENT | Facility: CLINIC | Age: 72
End: 2022-02-16

## 2022-02-16 ENCOUNTER — HOSPITAL ENCOUNTER (INPATIENT)
Facility: CLINIC | Age: 72
LOS: 2 days | Discharge: HOME | DRG: 165 | End: 2022-02-19
Attending: INTERNAL MEDICINE | Admitting: INTERNAL MEDICINE
Payer: MEDICARE

## 2022-02-16 ENCOUNTER — HOSPITAL ENCOUNTER (EMERGENCY)
Facility: CLINIC | Age: 72
Discharge: ANOTHER HOSPITAL | End: 2022-02-16
Attending: EMERGENCY MEDICINE | Admitting: EMERGENCY MEDICINE
Payer: MEDICARE

## 2022-02-16 ENCOUNTER — LAB (OUTPATIENT)
Dept: LAB | Facility: CLINIC | Age: 72
End: 2022-02-16
Payer: MEDICARE

## 2022-02-16 VITALS
HEART RATE: 84 BPM | DIASTOLIC BLOOD PRESSURE: 91 MMHG | SYSTOLIC BLOOD PRESSURE: 130 MMHG | TEMPERATURE: 98.1 F | OXYGEN SATURATION: 93 % | RESPIRATION RATE: 30 BRPM

## 2022-02-16 VITALS
DIASTOLIC BLOOD PRESSURE: 70 MMHG | HEART RATE: 92 BPM | WEIGHT: 184.2 LBS | OXYGEN SATURATION: 97 % | BODY MASS INDEX: 32.64 KG/M2 | TEMPERATURE: 96.8 F | HEIGHT: 63 IN | SYSTOLIC BLOOD PRESSURE: 124 MMHG

## 2022-02-16 DIAGNOSIS — I51.7 RIGHT VENTRICULAR DILATION: ICD-10-CM

## 2022-02-16 DIAGNOSIS — R06.09 DYSPNEA ON EXERTION: ICD-10-CM

## 2022-02-16 DIAGNOSIS — Z86.711 HISTORY OF PULMONARY EMBOLISM: Primary | ICD-10-CM

## 2022-02-16 DIAGNOSIS — Z86.711 HISTORY OF PULMONARY EMBOLISM: ICD-10-CM

## 2022-02-16 DIAGNOSIS — I26.99 ACUTE MASSIVE PULMONARY EMBOLISM (H): ICD-10-CM

## 2022-02-16 DIAGNOSIS — K21.9 GASTROESOPHAGEAL REFLUX DISEASE WITHOUT ESOPHAGITIS: ICD-10-CM

## 2022-02-16 DIAGNOSIS — F32.5 MAJOR DEPRESSION IN REMISSION (H): Primary | ICD-10-CM

## 2022-02-16 DIAGNOSIS — R63.5 WEIGHT GAIN: ICD-10-CM

## 2022-02-16 DIAGNOSIS — E55.9 VITAMIN D INSUFFICIENCY: ICD-10-CM

## 2022-02-16 DIAGNOSIS — E78.00 PURE HYPERCHOLESTEROLEMIA: ICD-10-CM

## 2022-02-16 DIAGNOSIS — Z83.49 FAMILY HISTORY OF THYROID DISEASE: ICD-10-CM

## 2022-02-16 DIAGNOSIS — R06.02 SOB (SHORTNESS OF BREATH): Primary | ICD-10-CM

## 2022-02-16 LAB
ALBUMIN SERPL-MCNC: 4.1 G/DL (ref 3.6–5.1)
ALBUMIN/GLOB SERPL: 1.4 {RATIO} (ref 1–2.5)
ALP SERPL-CCNC: 82 U/L (ref 33–130)
ALT 1742-6: 11 U/L (ref 0–32)
ANION GAP SERPL CALCULATED.3IONS-SCNC: 6 MMOL/L (ref 3–14)
AST 1920-8: 10 U/L (ref 0–35)
BASOPHILS # BLD AUTO: 0 10E3/UL (ref 0–0.2)
BASOPHILS NFR BLD AUTO: 0 %
BILIRUB SERPL-MCNC: 0.9 MG/DL (ref 0.2–1.2)
BUN SERPL-MCNC: 12 MG/DL (ref 7–25)
BUN SERPL-MCNC: 14 MG/DL (ref 7–30)
BUN/CREATININE RATIO: 12.1 (ref 6–22)
CALCIUM SERPL-MCNC: 9.3 MG/DL (ref 8.5–10.1)
CALCIUM SERPL-MCNC: 9.6 MG/DL (ref 8.6–10.3)
CHLORIDE BLD-SCNC: 110 MMOL/L (ref 94–109)
CHLORIDE SERPLBLD-SCNC: 107.4 MMOL/L (ref 98–110)
CHOLEST SERPL-MCNC: 232 MG/DL (ref 0–199)
CHOLEST/HDLC SERPL: 5 {RATIO} (ref 0–5)
CO2 SERPL-SCNC: 22.2 MMOL/L (ref 20–32)
CO2 SERPL-SCNC: 24 MMOL/L (ref 20–32)
COVID-19: NEGATIVE
CREAT SERPL-MCNC: 0.99 MG/DL (ref 0.6–1.3)
CREAT SERPL-MCNC: 1.03 MG/DL (ref 0.52–1.04)
D DIMER PPP FEU-MCNC: 3.94 UG/ML FEU (ref 0–0.5)
EOSINOPHIL # BLD AUTO: 0.1 10E3/UL (ref 0–0.7)
EOSINOPHIL NFR BLD AUTO: 2 %
ERYTHROCYTE [DISTWIDTH] IN BLOOD BY AUTOMATED COUNT: 13.8 % (ref 10–15)
GFR SERPL CREATININE-BSD FRML MDRD: 57 ML/MIN/1.73M2
GLOBULIN, CALCULATED - QUEST: 2.9 (ref 1.9–3.7)
GLUCOSE BLD-MCNC: 113 MG/DL (ref 70–99)
GLUCOSE SERPL-MCNC: 106 MG/DL (ref 60–99)
HCT VFR BLD AUTO: 46.3 % (ref 35–47)
HDLC SERPL-MCNC: 48 MG/DL (ref 40–150)
HGB BLD-MCNC: 14.8 G/DL (ref 11.7–15.7)
HOLD SPECIMEN: NORMAL
IMM GRANULOCYTES # BLD: 0 10E3/UL
IMM GRANULOCYTES NFR BLD: 0 %
LDLC SERPL CALC-MCNC: 156 MG/DL (ref 0–130)
LVEF ECHO: NORMAL
LYMPHOCYTES # BLD AUTO: 2 10E3/UL (ref 0.8–5.3)
LYMPHOCYTES NFR BLD AUTO: 23 %
MAGNESIUM SERPL-MCNC: 2.1 MG/DL (ref 1.6–2.3)
MCH RBC QN AUTO: 29 PG (ref 26.5–33)
MCHC RBC AUTO-ENTMCNC: 32 G/DL (ref 31.5–36.5)
MCV RBC AUTO: 91 FL (ref 78–100)
MONOCYTES # BLD AUTO: 0.6 10E3/UL (ref 0–1.3)
MONOCYTES NFR BLD AUTO: 6 %
NEUTROPHILS # BLD AUTO: 6.1 10E3/UL (ref 1.6–8.3)
NEUTROPHILS NFR BLD AUTO: 69 %
NRBC # BLD AUTO: 0 10E3/UL
NRBC BLD AUTO-RTO: 0 /100
NT-PROBNP SERPL-MCNC: 1497 PG/ML (ref 0–900)
PLATELET # BLD AUTO: 305 10E3/UL (ref 150–450)
POTASSIUM BLD-SCNC: 3.8 MMOL/L (ref 3.4–5.3)
POTASSIUM SERPL-SCNC: 4.65 MMOL/L (ref 3.5–5.3)
PROT SERPL-MCNC: 7 G/DL (ref 6.1–8.1)
RBC # BLD AUTO: 5.11 10E6/UL (ref 3.8–5.2)
SODIUM SERPL-SCNC: 139.3 MMOL/L (ref 135–146)
SODIUM SERPL-SCNC: 140 MMOL/L (ref 133–144)
T4 FREE SERPL-MCNC: 1 NG/DL (ref 0.76–1.46)
TRIGL SERPL-MCNC: 138 MG/DL (ref 0–149)
TROPONIN I SERPL HS-MCNC: 18 NG/L
TSH SERPL DL<=0.005 MIU/L-ACNC: 4.67 MU/L (ref 0.4–4)
WBC # BLD AUTO: 8.9 10E3/UL (ref 4–11)

## 2022-02-16 PROCEDURE — 84443 ASSAY THYROID STIM HORMONE: CPT | Performed by: EMERGENCY MEDICINE

## 2022-02-16 PROCEDURE — 96366 THER/PROPH/DIAG IV INF ADDON: CPT

## 2022-02-16 PROCEDURE — 93306 TTE W/DOPPLER COMPLETE: CPT

## 2022-02-16 PROCEDURE — 84484 ASSAY OF TROPONIN QUANT: CPT | Performed by: EMERGENCY MEDICINE

## 2022-02-16 PROCEDURE — 87635 SARS-COV-2 COVID-19 AMP PRB: CPT | Performed by: PHYSICIAN ASSISTANT

## 2022-02-16 PROCEDURE — 96376 TX/PRO/DX INJ SAME DRUG ADON: CPT

## 2022-02-16 PROCEDURE — 99223 1ST HOSP IP/OBS HIGH 75: CPT | Mod: 25 | Performed by: INTERNAL MEDICINE

## 2022-02-16 PROCEDURE — 80048 BASIC METABOLIC PNL TOTAL CA: CPT | Performed by: EMERGENCY MEDICINE

## 2022-02-16 PROCEDURE — 250N000009 HC RX 250: Performed by: EMERGENCY MEDICINE

## 2022-02-16 PROCEDURE — 99285 EMERGENCY DEPT VISIT HI MDM: CPT | Mod: 25

## 2022-02-16 PROCEDURE — G1004 CDSM NDSC: HCPCS

## 2022-02-16 PROCEDURE — 84439 ASSAY OF FREE THYROXINE: CPT | Performed by: EMERGENCY MEDICINE

## 2022-02-16 PROCEDURE — 93005 ELECTROCARDIOGRAM TRACING: CPT | Mod: 76

## 2022-02-16 PROCEDURE — 93306 TTE W/DOPPLER COMPLETE: CPT | Mod: 26 | Performed by: INTERNAL MEDICINE

## 2022-02-16 PROCEDURE — G2023 SPECIMEN COLLECT COVID-19: HCPCS | Performed by: PHYSICIAN ASSISTANT

## 2022-02-16 PROCEDURE — 71275 CT ANGIOGRAPHY CHEST: CPT | Mod: ME

## 2022-02-16 PROCEDURE — 93005 ELECTROCARDIOGRAM TRACING: CPT

## 2022-02-16 PROCEDURE — 71046 X-RAY EXAM CHEST 2 VIEWS: CPT | Performed by: PHYSICIAN ASSISTANT

## 2022-02-16 PROCEDURE — 83735 ASSAY OF MAGNESIUM: CPT | Performed by: EMERGENCY MEDICINE

## 2022-02-16 PROCEDURE — 250N000011 HC RX IP 250 OP 636: Performed by: EMERGENCY MEDICINE

## 2022-02-16 PROCEDURE — 85027 COMPLETE CBC AUTOMATED: CPT | Performed by: STUDENT IN AN ORGANIZED HEALTH CARE EDUCATION/TRAINING PROGRAM

## 2022-02-16 PROCEDURE — 36415 COLL VENOUS BLD VENIPUNCTURE: CPT | Performed by: PHYSICIAN ASSISTANT

## 2022-02-16 PROCEDURE — 85379 FIBRIN DEGRADATION QUANT: CPT

## 2022-02-16 PROCEDURE — 36415 COLL VENOUS BLD VENIPUNCTURE: CPT | Performed by: EMERGENCY MEDICINE

## 2022-02-16 PROCEDURE — 80061 LIPID PANEL: CPT | Performed by: PHYSICIAN ASSISTANT

## 2022-02-16 PROCEDURE — 85025 COMPLETE CBC W/AUTO DIFF WBC: CPT | Performed by: EMERGENCY MEDICINE

## 2022-02-16 PROCEDURE — 80053 COMPREHEN METABOLIC PANEL: CPT | Performed by: PHYSICIAN ASSISTANT

## 2022-02-16 PROCEDURE — 36415 COLL VENOUS BLD VENIPUNCTURE: CPT | Performed by: STUDENT IN AN ORGANIZED HEALTH CARE EDUCATION/TRAINING PROGRAM

## 2022-02-16 PROCEDURE — 83880 ASSAY OF NATRIURETIC PEPTIDE: CPT | Performed by: EMERGENCY MEDICINE

## 2022-02-16 PROCEDURE — 96365 THER/PROPH/DIAG IV INF INIT: CPT | Mod: 59

## 2022-02-16 PROCEDURE — 36415 COLL VENOUS BLD VENIPUNCTURE: CPT

## 2022-02-16 PROCEDURE — 99214 OFFICE O/P EST MOD 30 MIN: CPT | Performed by: PHYSICIAN ASSISTANT

## 2022-02-16 RX ORDER — HEPARIN SODIUM 10000 [USP'U]/100ML
0-5000 INJECTION, SOLUTION INTRAVENOUS CONTINUOUS
Status: DISCONTINUED | OUTPATIENT
Start: 2022-02-16 | End: 2022-02-16 | Stop reason: HOSPADM

## 2022-02-16 RX ORDER — IOPAMIDOL 755 MG/ML
500 INJECTION, SOLUTION INTRAVASCULAR ONCE
Status: COMPLETED | OUTPATIENT
Start: 2022-02-16 | End: 2022-02-16

## 2022-02-16 RX ORDER — HEPARIN SODIUM 10000 [USP'U]/100ML
0-5000 INJECTION, SOLUTION INTRAVENOUS CONTINUOUS
Status: DISPENSED | OUTPATIENT
Start: 2022-02-16 | End: 2022-02-18

## 2022-02-16 RX ADMIN — HEPARIN SODIUM AND DEXTROSE 1500 UNITS/HR: 10000; 5 INJECTION INTRAVENOUS at 18:25

## 2022-02-16 RX ADMIN — SODIUM CHLORIDE 84 ML: 9 INJECTION, SOLUTION INTRAVENOUS at 17:06

## 2022-02-16 RX ADMIN — IOPAMIDOL 63 ML: 755 INJECTION, SOLUTION INTRAVENOUS at 17:06

## 2022-02-16 ASSESSMENT — ACTIVITIES OF DAILY LIVING (ADL)
DOING_ERRANDS_INDEPENDENTLY_DIFFICULTY: NO
CONCENTRATING,_REMEMBERING_OR_MAKING_DECISIONS_DIFFICULTY: NO
WALKING_OR_CLIMBING_STAIRS_DIFFICULTY: NO
WEAR_GLASSES_OR_BLIND: YES
FALL_HISTORY_WITHIN_LAST_SIX_MONTHS: NO
DRESSING/BATHING_DIFFICULTY: NO
DIFFICULTY_EATING/SWALLOWING: NO
TOILETING_ISSUES: NO

## 2022-02-16 ASSESSMENT — ENCOUNTER SYMPTOMS
DIAPHORESIS: 0
PALPITATIONS: 0
MYALGIAS: 0
ABDOMINAL PAIN: 0
COUGH: 0
DIZZINESS: 0
APPETITE CHANGE: 0
SHORTNESS OF BREATH: 1
HEADACHES: 0

## 2022-02-16 ASSESSMENT — ANXIETY QUESTIONNAIRES
2. NOT BEING ABLE TO STOP OR CONTROL WORRYING: NOT AT ALL
GAD7 TOTAL SCORE: 0
7. FEELING AFRAID AS IF SOMETHING AWFUL MIGHT HAPPEN: NOT AT ALL
1. FEELING NERVOUS, ANXIOUS, OR ON EDGE: NOT AT ALL
6. BECOMING EASILY ANNOYED OR IRRITABLE: NOT AT ALL
3. WORRYING TOO MUCH ABOUT DIFFERENT THINGS: NOT AT ALL
5. BEING SO RESTLESS THAT IT IS HARD TO SIT STILL: NOT AT ALL

## 2022-02-16 ASSESSMENT — PATIENT HEALTH QUESTIONNAIRE - PHQ9
5. POOR APPETITE OR OVEREATING: NOT AT ALL
SUM OF ALL RESPONSES TO PHQ QUESTIONS 1-9: 3

## 2022-02-16 NOTE — NURSING NOTE
Chief Complaint   Patient presents with     Recheck Medication     Breathing Problem     covid test neg 2 weeks ago, can't catch breath     Pre-Visit Screening:  Immunizations:UTD  Colonoscopy:NA  Mammogram:Na  Asthma Action Test/Plan:NA  PHQ9:today  GAD7:today  Questioned patient about current smoking habits Pt.former  OK to leave a detailed message on voice mail for today's visit yes, phone # [phone]

## 2022-02-16 NOTE — ED TRIAGE NOTES
SOB started 10 days ago. Dyspnea on exertion. Ddimer drawn this morning was elevated. Pt sent to ER for CT PE run.

## 2022-02-16 NOTE — PROGRESS NOTES
Assessment & Plan     Major depression in remission (H)    - FLUoxetine (PROZAC) 20 MG capsule  Dispense: 90 capsule; Refill: 3    Pure hypercholesterolemia    - VENOUS COLLECTION  - Comprehensive Metobolic Panel ([facility])  - Lipid Panel ([facility])    Vitamin D insufficiency      Gastroesophageal reflux disease without esophagitis    - omeprazole (PRILOSEC) 20 MG DR capsule  Dispense: 90 capsule; Refill: 3    Dyspnea on exertion  D- DIMER STAT  IF + ER  IF NEG SEE ME 2 WEEKS  - COVID-19 ([facility])  - RI SPECIMEN COLLECT COVID-19  - XR Chest 2 Views    History of pulmonary embolism    - XR Chest 2 Views    Family history of thyroid disease  - TSH WITH FREE T4 REFLEX ([facility])    Weight gain    - TSH WITH FREE T4 REFLEX ([facility])      ADDENDUM: D-DIMER ELEVATED - PT NOTIFIED AND WILL GO TO ER      RADHA Brown  [facility]    Subjective     Nursing Notes:   Paige Dyson CMA  2/16/2022 10:07 AM  Signed  Chief Complaint   Patient presents with     Recheck Medication     Breathing Problem     covid test neg 2 weeks ago, can't catch breath     Pre-Visit Screening:  Immunizations:UTD  Colonoscopy:NA  Mammogram:Na  Asthma Action Test/Plan:NA  PHQ9:today  GAD7:today  Questioned patient about current smoking habits Pt.former  OK to leave a detailed message on voice mail for today's visit yes, phone # [phone]         Paige Dyson CMA  2/16/2022 10:11 AM  Signed  Oxygen was 95 when walking around pulse was around 100.            Nataly Kee is a 71 year old female who presents to clinic today for the following health issues     HPI      1. Depression  - on Prozac  Wants to stop  Been on for years  When she does stop she feels sorry for self    2. GERD  - omeprazole controlled    3. Cholesterol very elevated - not on statin      4. Beginning of Feb had URI  Home COVID test was negative  [facility] felt very out of breath.   2nd week skipped gym because of SOB due to  "SOB  First Friday of Feb    Both daughters have had severe SOB after URI as well.             Wt Readings from Last 5 Encounters:   02/16/22 83.6 kg (184 lb 3.2 oz)   12/01/21 82.8 kg (182 lb 9.6 oz)   07/07/21 74.8 kg (165 lb)   04/29/21 78.8 kg (173 lb 12.8 oz)   03/01/21 78.8 kg (173 lb 12.8 oz)             Current Medications  Current Outpatient Medications   Medication Sig Dispense Refill     Cholecalciferol (VITAMIN D) 125 MCG (5000 UT) capsule Take 1 capsule by mouth daily 2000 international unit(s)       FLUoxetine (PROZAC) 20 MG capsule TAKE 1 CAPSULE BY MOUTH EVERY DAY 90 capsule 3     omeprazole (PRILOSEC) 20 MG DR capsule Take 1 capsule (20 mg) by mouth daily 90 capsule 3         Constitutional, HEENT, Cardiovascular, Pulmonary, GI and  systems are negative, except as otherwise noted.          Objective    /70 (BP Location: Left arm, Patient Position: Sitting, Cuff Size: Adult Large)   Pulse 92   Temp 96.8  F (36  C)   Ht 1.6 m (5' 3\")   Wt 83.6 kg (184 lb 3.2 oz)   LMP 09/03/2001   SpO2 97%   BMI 32.63 kg/m    Body mass index is 32.63 kg/m .  Physical Exam   GENERAL: healthy, alert and no distress  EYES: Eyes grossly normal to inspection, PERRL and conjunctivae and sclerae normal  HENT: ear canals and TM's normal, nose and mouth without ulcers or lesions  NECK: no adenopathy, no asymmetry, masses, or scars and thyroid normal to palpation  RESP: lungs clear to auscultation - no rales, rhonchi or wheezes  Patient is dyspneic with exertion   CV: regular rate and rhythm, normal S1 S2, no S3 or S4, no murmur, click or rub, no peripheral edema and peripheral pulses strong  Abdomen: Normal bowel sounds. Soft, no masses appreciated, no organomegaly. Non-tender. No guarding, no rebound tenderness.       Mental Status Exam: 2  Appearance: calm  Grooming: adequately groomed  Demeanor: engaged, cooperative  Affect: normal  Speech: Normal.  Gait:Normal.  Movements: Normal  Form of thought: Logical, " Linear and Goal directed  Thought content:  Normal  Insight:Good   Judgment: Good   Cognition: Good

## 2022-02-16 NOTE — ED PROVIDER NOTES
History   Chief Complaint:  Shortness of Breath and Referral       The history is provided by the patient.      Nataly Kee is a 71 year old female with history of pulmonary embolism, hypercholesterolemia, and GERD who presents with shortness of breath. The patient reports shortness of breath beginning 10 days ago which has persisted, worsened when going up or down stairs. At this time, she notes no shortness of breath at rest. The patient endorses an earache and subjective fever, with a temperature of 99 F, 10 days ago. Her fever and ear pain were reportedly resolved with ibuprofen. She is not taking any blood thinners. The patient reportedly drinks 2.5 cups of coffee a day. She denies any recent surgery, immobilization, or travel. The patient states she is not a smoker, heavy alcohol user, and does not drink energy drinks. She denies feeling a rapid heart rate associated with the shortness of breath. The patient denies experiencing any cough, congestion, headache, body aches, diaphoresis, or dizziness. She also denies chest pain, abdominal pain, leg swelling, or leg pain.     Laboratory from [facility] on 2/16/22:   Ddimer: 3.94 (H)   Symptomatic Influenza A/B & SARS-CoV2 (COVID19) Virus PCR Multiplex: Negative    Imaging from [facility] on 2/16/22:  XR Chest 2 Views:    Small hiatal hernia. Otherwise negative chest.     Review of Systems   Constitutional: Negative for appetite change and diaphoresis.   HENT: Negative for congestion.    Respiratory: Positive for shortness of breath. Negative for cough.    Cardiovascular: Negative for chest pain, palpitations and leg swelling.   Gastrointestinal: Negative for abdominal pain.   Musculoskeletal: Negative for myalgias.   Neurological: Negative for dizziness and headaches.   All other systems reviewed and are negative.      Allergies:  No Known Drug Allergies     Medications:  Cholecalciferol (VITAMIN D) 125 MCG (5000 UT)  capsule  FLUoxetine (PROZAC) 20 MG capsule   omeprazole (PRILOSEC) 20 MG DR capsule    Past Medical History:     Basal cell carcinoma  Colonic polyps  Depression    GERD without esophagitis  PE  Pure hypercholesterolemia  Vitamin D insufficiency      Past Surgical History:    Colonoscopy x2  Tonsillectomy  Fallopian tube ligation  Hysterectomy    Family History:    Mother: CAD, heart disease, Hodgkin's lymphoma, cerebrovascular disease, depression   Father: CAD, emphysema, abdominal aneurysm  Sister: Multiple sclerosis, hypertension     Social History:  The patient presents to the emergency department with her daughter.   Patient arrives via car    Physical Exam     Patient Vitals for the past 24 hrs:   BP Temp Temp src Pulse Resp SpO2   02/16/22 1915 (!) 142/96 -- -- 87 -- --   02/16/22 1845 (!) 143/86 -- -- 86 -- 98 %   02/16/22 1746 -- -- -- 85 -- 94 %   02/16/22 1745 (!) 129/96 -- -- 86 -- 94 %   02/16/22 1500 (!) 141/78 -- -- 89 -- 95 %   02/16/22 1434 (!) 157/100 98.1  F (36.7  C) Temporal 97 30 94 %       Physical Exam  General: Elderly male sitting upright  Eyes: PERRL, Conjunctive within normal limits.  No scleral icterus.  ENT: Moist mucous membranes, oropharynx clear.   CV: Normal S1S2, no murmur, rub or gallop. Regular rate and rhythm  Resp: Clear to auscultation bilaterally, no wheezes, rales or rhonchi.  Tachypneic.  Mildly increased work of breathing.    GI: Abdomen is soft, nontender and nondistended. No palpable masses. No rebound or guarding.  MSK: No edema. Nontender. Normal active range of motion.  Skin: Warm and dry. No rashes or lesions or ecchymoses on visible skin.  Neuro: Alert and oriented. Responds appropriately to all questions and commands. No focal findings appreciated. Normal muscle tone.  Psych: Normal mood and affect. Pleasant.    Emergency Department Course   ECG 1:   ECG obtained at 1522, ECG read at 1523  Sinus tachycardia with occasional premature ventricular complexes  Low  voltage QRS   Nonspecific T wave abnormality   Rate 132 bpm. KY interval 124 ms. QRS duration 74 ms. QT/QTc 368/545 ms. P-R-T axes * 19 -39.     ECG 2:   ECG taken at 1523, ECG read at 1523  Normal sinus rhythm   Rate 85 bpm. KY interval 124 ms. QRS duration 72 ms. QT/QTc 376/447 ms. P-R-T axes * 15 2.    Imaging:  Echocardiogram Complete   Left ventricular systolic function is normal. The visual ejection fraction is    60-65%. No regional wall motion abnormalities noted.    The right ventricle is moderately dilated. The right ventricular systolic   function is mild to moderately reduced.    There is mild (1+) tricuspid regurgitation.    Pulmonary artery systolic pressure is severely elevated at at ~70 mmHg.    No prior study for comparison.   Results discussed with Dr. Call at 7:50 pm.  Reading per cardiology.     CT Chest Pulmonary Embolism w Contrast   1.  Acute pulmonary embolism is present, large embolic burden beginning centrally with evidence for elevated right heart pressure.   2.  Results were discussed with Dr. Call at 5:30 PM 02/16/2022.  Report per radiology    Laboratory:  Labs Ordered and Resulted from Time of ED Arrival to Time of ED Departure   BASIC METABOLIC PANEL - Abnormal       Result Value    Sodium 140      Potassium 3.8      Chloride 110 (*)     Carbon Dioxide (CO2) 24      Anion Gap 6      Urea Nitrogen 14      Creatinine 1.03      Calcium 9.3      Glucose 113 (*)     GFR Estimate 57 (*)    NT PROBNP INPATIENT - Abnormal    N terminal Pro BNP Inpatient 1,497 (*)    TSH WITH FREE T4 REFLEX - Abnormal    TSH 4.67 (*)    TROPONIN I - Normal    Troponin I High Sensitivity 18     MAGNESIUM - Normal    Magnesium 2.1     T4 FREE - Normal    Free T4 1.00     CBC WITH PLATELETS AND DIFFERENTIAL    WBC Count 8.9      RBC Count 5.11      Hemoglobin 14.8      Hematocrit 46.3      MCV 91      MCH 29.0      MCHC 32.0      RDW 13.8      Platelet Count 305      % Neutrophils 69      % Lymphocytes 23       % Monocytes 6      % Eosinophils 2      % Basophils 0      % Immature Granulocytes 0      NRBCs per 100 WBC 0      Absolute Neutrophils 6.1      Absolute Lymphocytes 2.0      Absolute Monocytes 0.6      Absolute Eosinophils 0.1      Absolute Basophils 0.0      Absolute Immature Granulocytes 0.0      Absolute NRBCs 0.0          Emergency Department Course:     Reviewed:  I reviewed nursing notes, vitals, past medical history, Care Everywhere and [facility]    Assessments:  1503 I obtained history and examined the patient as noted above.   1654 I reassessed the patient.  She denies any new concerns.  She is stable.  1828 I rechecked the patient and explained findings.   2126 I again checked on the patient.  She is hemodynamically stable.  She is denying any worsening of symptoms.  At rest she is feeling comfortable.    Consults:  1731 I spoke with Niranjan Chen MD from radiology regarding the patient.   1741  I spoke with Dr. Knight with interventional radiology regarding the patient.   1751 I spoke with Dr. Garcia  with interventional radiology regarding the patient.   1811 I spoke again with Dr. Garcia regarding the patient.    1817 I spoke with Dr. Espinoza with cardiology regarding the patient.   1820 I spoke with Dr. Garcia again regarding the patient.   1949 I spoke again with Dr. Espinoza of cardiology regarding the patient.   2044 I discussed the patient again with Dr. Garcia of interventional radiology.   2052 I had another discussion with Dr. Espinoza, who agreed to admit the patient.     Interventions:  1824: heparin ANTICOAGULANT Loading dose for HIGH INTENSITY TREATMENT administered 6700 units  1825: heparin infusion 25,000 units in D5W 250 mL ANTICOAGULANT, IV infusion 1500 units/h    Disposition:  The patient was transferred to [facility] via ambulance. Dr. Espinoza accepted the patient for transfer.     Impression & Plan     Medical Decision Making:  Nataly Kee is a 71 year old female  who presents for evaluation of Hartness of breath with an elevated D-dimer at outpatient clinic.  On arrival to the emergency department she was noted to be mildly hypertensive but not hypoxic or tachypneic.  Her oxygen saturations were within normal range although on the lower range of normal.  CT scan of the chest was performed in the emergency department.  The workup in the Emergency Department indicates that her shortness of breath is due to pulmonary embolism.  There is significant clot burden with evidence of right heart strain on CT scan, so.  Consult was made with IR and cardiology at [facility].  Dr. Garcia of interventional radiology preferred echocardiogram before deciding on ultimate therapy like thromboectomy.  This was performed and show evidence of moderate right ventricular dilatation and dysfunction mild tricuspid regurg.  This was again discussed with Dr. Garcia of interventional radiology who agreed that this patient may benefit from thrombectomy.  This required transfer to the .  This was discussed with the patient, and she was agreeable to transfer.  Heparin loading dose and infusion were initiated after discussion with the patient after clarification of any contraindications to this therapy. There were none and the patient is agreeable to use of the heparin which was administered after CT findings.  No indication for emergent systemic thrombolytics.  The patient remained stable in the ED and was transferred in stable condition to [facility].  All questions were answered prior to transfer.    Diagnosis:    ICD-10-CM    1. Acute massive pulmonary embolism (H)  I26.99    2. Right ventricular dilation  I51.7        Scribe Disclosure:  Laurie JAQUEZ, am serving as a scribe at 3:06 PM on 2/16/2022 to document services personally performed by Kelly Call MD based on my observations and the provider's statements to me.      I, Charan Ribeiro, am serving as a scribe  on 2/16/2022 at 9:02 PM to document services personally performed by Kelly Call MD based on my observations and the provider's statements to me.            Kelly Call MD  02/16/22 [id number]

## 2022-02-17 ENCOUNTER — APPOINTMENT (OUTPATIENT)
Dept: ULTRASOUND IMAGING | Facility: CLINIC | Age: 72
DRG: 165 | End: 2022-02-17
Attending: STUDENT IN AN ORGANIZED HEALTH CARE EDUCATION/TRAINING PROGRAM
Payer: MEDICARE

## 2022-02-17 ENCOUNTER — APPOINTMENT (OUTPATIENT)
Dept: INTERVENTIONAL RADIOLOGY/VASCULAR | Facility: CLINIC | Age: 72
DRG: 165 | End: 2022-02-17
Attending: NURSE PRACTITIONER
Payer: MEDICARE

## 2022-02-17 PROBLEM — I26.99 PULMONARY EMBOLISM (H): Status: ACTIVE | Noted: 2022-02-17

## 2022-02-17 LAB
ACT BLD: 131 SECONDS (ref 74–150)
ACT BLD: 207 SECONDS (ref 74–150)
ACT BLD: 211 SECONDS (ref 74–150)
ACT BLD: 250 SECONDS (ref 74–150)
ALBUMIN SERPL-MCNC: 3 G/DL (ref 3.4–5)
ALP SERPL-CCNC: 82 U/L (ref 40–150)
ALT SERPL W P-5'-P-CCNC: 12 U/L (ref 0–50)
ANION GAP SERPL CALCULATED.3IONS-SCNC: 8 MMOL/L (ref 3–14)
AST SERPL W P-5'-P-CCNC: 16 U/L (ref 0–45)
ATRIAL RATE - MUSE: 132 BPM
ATRIAL RATE - MUSE: 85 BPM
BILIRUB SERPL-MCNC: 0.7 MG/DL (ref 0.2–1.3)
BUN SERPL-MCNC: 13 MG/DL (ref 7–30)
CALCIUM SERPL-MCNC: 9.1 MG/DL (ref 8.5–10.1)
CHLORIDE BLD-SCNC: 112 MMOL/L (ref 94–109)
CO2 SERPL-SCNC: 22 MMOL/L (ref 20–32)
CREAT SERPL-MCNC: 0.9 MG/DL (ref 0.52–1.04)
DIASTOLIC BLOOD PRESSURE - MUSE: NORMAL MMHG
DIASTOLIC BLOOD PRESSURE - MUSE: NORMAL MMHG
ERYTHROCYTE [DISTWIDTH] IN BLOOD BY AUTOMATED COUNT: 14 % (ref 10–15)
ERYTHROCYTE [DISTWIDTH] IN BLOOD BY AUTOMATED COUNT: 14.2 % (ref 10–15)
GFR SERPL CREATININE-BSD FRML MDRD: 68 ML/MIN/1.73M2
GLUCOSE BLD-MCNC: 104 MG/DL (ref 70–99)
HCT VFR BLD AUTO: 42.4 % (ref 35–47)
HCT VFR BLD AUTO: 44.1 % (ref 35–47)
HGB BLD-MCNC: 10.8 G/DL (ref 11.7–15.7)
HGB BLD-MCNC: 13.3 G/DL (ref 11.7–15.7)
HGB BLD-MCNC: 14.2 G/DL (ref 11.7–15.7)
HOLD SPECIMEN: NORMAL
INR PPP: 1.13 (ref 0.85–1.15)
INTERPRETATION ECG - MUSE: NORMAL
INTERPRETATION ECG - MUSE: NORMAL
MCH RBC QN AUTO: 28.2 PG (ref 26.5–33)
MCH RBC QN AUTO: 28.4 PG (ref 26.5–33)
MCHC RBC AUTO-ENTMCNC: 31.4 G/DL (ref 31.5–36.5)
MCHC RBC AUTO-ENTMCNC: 32.2 G/DL (ref 31.5–36.5)
MCV RBC AUTO: 88 FL (ref 78–100)
MCV RBC AUTO: 90 FL (ref 78–100)
P AXIS - MUSE: NORMAL DEGREES
P AXIS - MUSE: NORMAL DEGREES
PLATELET # BLD AUTO: 278 10E3/UL (ref 150–450)
PLATELET # BLD AUTO: 305 10E3/UL (ref 150–450)
POTASSIUM BLD-SCNC: 4.2 MMOL/L (ref 3.4–5.3)
PR INTERVAL - MUSE: 124 MS
PR INTERVAL - MUSE: 124 MS
PROT SERPL-MCNC: 6.6 G/DL (ref 6.8–8.8)
QRS DURATION - MUSE: 72 MS
QRS DURATION - MUSE: 74 MS
QT - MUSE: 368 MS
QT - MUSE: 376 MS
QTC - MUSE: 447 MS
QTC - MUSE: 545 MS
R AXIS - MUSE: 15 DEGREES
R AXIS - MUSE: 19 DEGREES
RBC # BLD AUTO: 4.71 10E6/UL (ref 3.8–5.2)
RBC # BLD AUTO: 5 10E6/UL (ref 3.8–5.2)
SARS-COV-2 RNA RESP QL NAA+PROBE: NEGATIVE
SODIUM SERPL-SCNC: 142 MMOL/L (ref 133–144)
SYSTOLIC BLOOD PRESSURE - MUSE: NORMAL MMHG
SYSTOLIC BLOOD PRESSURE - MUSE: NORMAL MMHG
T AXIS - MUSE: -39 DEGREES
T AXIS - MUSE: 2 DEGREES
T4, FREE, NON-DIALYSIS - QUEST: 1.4 NG/DL (ref 0.8–1.8)
TROPONIN I SERPL HS-MCNC: 14 NG/L
TSH SERPL-ACNC: 5.81 MIU/L (ref 0.4–4.5)
UFH PPP CHRO-ACNC: 0.38 IU/ML
UFH PPP CHRO-ACNC: 0.71 IU/ML
UFH PPP CHRO-ACNC: 0.72 IU/ML
UFH PPP CHRO-ACNC: >1.1 IU/ML
VENTRICULAR RATE- MUSE: 132 BPM
VENTRICULAR RATE- MUSE: 85 BPM
WBC # BLD AUTO: 7.6 10E3/UL (ref 4–11)
WBC # BLD AUTO: 8 10E3/UL (ref 4–11)

## 2022-02-17 PROCEDURE — 85520 HEPARIN ASSAY: CPT | Performed by: STUDENT IN AN ORGANIZED HEALTH CARE EDUCATION/TRAINING PROGRAM

## 2022-02-17 PROCEDURE — 86769 SARS-COV-2 COVID-19 ANTIBODY: CPT | Performed by: PHYSICIAN ASSISTANT

## 2022-02-17 PROCEDURE — C1887 CATHETER, GUIDING: HCPCS

## 2022-02-17 PROCEDURE — 02CQ4ZZ EXTIRPATION OF MATTER FROM RIGHT PULMONARY ARTERY, PERCUTANEOUS ENDOSCOPIC APPROACH: ICD-10-PCS | Performed by: RADIOLOGY

## 2022-02-17 PROCEDURE — 76937 US GUIDE VASCULAR ACCESS: CPT | Mod: 26 | Performed by: RADIOLOGY

## 2022-02-17 PROCEDURE — 99152 MOD SED SAME PHYS/QHP 5/>YRS: CPT | Mod: GC | Performed by: RADIOLOGY

## 2022-02-17 PROCEDURE — 999N000128 HC STATISTIC PERIPHERAL IV START W/O US GUIDANCE

## 2022-02-17 PROCEDURE — 02CR4ZZ EXTIRPATION OF MATTER FROM LEFT PULMONARY ARTERY, PERCUTANEOUS ENDOSCOPIC APPROACH: ICD-10-PCS | Performed by: RADIOLOGY

## 2022-02-17 PROCEDURE — 250N000011 HC RX IP 250 OP 636: Performed by: STUDENT IN AN ORGANIZED HEALTH CARE EDUCATION/TRAINING PROGRAM

## 2022-02-17 PROCEDURE — 250N000009 HC RX 250

## 2022-02-17 PROCEDURE — 75743 ARTERY X-RAYS LUNGS: CPT

## 2022-02-17 PROCEDURE — 258N000003 HC RX IP 258 OP 636: Performed by: RADIOLOGY

## 2022-02-17 PROCEDURE — 250N000013 HC RX MED GY IP 250 OP 250 PS 637: Performed by: STUDENT IN AN ORGANIZED HEALTH CARE EDUCATION/TRAINING PROGRAM

## 2022-02-17 PROCEDURE — 36415 COLL VENOUS BLD VENIPUNCTURE: CPT | Performed by: RADIOLOGY

## 2022-02-17 PROCEDURE — 36015 PLACE CATHETER IN ARTERY: CPT | Mod: XS | Performed by: RADIOLOGY

## 2022-02-17 PROCEDURE — 85018 HEMOGLOBIN: CPT | Performed by: RADIOLOGY

## 2022-02-17 PROCEDURE — 85027 COMPLETE CBC AUTOMATED: CPT | Performed by: STUDENT IN AN ORGANIZED HEALTH CARE EDUCATION/TRAINING PROGRAM

## 2022-02-17 PROCEDURE — 214N000001 HC R&B CCU UMMC

## 2022-02-17 PROCEDURE — 84484 ASSAY OF TROPONIN QUANT: CPT | Performed by: STUDENT IN AN ORGANIZED HEALTH CARE EDUCATION/TRAINING PROGRAM

## 2022-02-17 PROCEDURE — 85347 COAGULATION TIME ACTIVATED: CPT

## 2022-02-17 PROCEDURE — 99233 SBSQ HOSP IP/OBS HIGH 50: CPT | Mod: GC | Performed by: INTERNAL MEDICINE

## 2022-02-17 PROCEDURE — 85520 HEPARIN ASSAY: CPT | Performed by: INTERNAL MEDICINE

## 2022-02-17 PROCEDURE — 93970 EXTREMITY STUDY: CPT

## 2022-02-17 PROCEDURE — C1769 GUIDE WIRE: HCPCS

## 2022-02-17 PROCEDURE — U0003 INFECTIOUS AGENT DETECTION BY NUCLEIC ACID (DNA OR RNA); SEVERE ACUTE RESPIRATORY SYNDROME CORONAVIRUS 2 (SARS-COV-2) (CORONAVIRUS DISEASE [COVID-19]), AMPLIFIED PROBE TECHNIQUE, MAKING USE OF HIGH THROUGHPUT TECHNOLOGIES AS DESCRIBED BY CMS-2020-01-R: HCPCS | Performed by: STUDENT IN AN ORGANIZED HEALTH CARE EDUCATION/TRAINING PROGRAM

## 2022-02-17 PROCEDURE — 37185 PRIM ART M-THRMBC SBSQ VSL: CPT | Mod: 50

## 2022-02-17 PROCEDURE — 272N000143 HC KIT CR3

## 2022-02-17 PROCEDURE — 37184 PRIM ART M-THRMBC 1ST VSL: CPT | Mod: 50 | Performed by: RADIOLOGY

## 2022-02-17 PROCEDURE — C1757 CATH, THROMBECTOMY/EMBOLECT: HCPCS

## 2022-02-17 PROCEDURE — 99153 MOD SED SAME PHYS/QHP EA: CPT

## 2022-02-17 PROCEDURE — 250N000011 HC RX IP 250 OP 636

## 2022-02-17 PROCEDURE — 36415 COLL VENOUS BLD VENIPUNCTURE: CPT | Performed by: PHYSICIAN ASSISTANT

## 2022-02-17 PROCEDURE — 36415 COLL VENOUS BLD VENIPUNCTURE: CPT | Performed by: INTERNAL MEDICINE

## 2022-02-17 PROCEDURE — 272N000504 HC NEEDLE CR4

## 2022-02-17 PROCEDURE — 85610 PROTHROMBIN TIME: CPT | Performed by: STUDENT IN AN ORGANIZED HEALTH CARE EDUCATION/TRAINING PROGRAM

## 2022-02-17 PROCEDURE — 80053 COMPREHEN METABOLIC PANEL: CPT | Performed by: STUDENT IN AN ORGANIZED HEALTH CARE EDUCATION/TRAINING PROGRAM

## 2022-02-17 PROCEDURE — 37185 PRIM ART M-THRMBC SBSQ VSL: CPT | Mod: XS | Performed by: RADIOLOGY

## 2022-02-17 PROCEDURE — 36415 COLL VENOUS BLD VENIPUNCTURE: CPT | Performed by: STUDENT IN AN ORGANIZED HEALTH CARE EDUCATION/TRAINING PROGRAM

## 2022-02-17 PROCEDURE — 36015 PLACE CATHETER IN ARTERY: CPT | Mod: 50

## 2022-02-17 PROCEDURE — 255N000002 HC RX 255 OP 636: Performed by: INTERNAL MEDICINE

## 2022-02-17 PROCEDURE — 85520 HEPARIN ASSAY: CPT | Performed by: NURSE PRACTITIONER

## 2022-02-17 PROCEDURE — 99152 MOD SED SAME PHYS/QHP 5/>YRS: CPT

## 2022-02-17 PROCEDURE — 272N000566 HC SHEATH CR3

## 2022-02-17 PROCEDURE — 75825 VEIN X-RAY TRUNK: CPT

## 2022-02-17 PROCEDURE — 36015 PLACE CATHETER IN ARTERY: CPT | Mod: 50,XS

## 2022-02-17 PROCEDURE — 37184 PRIM ART M-THRMBC 1ST VSL: CPT | Mod: 50

## 2022-02-17 PROCEDURE — 258N000003 HC RX IP 258 OP 636: Performed by: STUDENT IN AN ORGANIZED HEALTH CARE EDUCATION/TRAINING PROGRAM

## 2022-02-17 PROCEDURE — 36014 PLACE CATHETER IN ARTERY: CPT | Mod: 50

## 2022-02-17 PROCEDURE — 85018 HEMOGLOBIN: CPT | Performed by: STUDENT IN AN ORGANIZED HEALTH CARE EDUCATION/TRAINING PROGRAM

## 2022-02-17 PROCEDURE — 93970 EXTREMITY STUDY: CPT | Mod: 26 | Performed by: RADIOLOGY

## 2022-02-17 PROCEDURE — 36415 COLL VENOUS BLD VENIPUNCTURE: CPT | Performed by: NURSE PRACTITIONER

## 2022-02-17 RX ORDER — MAGNESIUM HYDROXIDE/ALUMINUM HYDROXICE/SIMETHICONE 120; 1200; 1200 MG/30ML; MG/30ML; MG/30ML
30 SUSPENSION ORAL EVERY 4 HOURS PRN
Status: DISCONTINUED | OUTPATIENT
Start: 2022-02-17 | End: 2022-02-19 | Stop reason: HOSPADM

## 2022-02-17 RX ORDER — NALOXONE HYDROCHLORIDE 0.4 MG/ML
0.4 INJECTION, SOLUTION INTRAMUSCULAR; INTRAVENOUS; SUBCUTANEOUS
Status: DISCONTINUED | OUTPATIENT
Start: 2022-02-17 | End: 2022-02-17 | Stop reason: HOSPADM

## 2022-02-17 RX ORDER — FENTANYL CITRATE 50 UG/ML
25-50 INJECTION, SOLUTION INTRAMUSCULAR; INTRAVENOUS EVERY 5 MIN PRN
Status: DISCONTINUED | OUTPATIENT
Start: 2022-02-17 | End: 2022-02-17 | Stop reason: HOSPADM

## 2022-02-17 RX ORDER — AMOXICILLIN 250 MG
2 CAPSULE ORAL 2 TIMES DAILY
Status: DISCONTINUED | OUTPATIENT
Start: 2022-02-17 | End: 2022-02-19 | Stop reason: HOSPADM

## 2022-02-17 RX ORDER — IODIXANOL 320 MG/ML
150 INJECTION, SOLUTION INTRAVASCULAR ONCE
Status: COMPLETED | OUTPATIENT
Start: 2022-02-17 | End: 2022-02-17

## 2022-02-17 RX ORDER — ONDANSETRON 4 MG/1
4 TABLET, ORALLY DISINTEGRATING ORAL EVERY 6 HOURS PRN
Status: DISCONTINUED | OUTPATIENT
Start: 2022-02-17 | End: 2022-02-19 | Stop reason: HOSPADM

## 2022-02-17 RX ORDER — NITROGLYCERIN 0.4 MG/1
0.4 TABLET SUBLINGUAL EVERY 5 MIN PRN
Status: DISCONTINUED | OUTPATIENT
Start: 2022-02-17 | End: 2022-02-19 | Stop reason: HOSPADM

## 2022-02-17 RX ORDER — NALOXONE HYDROCHLORIDE 0.4 MG/ML
0.2 INJECTION, SOLUTION INTRAMUSCULAR; INTRAVENOUS; SUBCUTANEOUS
Status: DISCONTINUED | OUTPATIENT
Start: 2022-02-17 | End: 2022-02-17 | Stop reason: HOSPADM

## 2022-02-17 RX ORDER — ONDANSETRON 2 MG/ML
4 INJECTION INTRAMUSCULAR; INTRAVENOUS
Status: COMPLETED | OUTPATIENT
Start: 2022-02-17 | End: 2022-02-17

## 2022-02-17 RX ORDER — AMOXICILLIN 250 MG
1 CAPSULE ORAL 2 TIMES DAILY
Status: DISCONTINUED | OUTPATIENT
Start: 2022-02-17 | End: 2022-02-19 | Stop reason: HOSPADM

## 2022-02-17 RX ORDER — PANTOPRAZOLE SODIUM 40 MG/1
40 TABLET, DELAYED RELEASE ORAL DAILY
Status: DISCONTINUED | OUTPATIENT
Start: 2022-02-17 | End: 2022-02-19 | Stop reason: HOSPADM

## 2022-02-17 RX ORDER — FLUMAZENIL 0.1 MG/ML
0.2 INJECTION, SOLUTION INTRAVENOUS
Status: DISCONTINUED | OUTPATIENT
Start: 2022-02-17 | End: 2022-02-17 | Stop reason: HOSPADM

## 2022-02-17 RX ORDER — HEPARIN SODIUM 1000 [USP'U]/ML
2500 INJECTION, SOLUTION INTRAVENOUS; SUBCUTANEOUS ONCE
Status: COMPLETED | OUTPATIENT
Start: 2022-02-17 | End: 2022-02-17

## 2022-02-17 RX ORDER — ACETAMINOPHEN 325 MG/1
650 TABLET ORAL EVERY 4 HOURS PRN
Status: DISCONTINUED | OUTPATIENT
Start: 2022-02-17 | End: 2022-02-19 | Stop reason: HOSPADM

## 2022-02-17 RX ORDER — ONDANSETRON 2 MG/ML
4 INJECTION INTRAMUSCULAR; INTRAVENOUS EVERY 6 HOURS PRN
Status: DISCONTINUED | OUTPATIENT
Start: 2022-02-17 | End: 2022-02-19 | Stop reason: HOSPADM

## 2022-02-17 RX ORDER — LIDOCAINE 40 MG/G
CREAM TOPICAL
Status: DISCONTINUED | OUTPATIENT
Start: 2022-02-17 | End: 2022-02-19 | Stop reason: HOSPADM

## 2022-02-17 RX ORDER — HEPARIN SODIUM 1000 [USP'U]/ML
5500 INJECTION, SOLUTION INTRAVENOUS; SUBCUTANEOUS ONCE
Status: COMPLETED | OUTPATIENT
Start: 2022-02-17 | End: 2022-02-17

## 2022-02-17 RX ORDER — ACETAMINOPHEN 650 MG/1
650 SUPPOSITORY RECTAL EVERY 4 HOURS PRN
Status: DISCONTINUED | OUTPATIENT
Start: 2022-02-17 | End: 2022-02-19 | Stop reason: HOSPADM

## 2022-02-17 RX ADMIN — HEPARIN SODIUM 2500 UNITS: 1000 INJECTION INTRAVENOUS; SUBCUTANEOUS at 17:30

## 2022-02-17 RX ADMIN — FENTANYL CITRATE 50 MCG: 50 INJECTION, SOLUTION INTRAMUSCULAR; INTRAVENOUS at 18:50

## 2022-02-17 RX ADMIN — HEPARIN SODIUM 1100 UNITS/HR: 1000 INJECTION INTRAVENOUS; SUBCUTANEOUS at 22:39

## 2022-02-17 RX ADMIN — SODIUM CHLORIDE 1000 ML: 9 INJECTION, SOLUTION INTRAVENOUS at 19:40

## 2022-02-17 RX ADMIN — PANTOPRAZOLE SODIUM 40 MG: 40 TABLET, DELAYED RELEASE ORAL at 08:03

## 2022-02-17 RX ADMIN — IODIXANOL 300 ML: 320 INJECTION, SOLUTION INTRAVASCULAR at 19:19

## 2022-02-17 RX ADMIN — SODIUM CHLORIDE, POTASSIUM CHLORIDE, SODIUM LACTATE AND CALCIUM CHLORIDE 500 ML: 600; 310; 30; 20 INJECTION, SOLUTION INTRAVENOUS at 20:21

## 2022-02-17 RX ADMIN — LIDOCAINE HYDROCHLORIDE 10 ML: 10 INJECTION, SOLUTION EPIDURAL; INFILTRATION; INTRACAUDAL; PERINEURAL at 18:38

## 2022-02-17 RX ADMIN — SENNOSIDES AND DOCUSATE SODIUM 1 TABLET: 50; 8.6 TABLET ORAL at 08:04

## 2022-02-17 RX ADMIN — FLUOXETINE 20 MG: 20 CAPSULE ORAL at 08:04

## 2022-02-17 RX ADMIN — ACETAMINOPHEN 650 MG: 325 TABLET, FILM COATED ORAL at 20:37

## 2022-02-17 RX ADMIN — SODIUM CHLORIDE, POTASSIUM CHLORIDE, SODIUM LACTATE AND CALCIUM CHLORIDE 500 ML: 600; 310; 30; 20 INJECTION, SOLUTION INTRAVENOUS at 21:55

## 2022-02-17 RX ADMIN — HEPARIN SODIUM 5500 UNITS: 1000 INJECTION INTRAVENOUS; SUBCUTANEOUS at 16:25

## 2022-02-17 RX ADMIN — MIDAZOLAM 1 MG: 1 INJECTION INTRAMUSCULAR; INTRAVENOUS at 18:51

## 2022-02-17 RX ADMIN — ONDANSETRON 4 MG: 2 INJECTION INTRAMUSCULAR; INTRAVENOUS at 18:17

## 2022-02-17 RX ADMIN — HEPARIN SODIUM 1500 UNITS/HR: 1000 INJECTION INTRAVENOUS; SUBCUTANEOUS at 04:42

## 2022-02-17 ASSESSMENT — ACTIVITIES OF DAILY LIVING (ADL)
ADLS_ACUITY_SCORE: 4

## 2022-02-17 ASSESSMENT — ANXIETY QUESTIONNAIRES: GAD7 TOTAL SCORE: 0

## 2022-02-17 NOTE — H&P
[facility]    Cardiology History and Physical - Interventional         Date of Admission:  2/16/2022    Assessment & Plan: SL       Nataly Kee is a 71 year old female with PMHx relevant for Obstructive Sleep Apnea, Prior Pulmonary Embolism, Obesity, Major Depressive Disorder , Pure Hypercholesterolemia, GERD, Colonic Tubular Adenomas, Osteopenia and Obesity who presented to the [facility] as a direct transfer from [facility] due to Acute Pulmonary Embolism with features of Right RV strain on both CTA chest and echocardiogram.       # Acute multiple central pulmonary embolism (large burden)     > RV strain evidence on CTA chest and Echocardiogram     > Unprovoked  # History of PE (~20 year ago) post-surgical intervention (Hysterectomy)  # No history of DVT or Hypercoagulable disorder        Patient presented with acute to subacute reports of SOB/CALVILLO (for the past 10 days) and most recently diagnosed with large burden of PE at [facility]. Most recent echocardiogram (02/16/22) showed preserved LV function with EF: 6--65% although significant RV dilation/moderately reduced systolic function with TV regurgitation (+1) and an estimated systolic PA pressure of ~70mmHg. ECG essentially revealed sinus tachycardia with non-specific T wave abnormality (leads V4-V6).  D-Dimer and NT-Pro BNP elevated while Troponin Ix 1 WNL (refer to Epic for values).        Patient does have remote history of what it seems to be provoked PE about twenty years ago following post-surgical complication after a Hysterectomy. Otherwise, she denies any recent viral/bacterial infectious process (including COVID-19 which has been ruled out multiple times by patient's PCP and herself with home test kits). She also denied any recent immobilization, large bone fractures, personal/family history of hypercoagulable disorder, or constitutional symptoms (fever, chills, night sweats,  "involuntary weight loss) to  suspect innocuous malignancy.          Despite her overall clinical stability, patient will likely need mechanical thrombectomy vs. tPA given her current echocardiogram findings.     - Admit to Cardiology with telemetry  - Volume status: Euvolemic  - Continue heparin Infusion (high intensity PE protocol)     > RN driven  - NPO at [place]   - Pending potential Mechanical Thrombectomy in the AM of 02/17/22 as per ED     providers discussion with Dr. Garcia  - SpO2 goal >92% (provide oxygen as needed)  - Venous Doppler US ordered (bilateral lower extremities)  - CBC in the AM    - ECG PRN if chest pain evolves    # Elevated TSH    > Subclinical Hypothyroidism vs. Non-thyroidal acute illness      - Defer to outpatient/PCP follow-up and treatment in light of acute illness  - NTD    # Pure Hypercholesterolemia     > Currently on no lipid-lowering medication        Patient had recently discussed almost a 20lbs increment due to \"dirty\" diet. She has accorded with her PCP to pursue dietary and lifestyle modifications to later evaluate the possibility of pharmacotherapy as needed.    - Defer to outpatient/PCP   -NTD       Diet: NPO for medical reasons  DVT Prophylaxis: Heparin gtt  [place] Catheter: Not present  Code Status: Full Code  Lines: PIV         Disposition Plan   Expected discharge: 2 - 3 days, recommended to prior living arrangement once fluid volume status optimized on oral medication and O2 use less than 1 liters/minute.    Entered: Ty Deluca MD 02/16/2022, 11:04 PM    Ty Deluca  Internal Medicine Residency, PGY-3  [facility]   p. [phone]        [facility]    ______________________________________________________________________    Chief Complaint   Pulmonary Embolism with RV strain    History is obtained from the patient and EMR    History of Present Illness      Nataly Kee is a 71 " year old female with PMHx relevant for Obstructive Sleep Apnea, Prior Pulmonary Embolism, Obesity, Major Depressive Disorder , Pure Hypercholesterolemia, GERD, Colonic Tubular Adenomas, Osteopenia and Obesity who presented to the [facility] as a direct transfer from [facility] due to Acute Pulmonary Embolism with features of Right RV strain on both CTA chest and echocardiogram.        Patient initially presented to her Family Practitioner (Latesha Hilario NP) at the [facility] practice for regular follow-up.  She had reported a 10 day history of shortness of breath and dyspnea on exertion with subjective fevers. At the time, FP NP recommended ordering a D-Dimer and, if elevated, to purue further diagnostic work-up/evaluation at the ED. D-Dimer came back as 3.94 so patient went to [facility] ED.        At [facility], patient presented hypertensive (s29//100), afebrile, non tachycardiac (pulse 85-97bpm) and with normal SpO2's at RA (>94%). Two ECG's were performed and revealed Sinus tachycardia with occasional PVC's and Sinus rhythm, respectively. Laboratory work-up was relevant for normal CBC/CMP, elevated NT-Pro BNP at 1,497, elevated TSH at 4.67 (with normal free T4) and Troponin I x1 WNL.  CXR 2 views with small hiatal hernia, otherwise no acute cardiopulmonary issues. Chest CTA with PE protocol showing acute pulmonary embolism (large burden) and evidence of elevated RV pressures. Echocardiogram showed preserved LV systolic function, EF: 60-65%, moderate RV dilation, moderately reduced RV systolic function with mild TV regurgitation (+1) and estimated PA systolic pressure at 70 mmHg.         Case was discussed with both Dr. Garcia (Interventional Radiology) and Dr. Casas (Cardiology) which agreed patient panfilo benefit from Mechanical Thrombectomy.        Upon arrival to the [facility], patient remains on high intensity continuous Heparin infusion, afebrile, normotensive,  non-tachycardic, non-tachypneic, and with SpO2's above 90% (currently on 3LPM). No signs of clinical deterioration at the time.          Upon further questioning she denies currently taking any blood thinners and denies any lightheadedness, dizziness, visual disturbances, chest pain, palpitations, syncopal events, mechanical falls, abnormal bleeding, skin rashes, recent cold/catharral like symptoms or recent viral infection (including COVID-19), urinary symptoms, left lower extremity swelling, involuntary weight loss, recently diagnosed malignancy  or any other features of concern.           Patient is rather fairly active and healthy. Reports performing resistance/endurance training (wieght lifting/cardio) at least twice a week and reports overall good cardiovascular fitness.         Admitted to the Cardiology CSI service for considerations for IR Mechanical Thrombectomy later in the AM of 02/16/22.     Review of Systems    The 10 point Review of Systems is negative other than noted in the HPI or here.     Past Medical History    I have reviewed this patient's medical history and updated it with pertinent information if needed.   Past Medical History:   Diagnosis Date     Depression      Depressive disorder      GERD (gastroesophageal reflux disease)        Past Surgical History   I have reviewed this patient's surgical history and updated it with pertinent information if needed.  Past Surgical History:   Procedure Laterality Date     COLONOSCOPY  04/01/2015    tubular adenoma     HC REMOVAL OF TONSILS,12+ Y/O      age 20     ZZC LIGATE FALLOPIAN TUBE  01/01/1983     ZZC VAGINAL HYSTERECTOMY  01/01/2001    Hysterectomy, Vaginal - fibroids     ZZ COLONOSCOPY THRU STOMA, DIAGNOSTIC  08/01/2010    3 mm polyp in descending colon/ Dr Asencio       Social History   I have reviewed this patient's social history and updated it with pertinent information if needed.  Social History     Tobacco Use     Smoking status: Former  Smoker     Quit date: 1975     Years since quittin.1     Smokeless tobacco: Never Used   Substance Use Topics     Alcohol use: Yes     Alcohol/week: 0.0 standard drinks     Comment: very occa     Drug use: No     Family History   I have reviewed this patient's family history and updated it with pertinent information if needed.   I have reviewed this patient's family history and updated it with pertinent information if needed.  Family History   Problem Relation Age of Onset     Coronary Artery Disease Mother      Heart Disease Mother         related to chemotherapy treatment; pacemaker     Cancer Mother         hodgekins disease     Cerebrovascular Disease Mother      Depression Mother      Coronary Artery Disease Father      Respiratory Father         emphysema     Heart Disease Father         abdominal aneurysm     Multiple Sclerosis Sister         relapsing praveen.      Hypertension Sister      Other - See Comments Sister         feet issues     Bipolar Disorder Daughter      Colon Cancer No family hx of        Prior to Admission Medications   Prior to Admission Medications   Prescriptions Last Dose Informant Patient Reported? Taking?   Cholecalciferol (VITAMIN D) 125 MCG (5000 UT) capsule   Yes No   Sig: Take 1 capsule by mouth daily 2000 international unit(s)   FLUoxetine (PROZAC) 20 MG capsule   No No   Sig: TAKE 1 CAPSULE BY MOUTH EVERY DAY   omeprazole (PRILOSEC) 20 MG DR capsule   No No   Sig: Take 1 capsule (20 mg) by mouth daily      Facility-Administered Medications: None     Allergies   Allergies   Allergen Reactions     No Known Drug Allergies        Physical Exam   Vital Signs:                    Weight: 0 lbs 0 oz    Constitutional: awake, alert, cooperative, no apparent distress, and appears stated age  Eyes: Lids and lashes normal, pupils equal, round and reactive to light, extra ocular muscles intact, sclera clear, conjunctiva normal  ENT: Normocephalic, without obvious abnormality,  atraumatic, sinuses nontender on palpation, external ears without lesions, oral pharynx with moist mucous membranes, tonsils without erythema or exudates, gums normal and good dentition.  Respiratory: No increased work of breathing, good air exchange, clear to auscultation bilaterally, no crackles or wheezing  Cardiovascular: Normal apical impulse, regular rate and rhythm, normal S1 and S2, no S3 or S4, and no murmur noted  GI: No scars, normal bowel sounds, soft, non-distended, non-tender, no masses palpated, no hepatosplenomegally  Skin: no bruising or bleeding, normal skin color, texture, turgor, no redness, warmth, or swelling, no rashes, no lesions and no jaundice  Musculoskeletal: There is no redness, warmth, or swelling of the joints.  Full range of motion noted.  Motor strength is 5 out of 5 all extremities bilaterally.  Tone is normal.  Neurologic: Awake, alert, oriented to name, place and time.  Cranial nerves II-XII are grossly intact.  Motor is 5 out of 5 bilaterally. Sensory is intact. .    Data   Data reviewed today: I reviewed all medications, new labs and imaging results over the last 24 hours.     Results for orders placed or performed during the hospital encounter of 02/16/22 (from the past 24 hour(s))   [place] Draw    Narrative    The following orders were created for panel order [place] Draw.  Procedure                               Abnormality         Status                     ---------                               -----------         ------                     Extra Blue Top Tube[[id number]]                              Final result               Extra Red Top Tube[[id number]]                               Final result               Extra Green Top (Lithium...[[id number]]                      Final result               Extra Purple Top Tube[[id number]]                            Final result                 Please view results for these tests on the individual orders.   Extra Blue Top Tube    Result Value Ref Range    Hold Specimen JIC    Extra Red Top Tube   Result Value Ref Range    Hold Specimen JIC    Extra Green Top (Lithium Heparin) Tube   Result Value Ref Range    Hold Specimen JIC    Extra Purple Top Tube   Result Value Ref Range    Hold Specimen JIC    CBC with platelets differential    Narrative    The following orders were created for panel order CBC with platelets differential.  Procedure                               Abnormality         Status                     ---------                               -----------         ------                     CBC with platelets and d...[[id number]]                      Final result                 Please view results for these tests on the individual orders.   Basic metabolic panel   Result Value Ref Range    Sodium 140 133 - 144 mmol/L    Potassium 3.8 3.4 - 5.3 mmol/L    Chloride 110 (H) 94 - 109 mmol/L    Carbon Dioxide (CO2) 24 20 - 32 mmol/L    Anion Gap 6 3 - 14 mmol/L    Urea Nitrogen 14 7 - 30 mg/dL    Creatinine 1.03 0.52 - 1.04 mg/dL    Calcium 9.3 8.5 - 10.1 mg/dL    Glucose 113 (H) 70 - 99 mg/dL    GFR Estimate 57 (L) >60 mL/min/1.73m2   Troponin I   Result Value Ref Range    Troponin I High Sensitivity 18 <54 ng/L   Nt probnp inpatient (BNP)   Result Value Ref Range    N terminal Pro BNP Inpatient 1,497 (H) 0 - 900 pg/mL   CBC with platelets and differential   Result Value Ref Range    WBC Count 8.9 4.0 - 11.0 10e3/uL    RBC Count 5.11 3.80 - 5.20 10e6/uL    Hemoglobin 14.8 11.7 - 15.7 g/dL    Hematocrit 46.3 35.0 - 47.0 %    MCV 91 78 - 100 fL    MCH 29.0 26.5 - 33.0 pg    MCHC 32.0 31.5 - 36.5 g/dL    RDW 13.8 10.0 - 15.0 %    Platelet Count 305 150 - 450 10e3/uL    % Neutrophils 69 %    % Lymphocytes 23 %    % Monocytes 6 %    % Eosinophils 2 %    % Basophils 0 %    % Immature Granulocytes 0 %    NRBCs per 100 WBC 0 <1 /100    Absolute Neutrophils 6.1 1.6 - 8.3 10e3/uL    Absolute Lymphocytes 2.0 0.8 - 5.3 10e3/uL    Absolute Monocytes  0.6 0.0 - 1.3 10e3/uL    Absolute Eosinophils 0.1 0.0 - 0.7 10e3/uL    Absolute Basophils 0.0 0.0 - 0.2 10e3/uL    Absolute Immature Granulocytes 0.0 <=0.4 10e3/uL    Absolute NRBCs 0.0 10e3/uL   TSH with free T4 reflex   Result Value Ref Range    TSH 4.67 (H) 0.40 - 4.00 mU/L   Magnesium   Result Value Ref Range    Magnesium 2.1 1.6 - 2.3 mg/dL   T4 free   Result Value Ref Range    Free T4 1.00 0.76 - 1.46 ng/dL   EKG 12-lead, tracing only   Result Value Ref Range    Systolic Blood Pressure  mmHg    Diastolic Blood Pressure  mmHg    Ventricular Rate 132 BPM    Atrial Rate 132 BPM    NY Interval 124 ms    QRS Duration 74 ms     ms    QTc 545 ms    P Axis  degrees    R AXIS 19 degrees    T Axis -39 degrees    Interpretation ECG       Sinus tachycardia with occasional Premature ventricular complexes  Low voltage QRS  Nonspecific T wave abnormality  Abnormal ECG  When compared with ECG of 03-OCT-2001 11:35,  Previous ECG has undetermined rhythm, needs review  T wave inversion now evident in Inferior leads     EKG 12 lead   Result Value Ref Range    Systolic Blood Pressure  mmHg    Diastolic Blood Pressure  mmHg    Ventricular Rate 85 BPM    Atrial Rate 85 BPM    NY Interval 124 ms    QRS Duration 72 ms     ms    QTc 447 ms    P Axis  degrees    R AXIS 15 degrees    T Axis 2 degrees    Interpretation ECG       Sinus rhythm  Normal ECG  When compared with ECG of 16-FEB-2022 15:22, (unconfirmed)  Premature ventricular complexes are no longer Present  Vent. rate has decreased BY  47 BPM  T wave inversion no longer evident in Inferior leads     CT Chest Pulmonary Embolism w Contrast    Narrative    EXAM: CT CHEST PULMONARY EMBOLISM W CONTRAST  LOCATION: [facility]  DATE/TIME: 2/16/2022 5:05 PM    INDICATION: PE suspected, low/intermediate prob, positive D-dimer  COMPARISON: None.  TECHNIQUE: CT chest pulmonary angiogram during arterial phase injection of IV contrast. Multiplanar reformats  and MIP reconstructions were performed. Dose reduction techniques were used.   CONTRAST: 63mL Isovue 370    FINDINGS:  ANGIOGRAM CHEST: Multiple pulmonary emboli are present bilaterally beginning centrally in the distal right and left pulmonary arteries and extending into all lobar and proximal segmental branches. Pulmonary arteries are mildly dilated. Thoracic aorta   normal in caliber. No aortic dissection or other acute abnormality.    HEART: Right atrium and ventricle are mildly dilated. No pericardial effusion mild coronary artery calcification.    MEDIASTINUM: No adenopathy or mass. Large hiatal hernia.    LUNGS AND PLEURA: No pulmonary mass, consolidation, or suspicious pulmonary nodule. No pleural effusion or pneumothorax.    LIMITED UPPER ABDOMEN: Negative.    MUSCULOSKELETAL: Negative.      Impression    IMPRESSION:  1.  Acute pulmonary embolism is present, large embolic burden beginning centrally with evidence for elevated right heart pressure.  2.  Results were discussed with Dr. Cabral at 5:30 PM 2022.   Echocardiogram Complete   Result Value Ref Range    LVEF  60-65%     Narrative    [id number]  [id number]  [id number]  [id number]^MISHA^ANTONINO^DEVONTE                                                                       Version 2     [facility]  Echocardiography Laboratory  [address] [place] [address]     Name: JENNIFER RIOS  MRN: [id number]  : 1950  Study Date: 2022 06:58 PM  Age: 71 yrs  Gender: Female  Patient Location: [facility]  Reason For Study: Pulmonary Embolism  Ordering Physician: ANTONINO CABRAL  Referring Physician: Latesha Hilario  Performed By: Wilian Martinez RDCS     BSA: 1.9 m2  Height: 63 in  Weight: 184 lb  HR: 85  BP: 143/86 mmHg  ______________________________________________________________________________  Procedure  Complete Echo Adult.  ______________________________________________________________________________  Interpretation  Summary     Left ventricular systolic function is normal. The visual ejection fraction is  60-65%. No regional wall motion abnormalities noted.  The right ventricle is moderately dilated. The right ventricular systolic  function is mild to moderately reduced.  There is mild (1+) tricuspid regurgitation.  Pulmonary artery systolic pressure is severely elevated at at ~70 mmHg.  No prior study for comparison.  Results disscussed with Dr. Call at 7:50 pm.  ______________________________________________________________________________  Left Ventricle  The left ventricle is normal in size. There is normal left ventricular wall  thickness. Left ventricular systolic function is normal. The visual ejection  fraction is 60-65%. Grade I or early diastolic dysfunction. No regional wall  motion abnormalities noted.     Right Ventricle  The right ventricle is moderately dilated. The right ventricular systolic  function is mild to moderately reduced.     Atria  Normal left atrial size. The right atrium is mildly dilated.     Mitral Valve  The mitral valve leaflets appear normal. There is no evidence of stenosis,  fluttering, or prolapse. There is trace mitral regurgitation.     Tricuspid Valve  Normal tricuspid valve. There is mild (1+) tricuspid regurgitation. The right  ventricular systolic pressure is approximated at 71.4 mmHg plus the right  atrial pressure.     Aortic Valve  Normal tricuspid aortic valve. No aortic stenosis is present.     Pulmonic Valve  The pulmonic valve is not well visualized. There is trace pulmonic valvular  regurgitation.     Vessels  Normal size aorta. Normal size ascending aorta. IVC diameter <2.1 cm  collapsing >50% with sniff suggests a normal RA pressure of 3 mmHg.     Pericardium  Trivial pericardial effusion.     Rhythm  Sinus rhythm was noted.  ______________________________________________________________________________  MMode/2D Measurements & Calculations  IVSd: 0.94 cm     LVIDd: 4.3  cm  LVIDs: 2.4 cm  LVPWd: 0.72 cm  FS: 44.1 %  LV mass(C)d: 110.3 grams  LV mass(C)dI: 59.1 grams/m2  Ao root diam: 3.6 cm  LA dimension: 2.9 cm  asc Aorta Diam: 3.5 cm  LA/Ao: 0.81  LVOT diam: 2.1 cm  LVOT area: 3.5 cm2  LA Volume (BP): 49.3 ml  LA Volume Index (BP): 26.4 ml/m2  RWT: 0.33     Doppler Measurements & Calculations  MV E max karen: 55.3 cm/sec  MV A max karen: 75.7 cm/sec  MV E/A: 0.73  MV max P.8 mmHg  MV mean P.0 mmHg  MV V2 VTI: 19.7 cm  MV dec time: 0.15 sec  PA acc time: 0.07 sec  TR max karen: 422.5 cm/sec  TR max P.4 mmHg  E/E' av.0  Lateral E/e': 4.9  Medial E/e': 9.2     ______________________________________________________________________________  Report approved by: Roderick Ren 2022 07:50 PM                 I have seen and examined the patient and agree with the finding and plan.       Jose Thompson MD  Cardiology-[facility]  [phone]

## 2022-02-17 NOTE — PLAN OF CARE
Admission          2/16/2022 10:54 PM  -----------------------------------------------------------  Diagnosis: PE with features of R heart strain    Admitted from: [facility]  Report given from: Donita ED RN  Via: Phone  Family Aware of Admission: Yes  Belongings: Purse: cell phone, wallet, book, , glasses  Admission Profile: Complete  Teaching: Orientation to unit, call don't fall, use of call light, meal times, visiting hours,  when to call for the RN (angina/sob/dizzyness, etc.), and enforced importance of safety.  Access: L PIV  Telemetry: Placed on pt  Ht./Wt.: Complete  2 RN skin assessment: With Barbara BANEGAS RN  Benign (per patient report) cyst on lower back. No other notable skin issues    Temp:  [96.8  F (36  C)-98.1  F (36.7  C)] 97.7  F (36.5  C)  Pulse:  [83-97] 85  Resp:  [30] 30  BP: (108-157)/() 138/91  SpO2:  [90 %-98 %] 93 %

## 2022-02-17 NOTE — PLAN OF CARE
D: PE with features of R heart strain    PMHx relevant for Obstructive Sleep Apnea, Prior Pulmonary Embolism, Obesity, Major Depressive Disorder , Pure Hypercholesterolemia, GERD, Colonic Tubular Adenomas, Osteopenia and Obesity     I: Monitored vitals and assessed pt status. Denies pain, numbness or tingling, and skin is unremarkable. Pt sis complain of stomach cramping and thinks it may be related to constipation.     Changed: NPO for procedure @ 0000, Heparin bag @ 4:45. Held heparin q1 hour and decreased 100u/hr      Running: Heparin 1400 units/ hr      A: A&Ox4. VSS on 1L NC. Tele shows SR- ST Afebrile. Urinating adequately.      P: Continue to monitor pt status and report changes to CSI treatment team. Plan for mechanical thrombectomy 1/17.    [id number]

## 2022-02-17 NOTE — PROGRESS NOTES
[facility]    Cardiology History and Physical - Interventional  Date of Admission:  2/16/2022    Assessment & Plan: S       Nataly Kee is a 71 year old female with PMHx relevant for Obstructive Sleep Apnea, Prior Pulmonary Embolism, Obesity, Major Depressive Disorder , Pure Hypercholesterolemia, GERD, Colonic Tubular Adenomas, Osteopenia and Obesity who presented to the [facility] as a direct transfer from [facility] due to Acute Pulmonary Embolism with features of Right RV strain on both CTA chest and echocardiogram. S/p thrombectomy 2/17/22.    # Acute multiple central pulmonary embolism (large burden)     > RV strain evidence on CTA chest and Echocardiogram     > Unprovoked  # History of PE (~20 year ago) post-surgical intervention (Hysterectomy)  # No history of DVT or Hypercoagulable disorder  - Presentation: Acute to subacute reports of SOB/CALVILLO (for the past 10 days) and diagnosed with large burden of PE at [facility].   - Echo (02/16/22): LVEF 60-65% although significant RV dilation/moderately reduced systolic function with TV regurgitation (+1) and a PASP ~70mmHg. ECG essentially revealed sinus tachycardia with non-specific T wave abnormality (leads V4-V6).    D-Dimer and NT-Pro BNP elevated while Troponin Ix 1 WNL.  - Provoked PE 20 yrs ago following hysterectomy  - s/p mechanical thrombectomy 2/17/22     - Continue heparin Infusion (high intensity PE protocol). DOAC home prescription cost evaluation sent.  - Venous Doppler US: negative    # Elevated TSH - Likely subclinical Hypothyroidism  #HLD     Diet: NPO for medical reasons  DVT Prophylaxis: Heparin gtt  [place] Catheter: Not present  Code Status: Full Code  Lines: PIV     Disposition Plan   Expected discharge: 2 - 3 days, recommended to prior living arrangement once fluid volume status optimized on oral medication and O2 use less than 1 liters/minute.    Entered: Mohsan Chaudhry,  MD 02/17/2022, 4:37 PM    Mohsan Chaudhry MD  Cardiology Fellow    [facility]    _____________________________________________________________________  SUBJECTIVE:  No acute events overnight. HD stable. Undergoing thrombectomy.  Reports she has had several weeks of SOB, which is currently improved.    Physical Exam   Vital Signs: Temp: 98.4  F (36.9  C) Temp src: Oral BP: (!) 148/92 Pulse: 71   Resp: 16 SpO2: 97 % O2 Device: Nasal cannula Oxygen Delivery: 10 LPM  Weight: 182 lbs 14.4 oz    Constitutional: awake, alert, cooperative, no apparent distress, and appears stated age  Eyes: Lids and lashes normal, pupils equal, round and reactive to light, extra ocular muscles intact, sclera clear, conjunctiva normal  ENT: Normocephalic, without obvious abnormality, atraumatic, sinuses nontender on palpation, external ears without lesions, oral pharynx with moist mucous membranes, tonsils without erythema or exudates, gums normal and good dentition.  Respiratory: No increased work of breathing, good air exchange, clear to auscultation bilaterally, no crackles or wheezing  Cardiovascular: Normal apical impulse, regular rate and rhythm, normal S1 and S2, no S3 or S4, and no murmur noted  GI: No scars, normal bowel sounds, soft, non-distended, non-tender, no masses palpated, no hepatosplenomegally  Skin: no bruising or bleeding, normal skin color, texture, turgor, no redness, warmth, or swelling, no rashes, no lesions and no jaundice.  Musculoskeletal: There is no redness, warmth, or swelling of the joints.  Full range of motion noted.  Motor strength is 5 out of 5 all extremities bilaterally.  Tone is normal.  Neurologic: Awake, alert, oriented to name, place and time.  Cranial nerves II-XII are grossly intact.  Motor is 5 out of 5 bilaterally. Sensory is intact. .    Data   Data reviewed today: I reviewed all medications, new labs and imaging results over the last 24 hours.      Results for orders placed or performed during the hospital encounter of 02/16/22 (from the past 24 hour(s))   CBC with platelets   Result Value Ref Range    WBC Count 8.0 4.0 - 11.0 10e3/uL    RBC Count 5.00 3.80 - 5.20 10e6/uL    Hemoglobin 14.2 11.7 - 15.7 g/dL    Hematocrit 44.1 35.0 - 47.0 %    MCV 88 78 - 100 fL    MCH 28.4 26.5 - 33.0 pg    MCHC 32.2 31.5 - 36.5 g/dL    RDW 14.2 10.0 - 15.0 %    Platelet Count 305 150 - 450 10e3/uL   Extra Tube    Narrative    The following orders were created for panel order Extra Tube.  Procedure                               Abnormality         Status                     ---------                               -----------         ------                     Extra Green Top (Lithium...[[id number]]                      Final result                 Please view results for these tests on the individual orders.   Extra Green Top (Lithium Heparin) Tube   Result Value Ref Range    Hold Specimen [facility]    Interventional Radiology Adult/Peds IP Consult: Patient to be seen: Routine within 24 hours; Call back #: [id number]; Acute large pulmonary burden, case discussed with Dr. Garcia for mechanical thrombectomy in the AM; Requesting provider? Attending physi...    Kd Prather PA-C     2/17/2022  7:25 AM  INTERVENTIONAL RADIOLOGY CONSULT    Nataly Kee is a 71 year old female with obesity, sleep apnea,   recent COVID transferred from [facility] with acute pulmonary   embolism with right heart strain. Troponin normal, BNP and   D-dimer elevated.    CT Chest PE 2/16/22 5:05 PM  Multiple pulmonary emboli are present bilaterally beginning   centrally in the distal right and left pulmonary arteries and   extending into all lobar and proximal segmental branches.   Pulmonary arteries are mildly dilated.     Limited echo 2/16/22 7:50 PM  The right ventricle is moderately dilated. The right ventricular   systolic function is mild to moderately reduced.        Patient is on IR  "schedule today 2/17/2022 for a pulmonary   angiogram and thrombectomy. Keep NPO. Consent will be done prior   to procedure.     Discussed with cardiology.    BP (!) 118/92 (BP Location: Right arm, Cuff Size: Adult Regular)     Pulse 86   Temp 97.9  F (36.6  C) (Oral)   Resp 18   Ht   1.626 m (5' 4\")   Wt 83 kg (182 lb 14.4 oz)   LMP 09/03/2001     SpO2 93%   BMI 31.39 kg/m       Kevin Brooke PA-C  Interventional Radiology  [phone] (IR pager)     Troponin I   Result Value Ref Range    Troponin I High Sensitivity 14 <54 ng/L   INR   Result Value Ref Range    INR 1.13 0.85 - 1.15   Asymptomatic COVID-19 Virus (Coronavirus) by PCR Nasopharyngeal    Specimen: Nasopharyngeal; Swab   Result Value Ref Range    SARS CoV2 PCR Negative Negative, Testing sent to reference lab. Results will be returned via unsolicited result    Narrative    Testing was performed using the Xpert Xpress SARS-CoV-2 Assay on the  [employer]. Additional information about  this Emergency Use Authorization (EUA) assay can be found via the Lab  Guide. This test should be ordered for the detection of SARS-CoV-2 in  individuals who meet SARS-CoV-2 clinical and/or epidemiological  criteria. Test performance is unknown in asymptomatic patients. This  test is for in vitro diagnostic use under the FDA EUA for  laboratories certified under CLIA to perform high complexity testing.  This test has not been FDA cleared or approved. A negative result  does not rule out the presence of PCR inhibitors in the specimen or  target RNA in concentration below the limit of detection for the  assay. The possibility of a false negative should be considered if  the patient's recent exposure or clinical presentation suggests  COVID-19. This test was validated by the [facility]. This laboratory is certified under  the Clinical Laboratory Improvement Amendments of 1988 (CLIA-88) " as  qualified to perform high complexity laboratory testing.     Heparin Unfractionated Anti Xa Level   Result Value Ref Range    Anti Xa Unfractionated Heparin 0.72 For Reference Range, See Comment IU/mL    Narrative    Therapeutic Range: UFH: 0.25-0.50 IU/mL for low intensity dosing,  0.30-0.70 IU/mL for high intensity dosing DVT and PE.  This test is not validated for other direct factor X inhibitors (e.g. rivaroxaban, apixaban, edoxaban, betrixaban, fondaparinux) and should not be used for monitoring of other medications.   CBC with platelets   Result Value Ref Range    WBC Count 7.6 4.0 - 11.0 10e3/uL    RBC Count 4.71 3.80 - 5.20 10e6/uL    Hemoglobin 13.3 11.7 - 15.7 g/dL    Hematocrit 42.4 35.0 - 47.0 %    MCV 90 78 - 100 fL    MCH 28.2 26.5 - 33.0 pg    MCHC 31.4 (L) 31.5 - 36.5 g/dL    RDW 14.0 10.0 - 15.0 %    Platelet Count 278 150 - 450 10e3/uL   Comprehensive metabolic panel   Result Value Ref Range    Sodium 142 133 - 144 mmol/L    Potassium 4.2 3.4 - 5.3 mmol/L    Chloride 112 (H) 94 - 109 mmol/L    Carbon Dioxide (CO2) 22 20 - 32 mmol/L    Anion Gap 8 3 - 14 mmol/L    Urea Nitrogen 13 7 - 30 mg/dL    Creatinine 0.90 0.52 - 1.04 mg/dL    Calcium 9.1 8.5 - 10.1 mg/dL    Glucose 104 (H) 70 - 99 mg/dL    Alkaline Phosphatase 82 40 - 150 U/L    AST 16 0 - 45 U/L    ALT 12 0 - 50 U/L    Protein Total 6.6 (L) 6.8 - 8.8 g/dL    Albumin 3.0 (L) 3.4 - 5.0 g/dL    Bilirubin Total 0.7 0.2 - 1.3 mg/dL    GFR Estimate 68 >60 mL/min/1.73m2   Heparin Unfractionated Anti Xa Level   Result Value Ref Range    Anti Xa Unfractionated Heparin 0.71 For Reference Range, See Comment IU/mL    Narrative    Therapeutic Range: UFH: 0.25-0.50 IU/mL for low intensity dosing,  0.30-0.70 IU/mL for high intensity dosing DVT and PE.  This test is not validated for other direct factor X inhibitors (e.g. rivaroxaban, apixaban, edoxaban, betrixaban, fondaparinux) and should not be used for monitoring of other medications.    Lower  Extremity Venous Duplex Bilateral    Narrative    EXAMINATION: DOPPLER VENOUS ULTRASOUND OF BILATERAL LOWER EXTREMITIES,  2/17/2022 10:31 AM     INDICATION: Acute pulmonary embolism. Rule out DVT.    COMPARISON: None.    TECHNIQUE:  Gray-scale evaluation with compression, spectral flow and  color Doppler assessment of the deep venous system of both legs from  groin to knee, and then at the ankles.     FINDINGS:  In both lower extremities, the common femoral, femoral, popliteal and  posterior tibial veins demonstrate normal compressibility and blood  flow.      Impression    IMPRESSION:  1.  No evidence of deep venous thrombosis in either lower extremity.    I have personally reviewed the examination and initial interpretation  and I agree with the findings.    EMBER SCHUMACHER MD         SYSTEM ID:  [id number]   Heparin Unfractionated Anti Xa Level   Result Value Ref Range    Anti Xa Unfractionated Heparin 0.38 For Reference Range, See Comment IU/mL    Narrative    Therapeutic Range: UFH: 0.25-0.50 IU/mL for low intensity dosing,  0.30-0.70 IU/mL for high intensity dosing DVT and PE.  This test is not validated for other direct factor X inhibitors (e.g. rivaroxaban, apixaban, edoxaban, betrixaban, fondaparinux) and should not be used for monitoring of other medications.         I have seen and examined the patient and agree with the finding and plan.       Jose Thompson MD  Cardiology-[facility]  [phone]

## 2022-02-17 NOTE — PROGRESS NOTES
D: Pt is a 71 year old with a history of obesity, sleep apnea, and recent COVID. Pt presented from OSH with acute pulmonary embolism and right heart strain. Pt to undergo thrombectomy this shift.    I: Monitored vitals and assessed pt status.     A: Alert and oriented x 4. Pt in sinus rhythm with short runs of tachycardia that subside with rest and deep breaths. Pt went to IR around 1500.    P: Settle pt upon return from IR. Report given to night shift RN.    Gaby Quarles RN

## 2022-02-17 NOTE — CONSULTS
"INTERVENTIONAL RADIOLOGY CONSULT    Nataly Kee is a 71 year old female with obesity, sleep apnea, recent COVID transferred from [facility] with acute pulmonary embolism with right heart strain. Troponin normal, BNP and D-dimer elevated.    CT Chest PE 2/16/22 5:05 PM  Multiple pulmonary emboli are present bilaterally beginning centrally in the distal right and left pulmonary arteries and extending into all lobar and proximal segmental branches. Pulmonary arteries are mildly dilated.     Limited echo 2/16/22 7:50 PM  The right ventricle is moderately dilated. The right ventricular systolic function is mild to moderately reduced.        Patient is on IR schedule today 2/17/2022 for a pulmonary angiogram and thrombectomy. Keep NPO. Consent will be done prior to procedure.     Discussed with cardiology.    BP (!) 118/92 (BP Location: Right arm, Cuff Size: Adult Regular)   Pulse 86   Temp 97.9  F (36.6  C) (Oral)   Resp 18   Ht 1.626 m (5' 4\")   Wt 83 kg (182 lb 14.4 oz)   LMP 09/03/2001   SpO2 93%   BMI 31.39 kg/m       Kevin Brooke PA-C  Interventional Radiology  [phone] (IR pager)    "

## 2022-02-17 NOTE — PRE-PROCEDURE
GENERAL PRE-PROCEDURE:   Procedure:  Pulmonary angiogram with mechanical thrombectomy. possible catheter directed lysis  Date/Time:  2/17/2022 3:13 PM    Verbal consent obtained?: Yes    Written consent obtained?: Yes    Risks and benefits: Risks, benefits and alternatives were discussed    Consent given by:  Patient  Patient states understanding of procedure being performed: Yes    Patient's understanding of procedure matches consent: Yes    Procedure consent matches procedure scheduled: Yes    Expected level of sedation:  Moderate  Appropriately NPO:  Yes  ASA Class:  3  Lungs:  Lungs clear with good breath sounds bilaterally  Heart:  Normal heart sounds and rate  History & Physical reviewed:  History and physical reviewed and no updates needed  Statement of review:  I have reviewed the lab findings, diagnostic data, medications, and the plan for sedation

## 2022-02-17 NOTE — PROGRESS NOTES
Patient Name: Nataly Kee  Medical Record Number: [id number]  Today's Date: 2/17/2022    Procedure: Pulmonary angiogram with Mechanical thrombectomy  Proceduralist: Dr. Craft and Dr. Polk  Pathology present: n/a    Procedure Start: 1530  Procedure end: 1900  Sedation medications administered: 50 mcg fentanyl and 1 mg versed     Report given to: Gaby RIGGS  : n/a    Other Notes: Pt arrived to IR room 1 from . Consent reviewed. Pt denies any questions or concerns regarding procedure. Pt positioned supine and monitored per protocol. At 1900, blood pressure read 60's/40's - 1 liter NS bolus given. Blood pressure increased to 117/75. MD aware. Pt transferred back to .

## 2022-02-18 LAB
ALBUMIN SERPL-MCNC: 2.2 G/DL (ref 3.4–5)
ALP SERPL-CCNC: 61 U/L (ref 40–150)
ALT SERPL W P-5'-P-CCNC: 12 U/L (ref 0–50)
ANION GAP SERPL CALCULATED.3IONS-SCNC: 6 MMOL/L (ref 3–14)
AST SERPL W P-5'-P-CCNC: 12 U/L (ref 0–45)
BILIRUB SERPL-MCNC: 0.5 MG/DL (ref 0.2–1.3)
BUN SERPL-MCNC: 12 MG/DL (ref 7–30)
CALCIUM SERPL-MCNC: 8.4 MG/DL (ref 8.5–10.1)
CHLORIDE BLD-SCNC: 112 MMOL/L (ref 94–109)
CO2 SERPL-SCNC: 22 MMOL/L (ref 20–32)
CREAT SERPL-MCNC: 0.82 MG/DL (ref 0.52–1.04)
ERYTHROCYTE [DISTWIDTH] IN BLOOD BY AUTOMATED COUNT: 14.1 % (ref 10–15)
GFR SERPL CREATININE-BSD FRML MDRD: 76 ML/MIN/1.73M2
GLUCOSE BLD-MCNC: 100 MG/DL (ref 70–99)
HCT VFR BLD AUTO: 31.3 % (ref 35–47)
HGB BLD-MCNC: 10.4 G/DL (ref 11.7–15.7)
HGB BLD-MCNC: 9.7 G/DL (ref 11.7–15.7)
MCH RBC QN AUTO: 28.5 PG (ref 26.5–33)
MCHC RBC AUTO-ENTMCNC: 31 G/DL (ref 31.5–36.5)
MCV RBC AUTO: 92 FL (ref 78–100)
PLATELET # BLD AUTO: 206 10E3/UL (ref 150–450)
POTASSIUM BLD-SCNC: 4.2 MMOL/L (ref 3.4–5.3)
PROT SERPL-MCNC: 5.2 G/DL (ref 6.8–8.8)
RBC # BLD AUTO: 3.4 10E6/UL (ref 3.8–5.2)
SODIUM SERPL-SCNC: 140 MMOL/L (ref 133–144)
UFH PPP CHRO-ACNC: 0.25 IU/ML
UFH PPP CHRO-ACNC: 0.36 IU/ML
WBC # BLD AUTO: 8.6 10E3/UL (ref 4–11)

## 2022-02-18 PROCEDURE — 80053 COMPREHEN METABOLIC PANEL: CPT | Performed by: INTERNAL MEDICINE

## 2022-02-18 PROCEDURE — 258N000003 HC RX IP 258 OP 636: Performed by: STUDENT IN AN ORGANIZED HEALTH CARE EDUCATION/TRAINING PROGRAM

## 2022-02-18 PROCEDURE — 214N000001 HC R&B CCU UMMC

## 2022-02-18 PROCEDURE — 99233 SBSQ HOSP IP/OBS HIGH 50: CPT | Mod: GC | Performed by: INTERNAL MEDICINE

## 2022-02-18 PROCEDURE — 250N000011 HC RX IP 250 OP 636: Performed by: STUDENT IN AN ORGANIZED HEALTH CARE EDUCATION/TRAINING PROGRAM

## 2022-02-18 PROCEDURE — 250N000013 HC RX MED GY IP 250 OP 250 PS 637: Performed by: STUDENT IN AN ORGANIZED HEALTH CARE EDUCATION/TRAINING PROGRAM

## 2022-02-18 PROCEDURE — 85520 HEPARIN ASSAY: CPT | Performed by: INTERNAL MEDICINE

## 2022-02-18 PROCEDURE — 36415 COLL VENOUS BLD VENIPUNCTURE: CPT | Performed by: INTERNAL MEDICINE

## 2022-02-18 PROCEDURE — 85027 COMPLETE CBC AUTOMATED: CPT | Performed by: INTERNAL MEDICINE

## 2022-02-18 PROCEDURE — 250N000013 HC RX MED GY IP 250 OP 250 PS 637: Performed by: INTERNAL MEDICINE

## 2022-02-18 RX ORDER — LOPERAMIDE HCL 2 MG
2 CAPSULE ORAL ONCE
Status: COMPLETED | OUTPATIENT
Start: 2022-02-18 | End: 2022-02-18

## 2022-02-18 RX ADMIN — HEPARIN SODIUM 1100 UNITS/HR: 1000 INJECTION INTRAVENOUS; SUBCUTANEOUS at 01:02

## 2022-02-18 RX ADMIN — FLUOXETINE 20 MG: 20 CAPSULE ORAL at 08:17

## 2022-02-18 RX ADMIN — ACETAMINOPHEN 650 MG: 325 TABLET, FILM COATED ORAL at 19:40

## 2022-02-18 RX ADMIN — LOPERAMIDE HYDROCHLORIDE 2 MG: 2 CAPSULE ORAL at 17:05

## 2022-02-18 RX ADMIN — APIXABAN 10 MG: 5 TABLET, FILM COATED ORAL at 17:14

## 2022-02-18 RX ADMIN — PANTOPRAZOLE SODIUM 40 MG: 40 TABLET, DELAYED RELEASE ORAL at 08:17

## 2022-02-18 RX ADMIN — SENNOSIDES AND DOCUSATE SODIUM 2 TABLET: 50; 8.6 TABLET ORAL at 08:30

## 2022-02-18 ASSESSMENT — ACTIVITIES OF DAILY LIVING (ADL)
ADLS_ACUITY_SCORE: 6
ADLS_ACUITY_SCORE: 8
ADLS_ACUITY_SCORE: 6
ADLS_ACUITY_SCORE: 8
ADLS_ACUITY_SCORE: 6
ADLS_ACUITY_SCORE: 4
ADLS_ACUITY_SCORE: 6
ADLS_ACUITY_SCORE: 8
ADLS_ACUITY_SCORE: 8
ADLS_ACUITY_SCORE: 6
ADLS_ACUITY_SCORE: 6
ADLS_ACUITY_SCORE: 8
ADLS_ACUITY_SCORE: 6
ADLS_ACUITY_SCORE: 8
ADLS_ACUITY_SCORE: 8
ADLS_ACUITY_SCORE: 6
ADLS_ACUITY_SCORE: 6
ADLS_ACUITY_SCORE: 4
ADLS_ACUITY_SCORE: 6

## 2022-02-18 NOTE — PROGRESS NOTES
"SPIRITUAL HEALTH SERVICES  SPIRITUAL ASSESSMENT Progress Note  [facility]C     REFERRAL SOURCE: LOS    Pt was receptive to visit, and shared she was doing well, and \"its good to feel good again.\" Pt said her [employer] felecia is very important to her, and her and her daughter pray together each day when her daughter comes to see her.   offered support and introduction to SHS services.      PLAN: [facility]S will remain available     Michelle Miranda  Pager: [phone]    "

## 2022-02-18 NOTE — CONSULTS
Discharge Pharmacy Test Claim    Eliquis or xarelto is covered with a $25 copay through patient's Medicareue Part D plan.    Nayeli Patnio  [facility] Pharmacy Liaison  Ph: [phone] Pager: [phone]

## 2022-02-18 NOTE — PLAN OF CARE
"D:  PE with features of R heart strain    I: Monitored vitals and assessed pt status. Pt on bedrest from 6428-2733 d/t thrombectomy       Changed: heparin gtt, 8708-4137 unit(s)/ hour  Running: heparin 1100 units/hr  PRN: tylenol, LR bolus x2      A: A&Ox4. VSS on 2L NC. Tele shows SR/ST and bursts of SVT, Afebrile. Urinating adequately.   Once back from IR pressures began to be softer.     Groin site is unremarkable as well as pulses present in both equally post IR thrombectomy. Once bed rest was completed patient wanted to void on toilet. Pt reported feeling \"clammy:and \"dizzy\"      P: Continue to monitor pt status and report changes to CSI treatment team.     1900-0730                             "

## 2022-02-18 NOTE — PHARMACY-ADMISSION MEDICATION HISTORY
Admission Medication History Completed by Pharmacy    See [facility] Admission Navigator for allergy information, preferred outpatient pharmacy, prior to admission medications and immunization status.     Medication History Sources:     Patient via telephone, Surescripts    Changes made to PTA medication list (reason):    Added: None    Deleted: None    Changed: None    Additional Information:    Patient is a good historian.    Prior to Admission medications    Medication Sig Last Dose Taking? Auth Provider   Cholecalciferol (VITAMIN D) 125 MCG (5000 UT) capsule Take 1 capsule by mouth daily 2000 international unit(s) 2/16/2022 at Unknown time Yes Reported, Patient   FLUoxetine (PROZAC) 20 MG capsule TAKE 1 CAPSULE BY MOUTH EVERY DAY 2/16/2022  Latesha Hilario PA   omeprazole (PRILOSEC) 20 MG DR capsule Take 1 capsule (20 mg) by mouth daily 2/16/2022  Latesha Hilario PA       Date completed: 02/18/22    Medication history completed by: Yuli Sellers RPH

## 2022-02-18 NOTE — PLAN OF CARE
71 years old female with PMHx Obstructive Sleep Apnea, Prior Pulmonary Embolism, Obesity, Major Depressive Disorder, Pure Hypercholesterolemia, GERD, Colonic Tubular Adenomas, Osteopenia and Obesity who presented to the [facility] as a direct transfer from [facility] due to Acute Pulmonary Embolism and Right RV strain on both CTA chest and echocardiogram. Pt had thrombectomy and flow seal in place 2/16/22. IR team plan to remove flow seal after 48 hrs of procedure. Currently off heparin gtt and started on Eliquis PO.     Change from today:  *Heparin gtt stopped and Eliquis PO started  * 1 loose watery stool and pt not able to control it. Imodium given x1.   CSI verbal to update them if pt has another loose stool so pt can have C-diff specimen collected.   * Pt tolerate regular diet.   * pt had shower today.   * Pt stay Sinus tachycardia with activities and MD aware. SBP >90s. Aerotherapy essential oil stick given and encouraged deep breathing with good relief. Pt able to control her heart rate when she take deep breathing and relax.  * Aerotherapy stick given.  * Plan to discharge tomorrow once flow.       Neuro: A&O x4, denied pain, palpitations, difficulty breathing, SOB, dizziness, and nausea at rest or with activities.  Respiratory:  Had clear lung sounds. Maintain O2 saturation >94% on RA. Denies SOB with activities or at rest. IS encouraged.   Cardiac: Tele shows SR/ST HR 70- occasionally HR tachy in 140s with activities and anxiety. Aerotherapy sticks given and deep breathing with some relief per patient reported. Denies chest pain. Afebrile. Hbg 9.7< 10.4. Heparin gtt bolus and increased gtt 1250unit/hr per protocol.   GI: Denied nausea, bowel sounds audible. Tolerates regular diet.   : Had adequate urine output, see I&O flowsheet.  Skin: Flow seal on right femoral groin site. No drainage and no hematoma. CMS intact. Good Dorsal pulse on bilateral feet.  See PCS for assessment and treatment of  "wounds and surgical incisions.   VS: /84 (BP Location: Right arm)   Pulse 94   Temp 98.4  F (36.9  C) (Axillary)   Resp 16   Ht 1.626 m (5' 4\")   Wt 83 kg (182 lb 14.4 oz)   LMP 09/03/2001   SpO2 100%   BMI 31.39 kg/m     Drips:  Heparin gtt continued at 1250unit/hr.     Electrolytes: WNL replaced per protocol.  Pain: Reported no pain.  Mobility: Up to commode and bathroom with stand by assist, was steady on her feet.  Social: daughter visited during shift.    Plan: Maintain stable hemodynamics. Continue to monitor pain, VS, heart rhythm, fluid status, bowel status, cardiac and respiratory status.  Notify care team of changes in patient condition or other concerns.  Possible discharge tomorrow after flow seal remove      Goal Outcome Evaluation:  Problem: VTE (Venous Thromboembolism)  Goal: VTE (Venous Thromboembolism) Symptom Resolution  Outcome: Met     Problem: Plan of Care - These are the overarching goals to be used throughout the patient stay.    Goal: Plan of Care Review/Shift Note  Description: The Plan of Care Review/Shift note should be completed every shift.  The Outcome Evaluation is a brief statement about your assessment that the patient is improving, declining, or no change.  This information will be displayed automatically on your shift note.  Outcome: Ongoing, Progressing  Goal: Patient-Specific Goal (Individualized)  Description: You can add care plan individualizations to a care plan. Examples of Individualization might be:  \"Parent requests to be called daily at 9am for status\", \"I have a hard time hearing out of my right ear\", or \"Do not touch me to wake me up as it startles me\".  Outcome: Ongoing, Progressing  Goal: Optimal Comfort and Wellbeing  Outcome: Ongoing, Progressing  Goal: Readiness for Transition of Care  Outcome: Ongoing, Progressing     Problem: Plan of Care - These are the overarching goals to be used throughout the patient stay.    Goal: Absence of Hospital-Acquired " Illness or Injury  Intervention: Identify and Manage Fall Risk  Recent Flowsheet Documentation  Taken 2/18/2022 1245 by Edin Riley, RN  Safety Promotion/Fall Prevention:    activity supervised    clutter free environment maintained    bed alarm on    lighting adjusted    mobility aid in reach    patient and family education  Taken 2/18/2022 0816 by Edin Riley, RN  Safety Promotion/Fall Prevention:    activity supervised    clutter free environment maintained    bed alarm on    lighting adjusted    mobility aid in reach    patient and family education  Intervention: Prevent Skin Injury  Recent Flowsheet Documentation  Taken 2/18/2022 1245 by Edin Riley, RN  Body Position: supine  Taken 2/18/2022 0816 by Edin Riley, RN  Body Position: supine  Intervention: Prevent and Manage VTE (Venous Thromboembolism) Risk  Recent Flowsheet Documentation  Taken 2/18/2022 1245 by Edin Riley, RN  Activity Management: bedrest  Taken 2/18/2022 0816 by Edin Riley, RN  Activity Management: bedrest

## 2022-02-18 NOTE — PROCEDURES
[facility]    Procedure: IR Procedure Note    Date/Time: 2/17/2022 7:26 PM  Performed by: Linda Craft MD  Authorized by: Linda Craft MD   IR Fellow Physician:  Radiology Resident Physician: Demond Polk        UNIVERSAL PROTOCOL   Site Marked: NA  Prior Images Obtained and Reviewed:  Yes  Required items: Required blood products, implants, devices and special equipment available    Patient identity confirmed:  Verbally with patient, arm band, provided demographic data and hospital-assigned identification number  Patient was reevaluated immediately before administering moderate or deep sedation or anesthesia  Confirmation Checklist:  Patient's identity using two indicators, relevant allergies, procedure was appropriate and matched the consent or emergent situation and correct equipment/implants were available  Time out: Immediately prior to the procedure a time out was called    Universal Protocol: the Joint Commission Universal Protocol was followed    Preparation: Patient was prepped and draped in usual sterile fashion       ANESTHESIA    Anesthesia: Local infiltration  Local Anesthetic:  Lidocaine 1% without epinephrine      SEDATION  Patient Sedated: Yes    Sedation:  Fentanyl and midazolam  Vital signs: Vital signs monitored during sedation    See dictated procedure note for full details.  Findings: Successful bilateral pulmonary artery thrombectomy. 500 ml blood loss    Specimens: none    Complications: None    Condition: Stable    Plan: Return to inpatient care      PROCEDURE    Patient Tolerance:  Patient tolerated the procedure well with no immediate complications  Length of time physician/provider present for 1:1 monitoring during sedation: 180

## 2022-02-18 NOTE — PROGRESS NOTES
"IR Progress Note    Subjective  Patient had an episode of clamminess and dizziness when she stood up to go to the restroom.  Otherwise did well overnight and getting good sleep.  No significant shortness of breath or chest pain.  Primary complaint is discomfort associated with bedrest.    Objective  /74 (BP Location: Right arm, Cuff Size: Adult Regular)   Pulse 82   Temp 98.4  F (36.9  C) (Axillary)   Resp 16   Ht 1.626 m (5' 4\")   Wt 83 kg (182 lb 14.4 oz)   LMP 09/03/2001   SpO2 99%   BMI 31.39 kg/m      Physical Exam  General: Awake, alert while laying in bed  Cardiovascular: Warm well perfused.  Right groin access site clean dry intact without evidence of contusion.  Respiratory: Clear to auscultation bilaterally.  Abdomen: Soft, nontender    Last Comprehensive Metabolic Panel:  Sodium   Date Value Ref Range Status   02/17/2022 142 133 - 144 mmol/L Final   02/16/2022 139.3 135 - 146 mmol/L Final     Potassium   Date Value Ref Range Status   02/17/2022 4.2 3.4 - 5.3 mmol/L Final   02/16/2022 4.65 3.5 - 5.3 mmol/L Final     Chloride   Date Value Ref Range Status   02/17/2022 112 (H) 94 - 109 mmol/L Final   02/16/2022 107.4 98 - 110 mmol/L Final     Carbon Dioxide   Date Value Ref Range Status   02/16/2022 22.2 20 - 32 mmol/L Final     Carbon Dioxide (CO2)   Date Value Ref Range Status   02/17/2022 22 20 - 32 mmol/L Final     Anion Gap   Date Value Ref Range Status   02/17/2022 8 3 - 14 mmol/L Final     Glucose   Date Value Ref Range Status   02/17/2022 104 (H) 70 - 99 mg/dL Final   02/16/2022 106 (A) 60 - 99 mg/dL Final     Urea Nitrogen   Date Value Ref Range Status   02/17/2022 13 7 - 30 mg/dL Final   02/16/2022 12 7 - 25 mg/dL Final     BUN/Creatinine Ratio   Date Value Ref Range Status   02/16/2022 12.1 6 - 22 Final     Creatinine   Date Value Ref Range Status   02/17/2022 0.90 0.52 - 1.04 mg/dL Final   02/16/2022 0.99 0.60 - 1.30 mg/dL Final     GFR Estimate   Date Value Ref Range Status "   02/17/2022 68 >60 mL/min/1.73m2 Final     Comment:     Effective December 21, 2021 eGFRcr in adults is calculated using the 2021 CKD-EPI creatinine equation which includes age and gender (Maame et al., [place], DOI: 10.1056/BDTNzz8721892)   11/09/2016 85 > OR = 60 mL/min/1.73m2 Final     Calcium   Date Value Ref Range Status   02/17/2022 9.1 8.5 - 10.1 mg/dL Final   02/16/2022 9.6 8.6 - 10.3 mg/dL Final     Lab Results   Component Value Date    WBC 7.6 02/17/2022    WBC 6.3 04/20/2018     Lab Results   Component Value Date    RBC 4.71 02/17/2022    RBC 5.10 04/20/2018     Lab Results   Component Value Date    HGB 10.4 02/17/2022    HGB 14.9 04/20/2018     Lab Results   Component Value Date    HCT 42.4 02/17/2022    HCT 46.7 04/20/2018     Lab Results   Component Value Date    MCV 90 02/17/2022    MCV 91.6 04/20/2018     Lab Results   Component Value Date    MCH 28.2 02/17/2022    MCH 29.2 04/20/2018     Lab Results   Component Value Date    MCHC 31.4 02/17/2022    MCHC 31.9 04/20/2018     Lab Results   Component Value Date    RDW 14.0 02/17/2022    RDW 12.9 04/20/2018     Lab Results   Component Value Date     02/17/2022     04/20/2018     Imaging  Ultrasound lower extremities  -No evidence of DVT  in either lower extremity.    Assessment/Plan  Nataly Kee is a 71-year-old female with history of obesity, sleep apnea and recent Covid infection who was found to have intermediate high risk pulmonary embolism now status post pulmonary artery thrombectomy.  Post thrombectomy pulmonary artery pressures were significantly improved.  Overall patient is doing well with improved heart rate and SpO2. She did have a 2 g hemoglobin drop following the procedure. Her episode of dizziness when standing may be secondary to vasovagal episode and/or orthostatic hypotension given recent hemoglobin drop. No repeat episodes. Recommend continuing to monitor.   -Continue excellent care per primary team  -FlowStasis remains in  place. Will plan to remove at two days post procedure.    I agree with the assessment and plan documented by Dr. Polk.    Linda Craft MD  Interventional Radiology   Pager [phone]

## 2022-02-19 VITALS
DIASTOLIC BLOOD PRESSURE: 78 MMHG | HEIGHT: 64 IN | TEMPERATURE: 98.6 F | RESPIRATION RATE: 16 BRPM | HEART RATE: 77 BPM | BODY MASS INDEX: 31.07 KG/M2 | WEIGHT: 182 LBS | OXYGEN SATURATION: 96 % | SYSTOLIC BLOOD PRESSURE: 109 MMHG

## 2022-02-19 LAB
ALBUMIN SERPL-MCNC: 2.8 G/DL (ref 3.4–5)
ALP SERPL-CCNC: 70 U/L (ref 40–150)
ALT SERPL W P-5'-P-CCNC: 11 U/L (ref 0–50)
ANION GAP SERPL CALCULATED.3IONS-SCNC: 6 MMOL/L (ref 3–14)
AST SERPL W P-5'-P-CCNC: 10 U/L (ref 0–45)
BILIRUB SERPL-MCNC: 0.3 MG/DL (ref 0.2–1.3)
BUN SERPL-MCNC: 15 MG/DL (ref 7–30)
CALCIUM SERPL-MCNC: 8.9 MG/DL (ref 8.5–10.1)
CHLORIDE BLD-SCNC: 114 MMOL/L (ref 94–109)
CO2 SERPL-SCNC: 23 MMOL/L (ref 20–32)
CREAT SERPL-MCNC: 0.9 MG/DL (ref 0.52–1.04)
ERYTHROCYTE [DISTWIDTH] IN BLOOD BY AUTOMATED COUNT: 14 % (ref 10–15)
GFR SERPL CREATININE-BSD FRML MDRD: 68 ML/MIN/1.73M2
GLUCOSE BLD-MCNC: 108 MG/DL (ref 70–99)
HCT VFR BLD AUTO: 30.1 % (ref 35–47)
HGB BLD-MCNC: 9.3 G/DL (ref 11.7–15.7)
MCH RBC QN AUTO: 28.4 PG (ref 26.5–33)
MCHC RBC AUTO-ENTMCNC: 30.9 G/DL (ref 31.5–36.5)
MCV RBC AUTO: 92 FL (ref 78–100)
PLATELET # BLD AUTO: 266 10E3/UL (ref 150–450)
POTASSIUM BLD-SCNC: 4 MMOL/L (ref 3.4–5.3)
PROT SERPL-MCNC: 6 G/DL (ref 6.8–8.8)
RBC # BLD AUTO: 3.27 10E6/UL (ref 3.8–5.2)
SARS-COV-2 AB SERPL QL IA: POSITIVE
SODIUM SERPL-SCNC: 143 MMOL/L (ref 133–144)
WBC # BLD AUTO: 7.6 10E3/UL (ref 4–11)

## 2022-02-19 PROCEDURE — 99239 HOSP IP/OBS DSCHRG MGMT >30: CPT | Mod: GC | Performed by: INTERNAL MEDICINE

## 2022-02-19 PROCEDURE — 36415 COLL VENOUS BLD VENIPUNCTURE: CPT | Performed by: INTERNAL MEDICINE

## 2022-02-19 PROCEDURE — 82040 ASSAY OF SERUM ALBUMIN: CPT | Performed by: INTERNAL MEDICINE

## 2022-02-19 PROCEDURE — 85027 COMPLETE CBC AUTOMATED: CPT | Performed by: INTERNAL MEDICINE

## 2022-02-19 PROCEDURE — 80053 COMPREHEN METABOLIC PANEL: CPT | Performed by: INTERNAL MEDICINE

## 2022-02-19 PROCEDURE — 250N000013 HC RX MED GY IP 250 OP 250 PS 637: Performed by: STUDENT IN AN ORGANIZED HEALTH CARE EDUCATION/TRAINING PROGRAM

## 2022-02-19 PROCEDURE — 999N000128 HC STATISTIC PERIPHERAL IV START W/O US GUIDANCE

## 2022-02-19 PROCEDURE — 250N000013 HC RX MED GY IP 250 OP 250 PS 637: Performed by: INTERNAL MEDICINE

## 2022-02-19 RX ADMIN — APIXABAN 10 MG: 5 TABLET, FILM COATED ORAL at 08:06

## 2022-02-19 RX ADMIN — FLUOXETINE 20 MG: 20 CAPSULE ORAL at 08:09

## 2022-02-19 RX ADMIN — PANTOPRAZOLE SODIUM 40 MG: 40 TABLET, DELAYED RELEASE ORAL at 08:09

## 2022-02-19 ASSESSMENT — ACTIVITIES OF DAILY LIVING (ADL)
ADLS_ACUITY_SCORE: 8

## 2022-02-19 NOTE — DISCHARGE SUMMARY
[facility]    Cardiology Discharge Summary- Cardiology         Date of Admission:  2/16/2022  Date of Discharge:  2/19/2022  2:22 PM  Discharging Provider: Dr. Arguelles      Discharge Diagnoses   Acute PE    Follow-ups Needed After Discharge   Follow-up Appointments     Adult [facility] Follow-up and recommended labs and tests      Follow up with primary care provider, Latesha Hilario, within 7   days to evaluate medication change.  No follow up labs or test are needed.        Follow up with cardiology. Appointment to be scheduled.     Appointments on [place] and/or [facility] (with [facility] or [facility]   provider or service). Call [phone] if you haven't heard regarding   these appointments within 7 days of discharge.           Unresulted Labs Ordered in the Past 30 Days of this Admission     No orders found from 1/17/2022 to 2/17/2022.        Discharge Disposition   Discharged to home  Condition at discharge: Stable    Hospital Course   Nataly Kee is a 71 year old female with PMHx relevant for Obstructive Sleep Apnea, Prior Pulmonary Embolism, Obesity, Major Depressive Disorder , Pure Hypercholesterolemia, GERD, Colonic Tubular Adenomas, Osteopenia and Obesity who presented to the [facility] as a direct transfer from [facility] due to Acute Pulmonary Embolism with features of Right RV strain on both CTA chest and echocardiogram. S/p thrombectomy 2/17/22 w/o complications.    Consultations This Hospital Stay   PHARMACY IP CONSULT  PHARMACY IP CONSULT  INTERVENTIONAL RADIOLOGY ADULT/PEDS IP CONSULT  IV TEAM IP CONSULT  VASCULAR ACCESS CARE ADULT IP CONSULT  PHARMACY LIAISON FOR MEDICATION COVERAGE CONSULT  VASCULAR ACCESS CARE ADULT IP CONSULT  SMOKING CESSATION PROGRAM IP CONSULT    Code Status   Full Code        Virgilio Iyer MD  [facility]  [place]  ______________________________________________________________________    Physical Exam   Vital Signs: Temp: 98.6  F (37  C) Temp src: Oral BP: 109/78 Pulse: 77   Resp: 16 SpO2: 96 % O2 Device: None (Room air)    Weight: 182 lbs 0 oz  In general, the patient is a pleasant female in no apparent distress.      HEENT: NC/AT.  PERRLA.  EOMI.  Sclerae white, not injected.    Neck: Carotids 2+ bilaterally without bruits.  No jugular venous distension.   Lymph: No cervical adenopathy. No thyromegaly.   Heart: RRR. Normal S1, S2. No murmur, rub, click, or gallop. There is no heave.    Lungs: Clear bilaterally.  No rhonchi, wheezes, rales.   GI: Soft, nontender, nondistended.   Extremities: No edema.  The pulses are 2+at the radial and DP bilaterally.  Neuro: grossly non focal.   Skin: no rashes.  Musculoskeletal: no joint swelling or tenderness, gait normal.  Psych: pleasant and conversant       Primary Care Physician   Latesha Hilario    Discharge Orders      Reason for your hospital stay    You were admitted for an acute pulmonary embolism. You had a procedure to remove the clot. Following this procedure you did well and were started on an oral blood thinner.     Activity    Your activity upon discharge: activity as tolerated     Adult [facility] Follow-up and recommended labs and tests    Follow up with primary care provider, Latesha Hilario, within 7 days to evaluate medication change.  No follow up labs or test are needed.      Follow up with cardiology. Appointment to be scheduled.     Appointments on [place] and/or [facility] (with [facility] or [facility] provider or service). Call [phone] if you haven't heard regarding these appointments within 7 days of discharge.     Diet    Follow this diet upon discharge: Orders Placed This Encounter      Advance Diet as Tolerated: Regular Diet Adult       Significant Results and Procedures   Results for orders placed or performed during the hospital  encounter of 02/16/22   US Lower Extremity Venous Duplex Bilateral    Narrative    EXAMINATION: DOPPLER VENOUS ULTRASOUND OF BILATERAL LOWER EXTREMITIES,  2/17/2022 10:31 AM     INDICATION: Acute pulmonary embolism. Rule out DVT.    COMPARISON: None.    TECHNIQUE:  Gray-scale evaluation with compression, spectral flow and  color Doppler assessment of the deep venous system of both legs from  groin to knee, and then at the ankles.     FINDINGS:  In both lower extremities, the common femoral, femoral, popliteal and  posterior tibial veins demonstrate normal compressibility and blood  flow.      Impression    IMPRESSION:  1.  No evidence of deep venous thrombosis in either lower extremity.    I have personally reviewed the examination and initial interpretation  and I agree with the findings.    EMBER SCHUMACHER MD         SYSTEM ID:  [id number]       Discharge Medications   Discharge Medication List as of 2/19/2022  1:53 PM      START taking these medications    Details   !! apixaban ANTICOAGULANT (ELIQUIS) 5 MG tablet Take 2 tablets (10 mg) by mouth 2 times daily for 5 days, Disp-20 tablet, R-0, E-Prescribe      !! apixaban ANTICOAGULANT (ELIQUIS) 5 MG tablet Take 1 tablet (5 mg) by mouth 2 times daily, Disp-60 tablet, R-3, E-Prescribe       !! - Potential duplicate medications found. Please discuss with provider.      CONTINUE these medications which have NOT CHANGED    Details   Cholecalciferol (VITAMIN D) 125 MCG (5000 UT) capsule Take 1 capsule by mouth daily 2000 international unit(s), Historical      FLUoxetine (PROZAC) 20 MG capsule TAKE 1 CAPSULE BY MOUTH EVERY DAY, Disp-90 capsule, R-3, E-Prescribe      omeprazole (PRILOSEC) 20 MG DR capsule Take 1 capsule (20 mg) by mouth daily, Disp-90 capsule, R-3, E-Prescribe           Allergies   Allergies   Allergen Reactions     No Known Drug Allergies

## 2022-02-19 NOTE — PROGRESS NOTES
[facility]    Cardiology History and Physical - Interventional  Date of Admission:  2/16/2022    Assessment & Plan: S       Nataly Kee is a 71 year old female with PMHx relevant for Obstructive Sleep Apnea, Prior Pulmonary Embolism, Obesity, Major Depressive Disorder , Pure Hypercholesterolemia, GERD, Colonic Tubular Adenomas, Osteopenia and Obesity who presented to the [facility] as a direct transfer from [facility] due to Acute Pulmonary Embolism with features of Right RV strain on both CTA chest and echocardiogram. S/p thrombectomy 2/17/22 w/o complications.    # Acute multiple central pulmonary embolism (large burden)     > RV strain evidence on CTA chest and Echocardiogram     > Unprovoked  # History of PE (~20 year ago) post-surgical intervention (Hysterectomy)  # No history of DVT or Hypercoagulable disorder  - Presentation: Acute to subacute reports of SOB/CALVILLO (for the past 10 days) and diagnosed with large burden of PE at [facility].   - Echo (02/16/22): LVEF 60-65% although significant RV dilation/moderately reduced systolic function with TV regurgitation (+1) and a PASP ~70mmHg. ECG essentially revealed sinus tachycardia with non-specific T wave abnormality (leads V4-V6).    D-Dimer and NT-Pro BNP elevated while Troponin Ix 1 WNL.  - Provoked PE 20 yrs ago following hysterectomy  - s/p mechanical thrombectomy 2/17/22    - Switch to Apixaban, discontinue heparin. Cost verified with pharmacy, $25 per month.  - Venous Doppler US: negative    # Elevated TSH - Likely subclinical Hypothyroidism  #HLD     Diet: NPO for medical reasons  DVT Prophylaxis: Heparin gtt  [place] Catheter: Not present  Code Status: Full Code  Lines: PIV     Disposition Plan   Expected discharge: 2 - 3 days, recommended to prior living arrangement once fluid volume status optimized on oral medication and O2 use less than 1 liters/minute.    Entered: Mohsan Chaudhry,  MD 02/18/2022, 6:24 PM    Mohsan Chaudhry MD  Cardiology Fellow    [facility]    _____________________________________________________________________  SUBJECTIVE:  No acute events overnight. HD stable. Underwent thrombectomy w/o complications. Groin site w/o bleeding or hematoma. SOB significantly improved after thrombectomy.    Physical Exam   Vital Signs: Temp: 98.4  F (36.9  C) Temp src: Oral BP: 101/73 Pulse: (!) 142   Resp: 18 SpO2: 95 %   Oxygen Delivery: 2 LPM  Weight: 182 lbs 14.4 oz    Constitutional: awake, alert, cooperative, no apparent distress, and appears stated age  Eyes: Lids and lashes normal, pupils equal, round and reactive to light, extra ocular muscles intact, sclera clear, conjunctiva normal  ENT: Normocephalic, without obvious abnormality, atraumatic, sinuses nontender on palpation, external ears without lesions, oral pharynx with moist mucous membranes, tonsils without erythema or exudates, gums normal and good dentition.  Respiratory: No increased work of breathing, good air exchange, clear to auscultation bilaterally, no crackles or wheezing  Cardiovascular: Normal apical impulse, regular rate and rhythm, normal S1 and S2, no S3 or S4, and no murmur noted  GI: No scars, normal bowel sounds, soft, non-distended, non-tender, no masses palpated, no hepatosplenomegally  Skin: no bruising or bleeding, normal skin color, texture, turgor, no redness, warmth, or swelling, no rashes, no lesions and no jaundice. Groin site w/o bleeding.  Musculoskeletal: There is no redness, warmth, or swelling of the joints. Full range of motion noted.  Motor strength is 5 out of 5 all extremities bilaterally. Tone is normal.  Neurologic: Awake, alert, oriented to name, place and time. Cranial nerves II-XII are grossly intact.  Motor is 5 out of 5 bilaterally. Sensory is intact.    Data   Data reviewed today: I reviewed all medications, new labs and imaging results  over the last 24 hours.     Results for orders placed or performed during the hospital encounter of 02/16/22 (from the past 24 hour(s))   Activated clotting time celite, POCT   Result Value Ref Range    Activated Clotting Time (Celite) POCT 211 (H) 74 - 150 seconds   Hemoglobin   Result Value Ref Range    Hemoglobin 10.8 (L) 11.7 - 15.7 g/dL   IR Procedure Note    Narrative    Demond Polk MD     2/17/2022  7:27 PM  [facility]    Procedure: IR Procedure Note    Date/Time: 2/17/2022 7:26 PM  Performed by: Linda Craft MD  Authorized by: Linda Craft MD   IR Fellow Physician:  Radiology Resident Physician: Demond Polk        UNIVERSAL PROTOCOL   Site Marked: NA  Prior Images Obtained and Reviewed:  Yes  Required items: Required blood products, implants, devices and special   equipment available    Patient identity confirmed:  Verbally with patient, arm band, provided   demographic data and hospital-assigned identification number  Patient was reevaluated immediately before administering moderate or deep   sedation or anesthesia  Confirmation Checklist:  Patient's identity using two indicators, relevant   allergies, procedure was appropriate and matched the consent or emergent   situation and correct equipment/implants were available  Time out: Immediately prior to the procedure a time out was called    Universal Protocol: the Joint Commission Universal Protocol was followed    Preparation: Patient was prepped and draped in usual sterile fashion       ANESTHESIA    Anesthesia: Local infiltration  Local Anesthetic:  Lidocaine 1% without epinephrine      SEDATION  Patient Sedated: Yes    Sedation:  Fentanyl and midazolam  Vital signs: Vital signs monitored during sedation    See dictated procedure note for full details.  Findings: Successful bilateral pulmonary artery thrombectomy. 500 ml blood   loss    Specimens: none    Complications:  None    Condition: Stable    Plan: Return to inpatient care      PROCEDURE    Patient Tolerance:  Patient tolerated the procedure well with no immediate   complications  Length of time physician/provider present for 1:1 monitoring during   sedation: 180   Heparin Unfractionated Anti Xa Level   Result Value Ref Range    Anti Xa Unfractionated Heparin >1.10 (HH) For Reference Range, See Comment IU/mL    Narrative    Therapeutic Range: UFH: 0.25-0.50 IU/mL for low intensity dosing,  0.30-0.70 IU/mL for high intensity dosing DVT and PE.  This test is not validated for other direct factor X inhibitors (e.g. rivaroxaban, apixaban, edoxaban, betrixaban, fondaparinux) and should not be used for monitoring of other medications.   Hemoglobin   Result Value Ref Range    Hemoglobin 10.4 (L) 11.7 - 15.7 g/dL   Heparin Unfractionated Anti Xa Level   Result Value Ref Range    Anti Xa Unfractionated Heparin 0.25 For Reference Range, See Comment IU/mL    Narrative    Therapeutic Range: UFH: 0.25-0.50 IU/mL for low intensity dosing,  0.30-0.70 IU/mL for high intensity dosing DVT and PE.  This test is not validated for other direct factor X inhibitors (e.g. rivaroxaban, apixaban, edoxaban, betrixaban, fondaparinux) and should not be used for monitoring of other medications.   Comprehensive metabolic panel   Result Value Ref Range    Sodium 140 133 - 144 mmol/L    Potassium 4.2 3.4 - 5.3 mmol/L    Chloride 112 (H) 94 - 109 mmol/L    Carbon Dioxide (CO2) 22 20 - 32 mmol/L    Anion Gap 6 3 - 14 mmol/L    Urea Nitrogen 12 7 - 30 mg/dL    Creatinine 0.82 0.52 - 1.04 mg/dL    Calcium 8.4 (L) 8.5 - 10.1 mg/dL    Glucose 100 (H) 70 - 99 mg/dL    Alkaline Phosphatase 61 40 - 150 U/L    AST 12 0 - 45 U/L    ALT 12 0 - 50 U/L    Protein Total 5.2 (L) 6.8 - 8.8 g/dL    Albumin 2.2 (L) 3.4 - 5.0 g/dL    Bilirubin Total 0.5 0.2 - 1.3 mg/dL    GFR Estimate 76 >60 mL/min/1.73m2   CBC with platelets   Result Value Ref Range    WBC Count 8.6 4.0 - 11.0  10e3/uL    RBC Count 3.40 (L) 3.80 - 5.20 10e6/uL    Hemoglobin 9.7 (L) 11.7 - 15.7 g/dL    Hematocrit 31.3 (L) 35.0 - 47.0 %    MCV 92 78 - 100 fL    MCH 28.5 26.5 - 33.0 pg    MCHC 31.0 (L) 31.5 - 36.5 g/dL    RDW 14.1 10.0 - 15.0 %    Platelet Count 206 150 - 450 10e3/uL   Pharmacy Liaison for Medication Coverage    Nayeli Moreau     2/18/2022  9:26 AM  Discharge Pharmacy Test Claim    Eliquis or xarelto is covered with a $25 copay through patient's   MedicareBlue Part D plan.    Nayeli Patino  [facility] Pharmacy Liaison  Ph: [phone] Pager: [phone]            Heparin Unfractionated Anti Xa Level   Result Value Ref Range    Anti Xa Unfractionated Heparin 0.36 For Reference Range, See Comment IU/mL    Narrative    Therapeutic Range: UFH: 0.25-0.50 IU/mL for low intensity dosing,  0.30-0.70 IU/mL for high intensity dosing DVT and PE.  This test is not validated for other direct factor X inhibitors (e.g. rivaroxaban, apixaban, edoxaban, betrixaban, fondaparinux) and should not be used for monitoring of other medications.         I have seen and examined the patient and agree with the finding and plan.       Jose Thompson MD  Cardiology-[facility]  [phone]

## 2022-02-19 NOTE — PROGRESS NOTES
"IR Progress Note    Subjective  Pt states she is doing well. Feels much better than prior to intervention. Denies shortness of breath or any chest pain. Has been able to move around without issue. No episodes of dizziness or lightheadedness.    Objective  /78   Pulse 77   Temp 98.6  F (37  C) (Oral)   Resp 16   Ht 1.626 m (5' 4\")   Wt 82.6 kg (182 lb)   LMP 09/03/2001   SpO2 96%   BMI 31.24 kg/m      Physical Exam  General: Awake, alert and lying in bed comfortably.  Cardiovascular: Warm well perfused.  Right groin access site clean dry intact without evidence of contusion.  Respiratory: Normal respiratory effort on room air. Clear to auscultation bilaterally.  Skin: Right groin venotomy access site is c/d/i. FlowStasis in place. No substantial ecchymosis.    Last Comprehensive Metabolic Panel:  Sodium   Date Value Ref Range Status   02/19/2022 143 133 - 144 mmol/L Final   02/16/2022 139.3 135 - 146 mmol/L Final     Potassium   Date Value Ref Range Status   02/19/2022 4.0 3.4 - 5.3 mmol/L Final   02/16/2022 4.65 3.5 - 5.3 mmol/L Final     Chloride   Date Value Ref Range Status   02/19/2022 114 (H) 94 - 109 mmol/L Final   02/16/2022 107.4 98 - 110 mmol/L Final     Carbon Dioxide   Date Value Ref Range Status   02/16/2022 22.2 20 - 32 mmol/L Final     Carbon Dioxide (CO2)   Date Value Ref Range Status   02/19/2022 23 20 - 32 mmol/L Final     Anion Gap   Date Value Ref Range Status   02/19/2022 6 3 - 14 mmol/L Final     Glucose   Date Value Ref Range Status   02/19/2022 108 (H) 70 - 99 mg/dL Final   02/16/2022 106 (A) 60 - 99 mg/dL Final     Urea Nitrogen   Date Value Ref Range Status   02/19/2022 15 7 - 30 mg/dL Final   02/16/2022 12 7 - 25 mg/dL Final     BUN/Creatinine Ratio   Date Value Ref Range Status   02/16/2022 12.1 6 - 22 Final     Creatinine   Date Value Ref Range Status   02/19/2022 0.90 0.52 - 1.04 mg/dL Final   02/16/2022 0.99 0.60 - 1.30 mg/dL Final     GFR Estimate   Date Value Ref Range " Status   02/19/2022 68 >60 mL/min/1.73m2 Final     Comment:     Effective December 21, 2021 eGFRcr in adults is calculated using the 2021 CKD-EPI creatinine equation which includes age and gender (Maame alexandre al., NEJM, DOI: 10.1056/EVHQtq9794763)   11/09/2016 85 > OR = 60 mL/min/1.73m2 Final     Calcium   Date Value Ref Range Status   02/19/2022 8.9 8.5 - 10.1 mg/dL Final   02/16/2022 9.6 8.6 - 10.3 mg/dL Final     Lab Results   Component Value Date    WBC 7.6 02/19/2022    WBC 6.3 04/20/2018     Lab Results   Component Value Date    RBC 3.27 02/19/2022    RBC 5.10 04/20/2018     Lab Results   Component Value Date    HGB 9.3 02/19/2022    HGB 14.9 04/20/2018     Lab Results   Component Value Date    HCT 30.1 02/19/2022    HCT 46.7 04/20/2018     No components found for: MCT  Lab Results   Component Value Date    MCV 92 02/19/2022    MCV 91.6 04/20/2018     Lab Results   Component Value Date    MCH 28.4 02/19/2022    MCH 29.2 04/20/2018     Lab Results   Component Value Date    MCHC 30.9 02/19/2022    MCHC 31.9 04/20/2018     Lab Results   Component Value Date    RDW 14.0 02/19/2022    RDW 12.9 04/20/2018     Lab Results   Component Value Date     02/19/2022     04/20/2018       Imaging  Ultrasound lower extremities  -No evidence of DVT  in either lower extremity.    Assessment/Plan  Nataly Kee is a 71-year-old female with history of obesity, sleep apnea and recent Covid infection who was found to have intermediate high risk pulmonary embolism now status post pulmonary artery thrombectomy.  Post thrombectomy pulmonary artery pressures were significantly improved.  Overall patient is doing well with improved heart rate and SpO2. Subjectively, she reports significantly improved breathing and comfort. Objectively, she is satting in the mid 90s on room air with normal respiratory effort and with HR and BP WNL. Initially postprocedurally she did have a 2 g hemoglobin drop following the procedure but this has  generally normalized and is relatively stable at 9.3 compared to 9.7 yesterday. One episode of dizziness two nights ago was isolated. No further episodes of dizziness.      -Continue excellent care per primary team  -Venotomy access site care: FlowStasis device removed. No bathing for 1 week. Ok to shower. Dressing changes as needed if they become soiled for 1-2 weeks. If patient prefers to do daily dressing changes that is ok.    -IR will sign off.     Please re-consult us if any other questions or concerns arise.

## 2022-02-19 NOTE — PLAN OF CARE
D: Pulmonary Embolism and Right RV strain on both CTA chest and echocardiogram. Pt had thrombectomy and flow seal in place 2/16/22.      I: Monitored vitals and assessed pt status. Encouraged activity.      A: A&Ox4. VSS on RA. Tele shows SR-ST. Afebrile. Urinating adequately.pt denied pain, used home cpap, no issues with flowseal.        P: Continue to monitor pt status and report changes to treatment team. flow seal to be removed today.     [phone]

## 2022-02-23 ENCOUNTER — VIRTUAL VISIT (OUTPATIENT)
Dept: CARDIOLOGY | Facility: CLINIC | Age: 72
End: 2022-02-23
Payer: MEDICARE

## 2022-02-23 DIAGNOSIS — I26.09 ACUTE PULMONARY EMBOLISM WITH ACUTE COR PULMONALE, UNSPECIFIED PULMONARY EMBOLISM TYPE (H): Primary | ICD-10-CM

## 2022-02-23 PROCEDURE — 99215 OFFICE O/P EST HI 40 MIN: CPT | Mod: 95 | Performed by: INTERNAL MEDICINE

## 2022-02-23 NOTE — PROGRESS NOTES
Nataly is a 71 year old who is being evaluated via a billable video visit.      How would you like to obtain your AVS? Mail a copy  If the video visit is dropped, the invitation should be resent by: Text to cell phone: [phone]  Will anyone else be joining your video visit? No        Review Of Systems  Skin: bruise to left arm from IV  Eyes: negative  Ears/Nose/Throat: negative  Respiratory: SOB   Cardiovascular:negative   Gastrointestinal:negative  Genitourinary: negative  Musculoskeletal: negative   Neurologic: negative  Psychiatric: negative   Hematologic/Lymphatic/Immunologic:   Endocrine: negative       Video Start Time: 10:30   Video  End Tme 10:45  Video-Visit Details    Type of service:  Video Visit    Video End Time:10:45    Originating Location (pt. Location): home    Distant Location (provider location):  [facility]     [facility]  HISTORY OF PRESENT ILLNESS:  Not quite back to baseline, but improving. Wants to resume exercise Questions about hereditary  Thrombophilia  Tolerating apixaban question about  Holding for  Dental  work    Orders this Visit:  No orders of the defined types were placed in this encounter.    No orders of the defined types were placed in this encounter.    There are no discontinued medications.    No diagnosis found.    CURRENT MEDICATIONS:  Current Outpatient Medications   Medication Sig Dispense Refill     apixaban ANTICOAGULANT (ELIQUIS) 5 MG tablet Take 2 tablets (10 mg) by mouth 2 times daily for 5 days 20 tablet 0     [START ON 2/25/2022] apixaban ANTICOAGULANT (ELIQUIS) 5 MG tablet Take 1 tablet (5 mg) by mouth 2 times daily 60 tablet 3     Cholecalciferol (VITAMIN D) 125 MCG (5000 UT) capsule Take 1 capsule by mouth daily 2000 international unit(s)       FLUoxetine (PROZAC) 20 MG capsule TAKE 1 CAPSULE BY MOUTH EVERY DAY 90 capsule 3     omeprazole (PRILOSEC) 20 MG DR capsule Take 1 capsule (20 mg) by  mouth daily 90 capsule 3       ALLERGIES     Allergies   Allergen Reactions     No Known Drug Allergies        PAST MEDICAL, SURGICAL, FAMILY, SOCIAL HISTORY:  History was reviewed and updated as needed, see medical record.    Review of Systems:  A 12-point review of systems was completed, see medical record for detailed review of systems information.    Physical Exam:  Vitals: LMP 09/03/2001     Constitutional:           Skin:           Head:           Eyes:           ENT:           Neck:           Chest:           Cardiac:                    Abdomen:           Vascular:                                        Extremities and Back:           Neurological:           ASSESSMENT: Stable. Will need follow up  TTE and clinic  Visit. Agree with hematology referral as this is second event and questionable family history of thrombophilia     RECOMMENDATIONS:   Primary care MD to refer to  Hematologist about thrombophilia work up  Continue  apixaban  Follow-up with me  With TTE  In 2 months  Exercise ok  Delay dental work for 3 months at least. Usually cleaning does not require interruption of anticoagulation, but if so,notfiy us first.      Recent Lab Results:  LIPID RESULTS:  Lab Results   Component Value Date    CHOL 232 (A) 02/16/2022    HDL 48 02/16/2022     (A) 02/16/2022     (H) 04/20/2018    TRIG 138 02/16/2022    CHOLHDLRATIO 5 02/16/2022       LIVER ENZYME RESULTS:  Lab Results   Component Value Date    AST 10 02/19/2022    AST 22 08/05/2013    ALT 11 02/19/2022    ALT 22 08/05/2013       CBC RESULTS:  Lab Results   Component Value Date    WBC 7.6 02/19/2022    WBC 6.3 04/20/2018    RBC 3.27 (L) 02/19/2022    RBC 5.10 04/20/2018    HGB 9.3 (L) 02/19/2022    HGB 14.9 04/20/2018    HCT 30.1 (L) 02/19/2022    HCT 46.7 04/20/2018    MCV 92 02/19/2022    MCV 91.6 04/20/2018    MCH 28.4 02/19/2022    MCH 29.2 04/20/2018    MCHC 30.9 (L) 02/19/2022    MCHC 31.9 04/20/2018    RDW 14.0 02/19/2022    RDW 12.9  04/20/2018     02/19/2022     04/20/2018       BMP RESULTS:  Lab Results   Component Value Date     02/19/2022    .3 02/16/2022    POTASSIUM 4.0 02/19/2022    POTASSIUM 4.65 02/16/2022    CHLORIDE 114 (H) 02/19/2022    CHLORIDE 107.4 02/16/2022    CO2 23 02/19/2022    CO2 22.2 02/16/2022    ANIONGAP 6 02/19/2022     (H) 02/19/2022     (A) 02/16/2022    BUN 15 02/19/2022    BUN 12 02/16/2022    BUN 12.1 02/16/2022    CR 0.90 02/19/2022    CR 0.99 02/16/2022    GFRESTIMATED 68 02/19/2022    GFRESTIMATED 85 11/09/2016    JOSE L 8.9 02/19/2022    JOSE L 9.6 02/16/2022        A1C RESULTS:  No results found for: A1C    INR RESULTS:  Lab Results   Component Value Date    INR 1.13 02/17/2022    INR 1.7 02/19/2002    INR 2.2 01/22/2002       We greatly appreciate the opportunity to be involved in the care of your patient, Nataly Kee.    Sincerely,  Alphonso Martin MD      CC  Jose Thompson MD  [address] [place] [address]

## 2022-02-23 NOTE — LETTER
2/23/2022    RADHA Brown  [address]  [facility] [address]    RE: Nataly Kee       Dear Colleague,     I had the pleasure of seeing Nataly Kee in the [facility].  Nataly is a 71 year old who is being evaluated via a billable video visit.      How would you like to obtain your AVS? Mail a copy  If the video visit is dropped, the invitation should be resent by: Text to cell phone: [phone]  Will anyone else be joining your video visit? No        Review Of Systems  Skin: bruise to left arm from IV  Eyes: negative  Ears/Nose/Throat: negative  Respiratory: SOB   Cardiovascular:negative   Gastrointestinal:negative  Genitourinary: negative  Musculoskeletal: negative   Neurologic: negative  Psychiatric: negative   Hematologic/Lymphatic/Immunologic:   Endocrine: negative       Video Start Time: 10:30   Video  End Tme 10:45  Video-Visit Details    Type of service:  Video Visit    Video End Time:10:45    Originating Location (pt. Location): home    Distant Location (provider location):  [facility]     [facility]  HISTORY OF PRESENT ILLNESS:  Not quite back to baseline, but improving. Wants to resume exercise Questions about hereditary  Thrombophilia  Tolerating apixaban question about  Holding for  Dental  work    Orders this Visit:  No orders of the defined types were placed in this encounter.    No orders of the defined types were placed in this encounter.    There are no discontinued medications.    No diagnosis found.    CURRENT MEDICATIONS:  Current Outpatient Medications   Medication Sig Dispense Refill     apixaban ANTICOAGULANT (ELIQUIS) 5 MG tablet Take 2 tablets (10 mg) by mouth 2 times daily for 5 days 20 tablet 0     [START ON 2/25/2022] apixaban ANTICOAGULANT (ELIQUIS) 5 MG tablet Take 1 tablet (5 mg) by mouth 2 times daily 60 tablet 3     Cholecalciferol (VITAMIN D) 125 MCG (5000 UT)  capsule Take 1 capsule by mouth daily 2000 international unit(s)       FLUoxetine (PROZAC) 20 MG capsule TAKE 1 CAPSULE BY MOUTH EVERY DAY 90 capsule 3     omeprazole (PRILOSEC) 20 MG DR capsule Take 1 capsule (20 mg) by mouth daily 90 capsule 3       ALLERGIES     Allergies   Allergen Reactions     No Known Drug Allergies        PAST MEDICAL, SURGICAL, FAMILY, SOCIAL HISTORY:  History was reviewed and updated as needed, see medical record.    Review of Systems:  A 12-point review of systems was completed, see medical record for detailed review of systems information.    Physical Exam:  Vitals: LMP 09/03/2001     Constitutional:           Skin:           Head:           Eyes:           ENT:           Neck:           Chest:           Cardiac:                    Abdomen:           Vascular:                                        Extremities and Back:           Neurological:           ASSESSMENT: Stable. Will need follow up  TTE and clinic  Visit. Agree with hematology referral as this is second event and questionable family history of thrombophilia     RECOMMENDATIONS:   Primary care MD to refer to  Hematologist about thrombophilia work up  Continue  apixaban  Follow-up with me  With TTE  In 2 months  Exercise ok  Delay dental work for 3 months at least. Usually cleaning does not require interruption of anticoagulation, but if so,notfiy us first.      Recent Lab Results:  LIPID RESULTS:  Lab Results   Component Value Date    CHOL 232 (A) 02/16/2022    HDL 48 02/16/2022     (A) 02/16/2022     (H) 04/20/2018    TRIG 138 02/16/2022    CHOLHDLRATIO 5 02/16/2022       LIVER ENZYME RESULTS:  Lab Results   Component Value Date    AST 10 02/19/2022    AST 22 08/05/2013    ALT 11 02/19/2022    ALT 22 08/05/2013       CBC RESULTS:  Lab Results   Component Value Date    WBC 7.6 02/19/2022    WBC 6.3 04/20/2018    RBC 3.27 (L) 02/19/2022    RBC 5.10 04/20/2018    HGB 9.3 (L) 02/19/2022    HGB 14.9 04/20/2018    HCT  30.1 (L) 02/19/2022    HCT 46.7 04/20/2018    MCV 92 02/19/2022    MCV 91.6 04/20/2018    MCH 28.4 02/19/2022    MCH 29.2 04/20/2018    MCHC 30.9 (L) 02/19/2022    MCHC 31.9 04/20/2018    RDW 14.0 02/19/2022    RDW 12.9 04/20/2018     02/19/2022     04/20/2018       BMP RESULTS:  Lab Results   Component Value Date     02/19/2022    .3 02/16/2022    POTASSIUM 4.0 02/19/2022    POTASSIUM 4.65 02/16/2022    CHLORIDE 114 (H) 02/19/2022    CHLORIDE 107.4 02/16/2022    CO2 23 02/19/2022    CO2 22.2 02/16/2022    ANIONGAP 6 02/19/2022     (H) 02/19/2022     (A) 02/16/2022    BUN 15 02/19/2022    BUN 12 02/16/2022    BUN 12.1 02/16/2022    CR 0.90 02/19/2022    CR 0.99 02/16/2022    GFRESTIMATED 68 02/19/2022    GFRESTIMATED 85 11/09/2016    JOSE L 8.9 02/19/2022    JOSE L 9.6 02/16/2022        A1C RESULTS:  No results found for: A1C    INR RESULTS:  Lab Results   Component Value Date    INR 1.13 02/17/2022    INR 1.7 02/19/2002    INR 2.2 01/22/2002       We greatly appreciate the opportunity to be involved in the care of your patient, Nataly Kee.    Sincerely,  Alphonso Martin MD      CC  Jose Thompson MD  [address] [place] [id number]                                                                         Thank you for allowing me to participate in the care of your patient.      Sincerely,     Alphonso Martin MD     [facility]  cc:   Jose Thompson MD  [address] [place] [id number]

## 2022-02-23 NOTE — LETTER
2/23/2022      RE: Nataly Kee  [address] [facility] [id number]         Review Of Systems  Skin: bruise to left arm from IV  Eyes: negative  Ears/Nose/Throat: negative  Respiratory: SOB   Cardiovascular:negative   Gastrointestinal:negative  Genitourinary: negative  Musculoskeletal: negative   Neurologic: negative  Psychiatric: negative   Hematologic/Lymphatic/Immunologic:   Endocrine: negative       HISTORY OF PRESENT ILLNESS:  Not quite back to baseline, but improving. Wants to resume exercise Questions about hereditary  Thrombophilia  Tolerating apixaban question about  Holding for  Dental  work    Orders this Visit:  No orders of the defined types were placed in this encounter.    No orders of the defined types were placed in this encounter.    There are no discontinued medications.    No diagnosis found.    CURRENT MEDICATIONS:  Current Outpatient Medications   Medication Sig Dispense Refill     apixaban ANTICOAGULANT (ELIQUIS) 5 MG tablet Take 2 tablets (10 mg) by mouth 2 times daily for 5 days 20 tablet 0     [START ON 2/25/2022] apixaban ANTICOAGULANT (ELIQUIS) 5 MG tablet Take 1 tablet (5 mg) by mouth 2 times daily 60 tablet 3     Cholecalciferol (VITAMIN D) 125 MCG (5000 UT) capsule Take 1 capsule by mouth daily 2000 international unit(s)       FLUoxetine (PROZAC) 20 MG capsule TAKE 1 CAPSULE BY MOUTH EVERY DAY 90 capsule 3     omeprazole (PRILOSEC) 20 MG DR capsule Take 1 capsule (20 mg) by mouth daily 90 capsule 3       ALLERGIES     Allergies   Allergen Reactions     No Known Drug Allergies        PAST MEDICAL, SURGICAL, FAMILY, SOCIAL HISTORY:  History was reviewed and updated as needed, see medical record.    Review of Systems:  A 12-point review of systems was completed, see medical record for detailed review of systems information.    Physical Exam:  Vitals: LMP 09/03/2001     Constitutional:           Skin:           Head:           Eyes:           ENT:           Neck:           Chest:            Cardiac:                    Abdomen:           Vascular:                                        Extremities and Back:           Neurological:           ASSESSMENT: Stable. Will need follow up  TTE and clinic  Visit. Agree with hematology referral as this is second event and questionable family history of thrombophilia     RECOMMENDATIONS:   Primary care MD to refer to  Hematologist about thrombophilia work up  Continue  apixaban  Follow-up with me  With TTE  In 2 months  Exercise ok  Delay dental work for 3 months at least. Usually cleaning does not require interruption of anticoagulation, but if so,notfiy us first.      Recent Lab Results:  LIPID RESULTS:  Lab Results   Component Value Date    CHOL 232 (A) 02/16/2022    HDL 48 02/16/2022     (A) 02/16/2022     (H) 04/20/2018    TRIG 138 02/16/2022    CHOLHDLRATIO 5 02/16/2022       LIVER ENZYME RESULTS:  Lab Results   Component Value Date    AST 10 02/19/2022    AST 22 08/05/2013    ALT 11 02/19/2022    ALT 22 08/05/2013       CBC RESULTS:  Lab Results   Component Value Date    WBC 7.6 02/19/2022    WBC 6.3 04/20/2018    RBC 3.27 (L) 02/19/2022    RBC 5.10 04/20/2018    HGB 9.3 (L) 02/19/2022    HGB 14.9 04/20/2018    HCT 30.1 (L) 02/19/2022    HCT 46.7 04/20/2018    MCV 92 02/19/2022    MCV 91.6 04/20/2018    MCH 28.4 02/19/2022    MCH 29.2 04/20/2018    MCHC 30.9 (L) 02/19/2022    MCHC 31.9 04/20/2018    RDW 14.0 02/19/2022    RDW 12.9 04/20/2018     02/19/2022     04/20/2018       BMP RESULTS:  Lab Results   Component Value Date     02/19/2022    .3 02/16/2022    POTASSIUM 4.0 02/19/2022    POTASSIUM 4.65 02/16/2022    CHLORIDE 114 (H) 02/19/2022    CHLORIDE 107.4 02/16/2022    CO2 23 02/19/2022    CO2 22.2 02/16/2022    ANIONGAP 6 02/19/2022     (H) 02/19/2022     (A) 02/16/2022    BUN 15 02/19/2022    BUN 12 02/16/2022    BUN 12.1 02/16/2022    CR 0.90 02/19/2022    CR 0.99 02/16/2022     GFRESTIMATED 68 02/19/2022    GFRESTIMATED 85 11/09/2016    JOSE L 8.9 02/19/2022    JOSE L 9.6 02/16/2022        A1C RESULTS:  No results found for: A1C    INR RESULTS:  Lab Results   Component Value Date    INR 1.13 02/17/2022    INR 1.7 02/19/2002    INR 2.2 01/22/2002       We greatly appreciate the opportunity to be involved in the care of your patient, Nataly Kee.    Sincerely,  Alphonso Martin MD      CC  Jose Thompson MD  [address] [place] [id number]      Service Date: 02/23/2022    PRIMARY CARE PROVIDER:  Latesha Hilario PA-C    This is a telemetry visit for consultation.  The length of time was 50 minutes in clinic time with about half an hour of review of all pertinent medical records and history.  The location of the patient was at home.  Location of provider was at [facility].  The platform used was a [employer] platform.      HISTORY OF PRESENT ILLNESS:  Nataly Kee, a 71-year-old woman, was referred for a telemetry Cardiology Clinic visit to follow up a recent episode of acute pulmonary embolus complicated by acute cor pulmonale for recommendations regarding long-term management.    The patient presented to the [facility] Emergency Room on 02/16/2022 with a 10-day history of persistent dyspnea on exertion.  There had been no recent surgery, prolonged immobilization, or travel.  The patient denied hemoptysis or purulent sputum.  COVID testing was negative.  A CT scan demonstrated multiple bilateral pulmonary emboli beginning centrally in the distal right and left pulmonary arteries and extending into all lobar and proximal segmental branches with mild pulmonary dilatation.  An echocardiogram showed moderate right ventricular chamber enlargement with moderate reduction in right ventricular systolic performance.  There was no significant valvular stenosis or insufficiency, and left ventricular function was normal.    The patient was then transferred on an urgent  basis to the [facility], where she underwent successful bilateral pulmonary artery thrombectomy.  Doppler venous ultrasound examination of both lower extremities showed no evidence of deep venous thrombosis.  The patient was discharged on apixaban and instructed to follow up with Cardiology.    Although an in-person clinic visit had been planned, the patient's car malfunctioned and she requested a Doximity telemetry visit.    Presently, Ms. Kee is quite comfortable and states that although her breathing is not back to normal, she has improved significantly.  She has been compliant with medical therapy and denies hemoptysis, pleuritic chest pain, or worsening dyspnea.  She has a previous history of pulmonary embolus that occurred after a hysterectomy several years ago.  There is a child and her family tree that had a thrombophilic disorder that required digital amputation.  She wonders whether she should undergo a formal Hematology evaluation.  The patient would like to resume her regular exercise program.  She has noted no bleeding complications or unusual swelling.  She questions whether she can undergo dental cleaning.    PAST MEDICAL HISTORY:    1.  Recurrent pulmonary embolus -- currently on apixaban.  Status post recent thrombectomy.  2.  Depression.  3.  Gastroesophageal reflux disease.  4.  Old history of vaginal hysterectomy.  5.  History of removal of colonic polyps.    ALLERGIES:  None known.    HABITS:  The patient does not abuse tobacco, alcohol or illicit substances.  She did smoke several years ago but stopped in 1975.  She uses alcohol rarely.    FAMILY HISTORY:  There is a history of coronary disease in her mother, cancer in her mother, cerebrovascular disease in her mother, her father had coronary disease.  Her father had emphysema.  Sister has multiple sclerosis.  She reports that there is 1 grandchild that has had a thrombophilic disorder that required hospitalization  at [facility] and even digital amputations.  There has been no formal workup for thrombophilia in the past.    MEDICATIONS:    1.  Apixaban 5 b.i.d.  2.  Fluoxetine 20 mg daily.  3.  Omeprazole.    REVIEW OF SYSTEMS:  A 12-point review of systems was performed.  Outside the issues of some mild exertional dyspnea, there are no other complaints.    PHYSICAL EXAMINATION:  Telemetry exam today demonstrates a very pleasant, cooperative 71-year-old woman who appears comfortable at rest.  We could not perform any vital sign measurements.  She does not appear to be dyspneic.  She appears to be comfortable.    LABORATORY STUDIES:  I reviewed her most recent hospitalization including her CT scans and her echo test.    ASSESSMENT:  This 71-year-old woman suffered a recent unprovoked pulmonary embolus.  Since this is her second event and there is a family history of thrombophilia in 1 grandchild, I would strongly advise referral to a hematologist to assess whether she needs further testing for a thrombophilic disorder.  I would also like comment from the hematologist as to whether she will require long-term anticoagulation or short-term anticoagulation.  It appears she may be a candidate for long-term apixaban.    In the meantime, we will plan to recheck her right ventricular systolic performance and chamber size to make certain she is back to normal.  We will also check pulmonary pressures at that time.    I see no contraindication to her returning to her usual exercise program.  Most dental cleaning can be performed while the patient remains on anticoagulation.  If that is not the case, I have asked her to have her dentist call our office for recommendations.    RECOMMENDATIONS:    1.  We will plan a followup visit with me with an echocardiogram in about 2 months to make certain her right ventricular function has returned back to normal.  She has been asked to contact us if she remains dyspneic prior to that  time.  2.  I would recommend that you arrange formal consultation with a hematologist to screen for thromboembolic disorders and to provide recommendations regarding the length of anticoagulation therapy.  3.  The patient should be able to undergo a dental cleaning without discontinuation of anticoagulation.  I have asked her to have her dentist call our office if that is not the case.  4.  She should be able to resume her regular exercise program without limitations.  She understands the need to avoid any potential sports activities that could put her at risk for cranial trauma.    We greatly appreciate the opportunity to help in the care of this patient.      Alphonso Martin MD      cc:  Latesha Hilario PA-C  [place],  [place] [address]        D: 2022   T: 2022   MT: juanita    Name:     JENNIFER RIOS  MRN:      [id number]        Account:      [id number]   :      1950           Service Date: 2022     Document: [id number]

## 2022-02-23 NOTE — PROGRESS NOTES
Service Date: 02/23/2022    PRIMARY CARE PROVIDER:  Latesha Hilario PA-C    This is a telemetry visit for consultation.  The length of time was 50 minutes in clinic time with about half an hour of review of all pertinent medical records and history.  The location of the patient was at home.  Location of provider was at [facility].  The platform used was a Get[employer] platform.      HISTORY OF PRESENT ILLNESS:  Nataly Kee, a 71-year-old woman, was referred for a telemetry Cardiology Clinic visit to follow up a recent episode of acute pulmonary embolus complicated by acute cor pulmonale for recommendations regarding long-term management.    The patient presented to the [facility] Emergency Room on 02/16/2022 with a 10-day history of persistent dyspnea on exertion.  There had been no recent surgery, prolonged immobilization, or travel.  The patient denied hemoptysis or purulent sputum.  COVID testing was negative.  A CT scan demonstrated multiple bilateral pulmonary emboli beginning centrally in the distal right and left pulmonary arteries and extending into all lobar and proximal segmental branches with mild pulmonary dilatation.  An echocardiogram showed moderate right ventricular chamber enlargement with moderate reduction in right ventricular systolic performance.  There was no significant valvular stenosis or insufficiency, and left ventricular function was normal.    The patient was then transferred on an urgent basis to the [facility], where she underwent successful bilateral pulmonary artery thrombectomy.  Doppler venous ultrasound examination of both lower extremities showed no evidence of deep venous thrombosis.  The patient was discharged on apixaban and instructed to follow up with Cardiology.    Although an in-person clinic visit had been planned, the patient's car malfunctioned and she requested a Doximity telemetry visit.    Presently, Ms. Kee is quite  comfortable and states that although her breathing is not back to normal, she has improved significantly.  She has been compliant with medical therapy and denies hemoptysis, pleuritic chest pain, or worsening dyspnea.  She has a previous history of pulmonary embolus that occurred after a hysterectomy several years ago.  There is a child and her family tree that had a thrombophilic disorder that required digital amputation.  She wonders whether she should undergo a formal Hematology evaluation.  The patient would like to resume her regular exercise program.  She has noted no bleeding complications or unusual swelling.  She questions whether she can undergo dental cleaning.    PAST MEDICAL HISTORY:    1.  Recurrent pulmonary embolus -- currently on apixaban.  Status post recent thrombectomy.  2.  Depression.  3.  Gastroesophageal reflux disease.  4.  Old history of vaginal hysterectomy.  5.  History of removal of colonic polyps.    ALLERGIES:  None known.    HABITS:  The patient does not abuse tobacco, alcohol or illicit substances.  She did smoke several years ago but stopped in 1975.  She uses alcohol rarely.    FAMILY HISTORY:  There is a history of coronary disease in her mother, cancer in her mother, cerebrovascular disease in her mother, her father had coronary disease.  Her father had emphysema.  Sister has multiple sclerosis.  She reports that there is 1 grandchild that has had a thrombophilic disorder that required hospitalization at [facility] and even digital amputations.  There has been no formal workup for thrombophilia in the past.    MEDICATIONS:    1.  Apixaban 5 b.i.d.  2.  Fluoxetine 20 mg daily.  3.  Omeprazole.    REVIEW OF SYSTEMS:  A 12-point review of systems was performed.  Outside the issues of some mild exertional dyspnea, there are no other complaints.    PHYSICAL EXAMINATION:  Telemetry exam today demonstrates a very pleasant, cooperative 71-year-old woman who appears comfortable  at rest.  We could not perform any vital sign measurements.  She does not appear to be dyspneic.  She appears to be comfortable.    LABORATORY STUDIES:  I reviewed her most recent hospitalization including her CT scans and her echo test.    ASSESSMENT:  This 71-year-old woman suffered a recent unprovoked pulmonary embolus.  Since this is her second event and there is a family history of thrombophilia in 1 grandchild, I would strongly advise referral to a hematologist to assess whether she needs further testing for a thrombophilic disorder.  I would also like comment from the hematologist as to whether she will require long-term anticoagulation or short-term anticoagulation.  It appears she may be a candidate for long-term apixaban.    In the meantime, we will plan to recheck her right ventricular systolic performance and chamber size to make certain she is back to normal.  We will also check pulmonary pressures at that time.    I see no contraindication to her returning to her usual exercise program.  Most dental cleaning can be performed while the patient remains on anticoagulation.  If that is not the case, I have asked her to have her dentist call our office for recommendations.    RECOMMENDATIONS:    1.  We will plan a followup visit with me with an echocardiogram in about 2 months to make certain her right ventricular function has returned back to normal.  She has been asked to contact us if she remains dyspneic prior to that time.  2.  I would recommend that you arrange formal consultation with a hematologist to screen for thromboembolic disorders and to provide recommendations regarding the length of anticoagulation therapy.  3.  The patient should be able to undergo a dental cleaning without discontinuation of anticoagulation.  I have asked her to have her dentist call our office if that is not the case.  4.  She should be able to resume her regular exercise program without limitations.  She understands the  need to avoid any potential sports activities that could put her at risk for cranial trauma.    We greatly appreciate the opportunity to help in the care of this patient.      Alphonso Martin MD    cc:  Latesha Hilario PA-C  [facility],  [place] [address]    Alphonso Martin MD        D: 2022   T: 2022   MT: [email or web address]    Name:     JENNIFER RIOS  MRN:      [id number]        Account:      [id number]   :      1950           Service Date: 2022       Document: [id number]

## 2022-02-24 ENCOUNTER — CARE COORDINATION (OUTPATIENT)
Dept: FAMILY MEDICINE | Facility: CLINIC | Age: 72
End: 2022-02-24

## 2022-03-02 ENCOUNTER — OFFICE VISIT (OUTPATIENT)
Dept: FAMILY MEDICINE | Facility: CLINIC | Age: 72
End: 2022-03-02

## 2022-03-02 VITALS
TEMPERATURE: 97 F | DIASTOLIC BLOOD PRESSURE: 64 MMHG | BODY MASS INDEX: 30.92 KG/M2 | HEIGHT: 64 IN | HEART RATE: 96 BPM | WEIGHT: 181.1 LBS | RESPIRATION RATE: 20 BRPM | SYSTOLIC BLOOD PRESSURE: 102 MMHG

## 2022-03-02 DIAGNOSIS — D62 ANEMIA DUE TO BLOOD LOSS, ACUTE: ICD-10-CM

## 2022-03-02 DIAGNOSIS — I26.92 ACUTE SADDLE PULMONARY EMBOLISM WITHOUT ACUTE COR PULMONALE (H): Primary | ICD-10-CM

## 2022-03-02 DIAGNOSIS — E03.8 SUBCLINICAL HYPOTHYROIDISM: ICD-10-CM

## 2022-03-02 LAB
ALBUMIN SERPL-MCNC: 4.1 G/DL (ref 3.6–5.1)
ALBUMIN/GLOB SERPL: 1.5 {RATIO} (ref 1–2.5)
ALP SERPL-CCNC: 71 U/L (ref 33–130)
ALT 1742-6: 9 U/L (ref 0–32)
AST 1920-8: 9 U/L (ref 0–35)
BILIRUB SERPL-MCNC: 0.7 MG/DL (ref 0.2–1.2)
BUN SERPL-MCNC: 22 MG/DL (ref 7–25)
BUN/CREATININE RATIO: 20.2 (ref 6–22)
CALCIUM SERPL-MCNC: 10 MG/DL (ref 8.6–10.3)
CHLORIDE SERPLBLD-SCNC: 107.6 MMOL/L (ref 98–110)
CO2 SERPL-SCNC: 29.9 MMOL/L (ref 20–32)
CREAT SERPL-MCNC: 1.09 MG/DL (ref 0.6–1.3)
ERYTHROCYTE [DISTWIDTH] IN BLOOD BY AUTOMATED COUNT: 13 %
GLOBULIN, CALCULATED - QUEST: 2.8 (ref 1.9–3.7)
GLUCOSE SERPL-MCNC: 97 MG/DL (ref 60–99)
HCT VFR BLD AUTO: 33 % (ref 35–47)
HEMOGLOBIN: 10.6 G/DL (ref 11.7–15.7)
MCH RBC QN AUTO: 28 PG (ref 26–33)
MCHC RBC AUTO-ENTMCNC: 32.1 G/DL (ref 31–36)
MCV RBC AUTO: 87.4 FL (ref 78–100)
PLATELET COUNT - QUEST: 428 10^9/L (ref 150–375)
POTASSIUM SERPL-SCNC: 4.76 MMOL/L (ref 3.5–5.3)
PROT SERPL-MCNC: 6.9 G/DL (ref 6.1–8.1)
RBC # BLD AUTO: 3.78 10*12/L (ref 3.8–5.2)
SODIUM SERPL-SCNC: 140.9 MMOL/L (ref 135–146)
WBC # BLD AUTO: 6.5 10*9/L (ref 4–11)

## 2022-03-02 PROCEDURE — 85027 COMPLETE CBC AUTOMATED: CPT | Performed by: PHYSICIAN ASSISTANT

## 2022-03-02 PROCEDURE — 36415 COLL VENOUS BLD VENIPUNCTURE: CPT | Performed by: PHYSICIAN ASSISTANT

## 2022-03-02 PROCEDURE — 80053 COMPREHEN METABOLIC PANEL: CPT | Performed by: PHYSICIAN ASSISTANT

## 2022-03-02 PROCEDURE — 99214 OFFICE O/P EST MOD 30 MIN: CPT | Performed by: PHYSICIAN ASSISTANT

## 2022-03-02 RX ORDER — FERROUS SULFATE 325(65) MG
325 TABLET, DELAYED RELEASE (ENTERIC COATED) ORAL DAILY
Qty: 90 TABLET | Refills: 0 | Status: SHIPPED | OUTPATIENT
Start: 2022-03-02 | End: 2022-05-25

## 2022-03-02 NOTE — PATIENT INSTRUCTIONS
Continue on Eliquis twice daily    Schedule with hematology    [facility]  [address]  [place] [address] [phone]

## 2022-03-02 NOTE — NURSING NOTE
Nataly Kee is here for a hospital follow up and also a few questions.   Still have clots on her lung?   Will she need an antibiotic before her dental cleaning?    Questioned patient about current smoking habits.  Pt. quit smoking some time ago.  PULSE regular  My Chart: active  CLASSIFICATION OF OVERWEIGHT AND OBESITY BY BMI                        Obesity Class           BMI(kg/m2)  Underweight                                    < 18.5  Normal                                         18.5-24.9  Overweight                                     25.0-29.9  OBESITY                     I                  30.0-34.9                             II                 35.0-39.9  EXTREME OBESITY             III                >40                            Patient's  BMI Body mass index is 31.09 kg/m .  http://hin.nhlbi.nih.gov/menuplanner/menu.cgi  Pre-visit planning  Immunizations - up to date  Colonoscopy - is up to date  Mammogram - is up to date  Asthma -   PHQ9 -    DENTON-7 -

## 2022-03-02 NOTE — PROGRESS NOTES
Assessment & Plan     Acute saddle pulmonary embolism without acute cor pulmonale (H)    - Oncology/Hematology Adult Referral  - VENOUS COLLECTION  - Hemogram with Platelets ([facility])  - Comprehensive Metobolic Panel ([facility])  - IRON AND IRON BINDING CAPACITY ([facility])  - FERRITIN ([facility])    Anemia due to blood loss, acute  Sent in iron  Recheck lab only 1 month iron  - VENOUS COLLECTION  - Hemogram with Platelets ([facility])  - Comprehensive Metobolic Panel ([facility])  - IRON AND IRON BINDING CAPACITY ([facility])  - FERRITIN ([facility])      Continue on Eliqius bid   See hematologist      Latesha Hilario, PA  [facility]    Russ Ingram is a 71 year old who presents for the following health issues     HPI       Hospital Follow-up Visit:    Hospital/Nursing Home/ Rehab Facility: [facility]  Date of Admission: 2/16/22  Date of Discharge: 2/19/22  Reason(s) for Admission: Acute PE      Was your hospitalization related to COVID-19? No   Problems taking medications regularly:  None  Medication changes since discharge: Started Eliquis  Problems adhering to non-medication therapy:  None    Summary of hospitalization:  [facility] discharge summary reviewed  Diagnostic Tests/Treatments reviewed.  Follow up needed: none  Other Healthcare Providers Involved in Patient s Care:         None  Update since discharge: improved.   Cardiology consult on Wednesday - virtual  Appt in April     Echo in April advised.     Feeling well.  Breathing better    THIS IS HER 2ND PE  1ST PE AFTER HYSTERECTOMY    I DID ORDER COVID ABs, HER PCR AND home tests were negative  ANTIBODIES POSITIVE - I DO THINK SHE HAD COVID 3 WEEKS AGO  THIS WOULD BE ANOTHER PROVOKED PE    I will defer to Hematology to decide whether lifelong anticoag is indicated.         Post Discharge Medication Reconciliation: discharge medications reconciled, continue medications without change.  Plan of care  "communicated with patient                Objective    /64 (BP Location: Right arm, Patient Position: Chair, Cuff Size: Adult Regular)   Pulse 96   Temp 97  F (36.1  C) (Temporal)   Resp 20   Ht 1.626 m (5' 4\")   Wt 82.1 kg (181 lb 1.6 oz)   LMP 09/03/2001   BMI 31.09 kg/m    Body mass index is 31.09 kg/m .  Physical Exam   GENERAL: healthy, alert and no distress  RESP: lungs clear to auscultation - no rales, rhonchi or wheezes  CV: regular rate and rhythm, normal S1 S2, no S3 or S4, no murmur, click or rub, no peripheral edema and peripheral pulses strong                    "

## 2022-03-03 LAB
% SATURATION - QUEST: 5 % (CALC) (ref 16–45)
FERRITIN SERPL-MCNC: 7 NG/ML (ref 16–288)
IRON: 24 MCG/DL (ref 45–160)
TIBC - QUEST: 449 MCG/DL (CALC) (ref 250–450)

## 2022-03-10 ENCOUNTER — TRANSFERRED RECORDS (OUTPATIENT)
Dept: FAMILY MEDICINE | Facility: CLINIC | Age: 72
End: 2022-03-10

## 2022-03-30 ENCOUNTER — LAB (OUTPATIENT)
Dept: LAB | Facility: CLINIC | Age: 72
End: 2022-03-30
Payer: MEDICARE

## 2022-03-30 DIAGNOSIS — D62 ANEMIA DUE TO ACUTE BLOOD LOSS: Primary | ICD-10-CM

## 2022-03-30 LAB
ERYTHROCYTE [DISTWIDTH] IN BLOOD BY AUTOMATED COUNT: 16 % (ref 10–15)
HCT VFR BLD AUTO: 35.1 % (ref 35–47)
HGB BLD-MCNC: 10.5 G/DL (ref 11.7–15.7)
MCH RBC QN AUTO: 24.8 PG (ref 26.5–33)
MCHC RBC AUTO-ENTMCNC: 29.9 G/DL (ref 31.5–36.5)
MCV RBC AUTO: 83 FL (ref 78–100)
PLATELET # BLD AUTO: 369 10E3/UL (ref 150–450)
RBC # BLD AUTO: 4.23 10E6/UL (ref 3.8–5.2)
WBC # BLD AUTO: 5.2 10E3/UL (ref 4–11)

## 2022-03-30 PROCEDURE — 85027 COMPLETE CBC AUTOMATED: CPT

## 2022-03-30 PROCEDURE — 36415 COLL VENOUS BLD VENIPUNCTURE: CPT

## 2022-04-06 ENCOUNTER — MYC MEDICAL ADVICE (OUTPATIENT)
Dept: FAMILY MEDICINE | Facility: CLINIC | Age: 72
End: 2022-04-06

## 2022-04-09 ENCOUNTER — HEALTH MAINTENANCE LETTER (OUTPATIENT)
Age: 72
End: 2022-04-09

## 2022-04-15 ENCOUNTER — MYC MEDICAL ADVICE (OUTPATIENT)
Dept: FAMILY MEDICINE | Facility: CLINIC | Age: 72
End: 2022-04-15

## 2022-04-19 NOTE — TELEPHONE ENCOUNTER
Called Dr. Martin's office  Nurse will relay new Hgb to Dr. Martin and someone from her office will call pt to discuss further evaluation    Latesha Hilario PA-C  4/19/2022

## 2022-04-21 ENCOUNTER — OFFICE VISIT (OUTPATIENT)
Dept: CARDIOLOGY | Facility: CLINIC | Age: 72
End: 2022-04-21
Payer: MEDICARE

## 2022-04-21 ENCOUNTER — HOSPITAL ENCOUNTER (OUTPATIENT)
Dept: CARDIOLOGY | Facility: CLINIC | Age: 72
Discharge: HOME | End: 2022-04-21
Attending: INTERNAL MEDICINE | Admitting: INTERNAL MEDICINE
Payer: MEDICARE

## 2022-04-21 VITALS
SYSTOLIC BLOOD PRESSURE: 122 MMHG | BODY MASS INDEX: 31.94 KG/M2 | WEIGHT: 187.1 LBS | HEART RATE: 90 BPM | DIASTOLIC BLOOD PRESSURE: 70 MMHG | OXYGEN SATURATION: 95 % | HEIGHT: 64 IN

## 2022-04-21 DIAGNOSIS — E78.5 DYSLIPIDEMIA: ICD-10-CM

## 2022-04-21 DIAGNOSIS — I26.09 ACUTE PULMONARY EMBOLISM WITH ACUTE COR PULMONALE, UNSPECIFIED PULMONARY EMBOLISM TYPE (H): ICD-10-CM

## 2022-04-21 DIAGNOSIS — R07.89 CHEST PRESSURE: Primary | ICD-10-CM

## 2022-04-21 LAB — LVEF ECHO: NORMAL

## 2022-04-21 PROCEDURE — 93325 DOPPLER ECHO COLOR FLOW MAPG: CPT | Mod: 26 | Performed by: INTERNAL MEDICINE

## 2022-04-21 PROCEDURE — 99214 OFFICE O/P EST MOD 30 MIN: CPT | Performed by: INTERNAL MEDICINE

## 2022-04-21 PROCEDURE — 93308 TTE F-UP OR LMTD: CPT | Mod: 26 | Performed by: INTERNAL MEDICINE

## 2022-04-21 PROCEDURE — 93321 DOPPLER ECHO F-UP/LMTD STD: CPT | Mod: 26 | Performed by: INTERNAL MEDICINE

## 2022-04-21 PROCEDURE — 93325 DOPPLER ECHO COLOR FLOW MAPG: CPT

## 2022-04-21 PROCEDURE — 93000 ELECTROCARDIOGRAM COMPLETE: CPT | Performed by: INTERNAL MEDICINE

## 2022-04-21 NOTE — PROGRESS NOTES
"HISTORY OF PRESENT ILLNESS:  Breathing almost back to normal. RV looks good on TTE. Moe says long term anticoagulant.Epsiode of chest pressure this am has had  This  In past. TTE stable.     Orders this Visit:  No orders of the defined types were placed in this encounter.    No orders of the defined types were placed in this encounter.    There are no discontinued medications.    Encounter Diagnosis   Name Primary?     Acute pulmonary embolism with acute cor pulmonale, unspecified pulmonary embolism type (H)        CURRENT MEDICATIONS:  Current Outpatient Medications   Medication Sig Dispense Refill     apixaban ANTICOAGULANT (ELIQUIS) 5 MG tablet Take 1 tablet (5 mg) by mouth 2 times daily 60 tablet 3     Cholecalciferol (VITAMIN D) 125 MCG (5000 UT) capsule Take 1 capsule by mouth daily 2000 international unit(s)       ferrous sulfate (FE TABS) 325 (65 Fe) MG EC tablet Take 1 tablet (325 mg) by mouth daily 90 tablet 0     FLUoxetine (PROZAC) 20 MG capsule TAKE 1 CAPSULE BY MOUTH EVERY DAY 90 capsule 3     omeprazole (PRILOSEC) 20 MG DR capsule Take 1 capsule (20 mg) by mouth daily 90 capsule 3       ALLERGIES     Allergies   Allergen Reactions     No Known Drug Allergies        PAST MEDICAL, SURGICAL, FAMILY, SOCIAL HISTORY:  History was reviewed and updated as needed, see medical record.    Review of Systems:  A 12-point review of systems was completed, see medical record for detailed review of systems information.    Physical Exam:  Vitals: /70 (BP Location: Right arm, Patient Position: Sitting, Cuff Size: Adult Regular)   Pulse 90   Ht 1.626 m (5' 4\")   Wt 84.9 kg (187 lb 1.6 oz)   LMP 09/03/2001   SpO2 95%   Breastfeeding No   BMI 32.12 kg/m      Constitutional:           Skin:           Head:           Eyes:           ENT:           Neck:           Chest:           Cardiac:                    Abdomen:           Vascular:                                        Extremities and Back:       "     Neurological:           ASSESSMENT: stable recovered.         RECOMMENDATIONS:  Lifestyle intervention  Would  Favor statin  Life long  Anticoagulation  Stress echocardiogram      Recent Lab Results:  LIPID RESULTS:  Lab Results   Component Value Date    CHOL 232 (A) 02/16/2022    HDL 48 02/16/2022     (A) 02/16/2022     (H) 04/20/2018    TRIG 138 02/16/2022    CHOLHDLRATIO 5 02/16/2022       LIVER ENZYME RESULTS:  Lab Results   Component Value Date    AST 10 02/19/2022    AST 22 08/05/2013    ALT 11 02/19/2022    ALT 22 08/05/2013       CBC RESULTS:  Lab Results   Component Value Date    WBC 5.2 03/30/2022    WBC 6.5 03/02/2022    RBC 4.23 03/30/2022    RBC 3.78 (A) 03/02/2022    HGB 10.5 (L) 03/30/2022    HGB 10.6 (A) 03/02/2022    HCT 35.1 03/30/2022    HCT 33.0 (A) 03/02/2022    MCV 83 03/30/2022    MCV 87.4 03/02/2022    MCH 24.8 (L) 03/30/2022    MCH 28.0 03/02/2022    MCHC 29.9 (L) 03/30/2022    MCHC 32.1 03/02/2022    RDW 16.0 (H) 03/30/2022    RDW 13.0 03/02/2022     03/30/2022     (A) 03/02/2022       BMP RESULTS:  Lab Results   Component Value Date    .9 03/02/2022    POTASSIUM 4.76 03/02/2022    CHLORIDE 107.6 03/02/2022    CO2 29.9 03/02/2022    ANIONGAP 6 02/19/2022    GLC 97 03/02/2022    BUN 22 03/02/2022    BUN 20.2 03/02/2022    CR 1.09 03/02/2022    GFRESTIMATED 68 02/19/2022    GFRESTIMATED 85 11/09/2016    JOSE L 10.0 03/02/2022        A1C RESULTS:  No results found for: A1C    INR RESULTS:  Lab Results   Component Value Date    INR 1.13 02/17/2022    INR 1.7 02/19/2002    INR 2.2 01/22/2002       We greatly appreciate the opportunity to be involved in the care of your patient, Nataly Kee.    Sincerely,  Alphonso Martin MD      CC  No referring provider defined for this encounter.

## 2022-04-21 NOTE — LETTER
"4/21/2022    Latesha Hilario, PA  [address]  [facility] [address]    RE: Nataly Kee       Dear Colleague,     I had the pleasure of seeing Nataly Kee in the [facility].  HISTORY OF PRESENT ILLNESS:  Breathing almost back to normal. RV looks good on TTE. Heme says long term anticoagulant.Epsiode of chest pressure this am has had  This  In past. TTE stable.     Orders this Visit:  No orders of the defined types were placed in this encounter.    No orders of the defined types were placed in this encounter.    There are no discontinued medications.    Encounter Diagnosis   Name Primary?     Acute pulmonary embolism with acute cor pulmonale, unspecified pulmonary embolism type (H)        CURRENT MEDICATIONS:  Current Outpatient Medications   Medication Sig Dispense Refill     apixaban ANTICOAGULANT (ELIQUIS) 5 MG tablet Take 1 tablet (5 mg) by mouth 2 times daily 60 tablet 3     Cholecalciferol (VITAMIN D) 125 MCG (5000 UT) capsule Take 1 capsule by mouth daily 2000 international unit(s)       ferrous sulfate (FE TABS) 325 (65 Fe) MG EC tablet Take 1 tablet (325 mg) by mouth daily 90 tablet 0     FLUoxetine (PROZAC) 20 MG capsule TAKE 1 CAPSULE BY MOUTH EVERY DAY 90 capsule 3     omeprazole (PRILOSEC) 20 MG DR capsule Take 1 capsule (20 mg) by mouth daily 90 capsule 3       ALLERGIES     Allergies   Allergen Reactions     No Known Drug Allergies        PAST MEDICAL, SURGICAL, FAMILY, SOCIAL HISTORY:  History was reviewed and updated as needed, see medical record.    Review of Systems:  A 12-point review of systems was completed, see medical record for detailed review of systems information.    Physical Exam:  Vitals: /70 (BP Location: Right arm, Patient Position: Sitting, Cuff Size: Adult Regular)   Pulse 90   Ht 1.626 m (5' 4\")   Wt 84.9 kg (187 lb 1.6 oz)   LMP 09/03/2001   SpO2 95%   Breastfeeding No   BMI 32.12 kg/m      Constitutional:           Skin:     "       Head:           Eyes:           ENT:           Neck:           Chest:           Cardiac:                    Abdomen:           Vascular:                                        Extremities and Back:           Neurological:           ASSESSMENT: stable recovered.         RECOMMENDATIONS:  Lifestyle intervention  Would  Favor statin  Life long  Anticoagulation  Stress echocardiogram      Recent Lab Results:  LIPID RESULTS:  Lab Results   Component Value Date    CHOL 232 (A) 02/16/2022    HDL 48 02/16/2022     (A) 02/16/2022     (H) 04/20/2018    TRIG 138 02/16/2022    CHOLHDLRATIO 5 02/16/2022       LIVER ENZYME RESULTS:  Lab Results   Component Value Date    AST 10 02/19/2022    AST 22 08/05/2013    ALT 11 02/19/2022    ALT 22 08/05/2013       CBC RESULTS:  Lab Results   Component Value Date    WBC 5.2 03/30/2022    WBC 6.5 03/02/2022    RBC 4.23 03/30/2022    RBC 3.78 (A) 03/02/2022    HGB 10.5 (L) 03/30/2022    HGB 10.6 (A) 03/02/2022    HCT 35.1 03/30/2022    HCT 33.0 (A) 03/02/2022    MCV 83 03/30/2022    MCV 87.4 03/02/2022    MCH 24.8 (L) 03/30/2022    MCH 28.0 03/02/2022    MCHC 29.9 (L) 03/30/2022    MCHC 32.1 03/02/2022    RDW 16.0 (H) 03/30/2022    RDW 13.0 03/02/2022     03/30/2022     (A) 03/02/2022       BMP RESULTS:  Lab Results   Component Value Date    .9 03/02/2022    POTASSIUM 4.76 03/02/2022    CHLORIDE 107.6 03/02/2022    CO2 29.9 03/02/2022    ANIONGAP 6 02/19/2022    GLC 97 03/02/2022    BUN 22 03/02/2022    BUN 20.2 03/02/2022    CR 1.09 03/02/2022    GFRESTIMATED 68 02/19/2022    GFRESTIMATED 85 11/09/2016    JOSE L 10.0 03/02/2022        A1C RESULTS:  No results found for: A1C    INR RESULTS:  Lab Results   Component Value Date    INR 1.13 02/17/2022    INR 1.7 02/19/2002    INR 2.2 01/22/2002       We greatly appreciate the opportunity to be involved in the care of your patient, Nataly Kee.    Sincerely,  Alphonso Martin MD      CC  No referring  provider defined for this encounter.                                                                       Service Date: 04/21/2022    PRIMARY CARE PROVIDER:  RADHA Gilliam    HISTORY OF PRESENT ILLNESS:  Nataly Kee, a 71-year-old woman with recent acute pulmonary embolus complicated by cor pulmonale, obesity, and dyslipidemia was seen today at your request for followup.    The patient presented to the [facility] Emergency Room on 02/16/2022 with a 10-day history of persistent exertional dyspnea.  A CT scan showed multiple bilateral pulmonary emboli beginning centrally and extending into the distal right and left pulmonary arteries and all lobar and proximal segmental branches.  The patient's echo showed right ventricular enlargement with a reduction in right ventricular systolic performance.  Left ventricular chamber size and wall motion were normal.    The patient was then transferred to the [facility], where she underwent bilateral pulmonary artery thrombectomy with excellent results.  Deep venous ultrasound showed no evidence of deep venous thrombosis.    We had planned an in-person visit, but the patient's car malfunctioned and she rescheduled for a Doximity telemetry visit.  I had advised formal consultation with Hematology as this was her second episode of pulmonary embolus, and we could identify no definite triggering pathology.  The patient's COVID testing has been negative, but she previously did receive the Moderna vaccine.    The patient reported a history of coronary disease in her mother, but she had no history of hypertension, diabetes mellitus, cigarette smoking or previous MI.    Since I saw her last, Ms. Kee has seen Dr. Martin and a formal evaluation for thrombophilia has been advised.  In the meantime, it appears she will require lifelong anticoagulation with apixaban.    The patient's breathing is back to normal.    As she was about to leave the office  today, she casually mentioned she had an episode of chest pressure earlier this morning.  Her ECG did not show any acute changes, but she has what appears to be a sinus tachycardia with an unusual P-wave axis.  The DE interval is not short, however.    PHYSICAL EXAMINATION:    GENERAL:  Exam today demonstrates a very pleasant and cooperative 71-year-old woman.  VITAL SIGNS:  Her blood pressure was 122/70, heart rate is 90.  Her height is 1.63 meters, her weight is 84.5 kilograms, her BMI is 32.  RESPIRATORY:  Her lungs are clear to percussion and auscultation.  CARDIOVASCULAR:  Shows a normal S1 with a normal S2.  There is no S3.  There is no murmur, rub or click.  Her pulses are full and symmetrical.    MEDICATIONS:   1.  Apixaban 5 mg b.i.d.  2.  Fluoxetine 20 mg every day.  3.  Ferrous sulfate.  4.  Omeprazole 20 daily.    LABORATORY STUDIES:  I have personally reviewed her echo, both right and left ventricular chamber size, wall motion and systolic performance are normal.  Her ECG shows what appears to be a sinus tachycardia with an unusual P-wave axis.  She was quite nervous while her ECG was performed.    Her cholesterol level was 232 and HDL 48.    ASSESSMENT:  I am pleased that the patient has recovered from her acute pulmonary thromboembolus.  Right ventricular chamber size and systolic performance are normal.  I agree with Dr. Martin that she will likely require lifelong anticoagulation.    I am uncertain as to the cause of the episode of chest pressure she had earlier today.  Her echo and ECG did not show any acute changes.  In view of her dyslipidemia, I favor formal testing with a stress echocardiogram to make certain there is no evidence of ischemia.  I have instructed her to come to the Emergency Room immediately should she have any prolonged episodes of chest pressure.    I performed an American College of Cardiology risk assessment today for the patient.  Her risk of adverse vascular event over the  next 10 years is about 25%, and the American College of Cardiology would clearly favor statin therapy in addition to lifestyle intervention.  The patient is hesitant to take a statin at this time but will discuss with her primary care doctors.    RECOMMENDATIONS:   1.  Mediterranean-style weight loss diet.  2.  Regular exercise program.  3.  Stress echocardiogram to evaluate episode of atypical chest pressure.  4.  Likely will require long-term anticoagulation.  5.  I would favor statin therapy.  Either atorvastatin 40 daily or rosuvastatin 20 daily.  The patient has not yet decided whether she would agree to a statin.    We will contact you with the results of the patient's stress echo.    We appreciate the opportunity to help care for your patient, Jennifer Rios.    Alphonso Martin MD    cc:  Alexx Martin MD  [facility]  [address], [address]  [place] [address]    Latesha Hilario PA-C  [facility],  [place] [address]    Alphonso Martin MD        D: 2022   T: 2022   MT:     Name:     JENNIFER RIOS  MRN:      [id number]        Account:      [id number]   :      1950           Service Date: 2022       Document: [id number]    Thank you for allowing me to participate in the care of your patient.      Sincerely,     Alphonso Martin MD     [facility]  cc:   No referring provider defined for this encounter.

## 2022-04-22 NOTE — PROGRESS NOTES
Service Date: 04/21/2022    PRIMARY CARE PROVIDER:  RADHA Gilliam    HISTORY OF PRESENT ILLNESS:  Nataly Kee, a 71-year-old woman with recent acute pulmonary embolus complicated by cor pulmonale, obesity, and dyslipidemia was seen today at your request for followup.    The patient presented to the [facility] Emergency Room on 02/16/2022 with a 10-day history of persistent exertional dyspnea.  A CT scan showed multiple bilateral pulmonary emboli beginning centrally and extending into the distal right and left pulmonary arteries and all lobar and proximal segmental branches.  The patient's echo showed right ventricular enlargement with a reduction in right ventricular systolic performance.  Left ventricular chamber size and wall motion were normal.    The patient was then transferred to the [facility], where she underwent bilateral pulmonary artery thrombectomy with excellent results.  Deep venous ultrasound showed no evidence of deep venous thrombosis.    We had planned an in-person visit, but the patient's car malfunctioned and she rescheduled for a [employer] telemetry visit.  I had advised formal consultation with Hematology as this was her second episode of pulmonary embolus, and we could identify no definite triggering pathology.  The patient's COVID testing has been negative, but she previously did receive the Moderna vaccine.    The patient reported a history of coronary disease in her mother, but she had no history of hypertension, diabetes mellitus, cigarette smoking or previous MI.    Since I saw her last, Ms. Kee has seen Dr. Martin and a formal evaluation for thrombophilia has been advised.  In the meantime, it appears she will require lifelong anticoagulation with apixaban.    The patient's breathing is back to normal.    As she was about to leave the office today, she casually mentioned she had an episode of chest pressure earlier this morning.  Her ECG did not show  any acute changes, but she has what appears to be a sinus tachycardia with an unusual P-wave axis.  The LA interval is not short, however. Except for her sinus tachycardia, the ECG is unchanged since her last ECG.    PHYSICAL EXAMINATION:    GENERAL:  Exam today demonstrates a very pleasant and cooperative 71-year-old woman.  VITAL SIGNS:  Her blood pressure was 122/70, heart rate is 90.  Her height is 1.63 meters, her weight is 84.5 kilograms, her BMI is 32.  RESPIRATORY:  Her lungs are clear to percussion and auscultation.  CARDIOVASCULAR:  Shows a normal S1 with a normal S2.  There is no S3.  There is no murmur, rub or click.  Her pulses are full and symmetrical.    MEDICATIONS:   1.  Apixaban 5 mg b.i.d.  2.  Fluoxetine 20 mg every day.  3.  Ferrous sulfate.  4.  Omeprazole 20 daily.    LABORATORY STUDIES:  I have personally reviewed her echo, both right and left ventricular chamber size, wall motion and systolic performance are normal.  Her ECG shows what appears to be a sinus tachycardia with an unusual P-wave axis.  She was quite nervous while her ECG was performed.    Her cholesterol level was 232 and HDL 48.    ASSESSMENT:  I am pleased that the patient has recovered from her acute pulmonary thromboembolus.  Right ventricular chamber size and systolic performance are normal.  I agree with Dr. Martin that she will likely require lifelong anticoagulation.    I am uncertain as to the cause of the episode of chest pressure she had earlier today.  Her echo and ECG, performed today,  did not show any acute changes.  In view of her dyslipidemia, I favor formal testing with a stress echocardiogram to make certain there is no evidence of ischemia.  I have instructed her to come to the Emergency Room immediately should she have any prolonged episodes of chest pressure between now and the time of her stress echocardiogram    I performed an American College of Cardiology risk assessment today for the patient.  Her risk  of adverse vascular event over the next 10 years is about 25%, and the American College of Cardiology would clearly favor statin therapy in addition to lifestyle intervention.  The patient is hesitant to take a statin at this time but will discuss with her primary care doctors.    RECOMMENDATIONS:   1.  Mediterranean-style weight loss diet.  2.  Regular exercise program.  3.  Stress echocardiogram to evaluate episode of atypical chest pressure.  4.  Likely will require long-term anticoagulation.  5.  I would favor statin therapy.  Either atorvastatin 40 daily or rosuvastatin 20 daily.  The patient has not yet decided whether she would agree to a statin.    We will contact you with the results of the patient's stress echo.    We appreciate the opportunity to help care for your patient, Jennifer Rios.    Alphonso Martin MD    cc:  Alexx Martin MD  [facility]  [address], [address]  [place] [address]    Latesha Hilario PA-C  [facility],  [place] [address]    Alphonso Martin MD        D: 2022   T: 2022   MT:     Name:     JENNIFER RIOS  MRN:      [id number]        Account:      [id number]   :      1950           Service Date: 2022       Document: [id number]

## 2022-05-04 DIAGNOSIS — K21.9 GASTROESOPHAGEAL REFLUX DISEASE WITHOUT ESOPHAGITIS: ICD-10-CM

## 2022-05-04 DIAGNOSIS — F32.5 MAJOR DEPRESSION IN REMISSION (H): ICD-10-CM

## 2022-05-04 NOTE — TELEPHONE ENCOUNTER
Pt would like to switch to [employer] mail service. She will need the remaining (90 with 2 refills) sent to her new pharmacy.    Nataly Kee is requesting a refill of:    Pending Prescriptions:                       Disp   Refills    FLUoxetine (PROZAC) 20 MG capsule         90 cap*2            Sig: TAKE 1 CAPSULE BY MOUTH EVERY DAY    omeprazole (PRILOSEC) 20 MG DR capsule    90 cap*2            Sig: Take 1 capsule (20 mg) by mouth daily

## 2022-05-09 ENCOUNTER — TELEPHONE (OUTPATIENT)
Dept: CARDIOLOGY | Facility: CLINIC | Age: 72
End: 2022-05-09

## 2022-05-09 DIAGNOSIS — I47.19 ATRIAL TACHYCARDIA (H): Primary | ICD-10-CM

## 2022-05-09 DIAGNOSIS — I26.09 ACUTE PULMONARY EMBOLISM WITH ACUTE COR PULMONALE, UNSPECIFIED PULMONARY EMBOLISM TYPE (H): ICD-10-CM

## 2022-05-09 DIAGNOSIS — R07.89 CHEST PRESSURE: ICD-10-CM

## 2022-05-09 RX ORDER — METOPROLOL SUCCINATE 25 MG/1
25 TABLET, EXTENDED RELEASE ORAL DAILY
Qty: 90 TABLET | Refills: 1 | Status: SHIPPED | OUTPATIENT
Start: 2022-05-09 | End: 2022-05-16

## 2022-05-09 NOTE — TELEPHONE ENCOUNTER
Called patient to discuss finding at her stress echo (canceled). See 's note. This was also discussed with .     Pt agree's to start Metoprolol XL 25 mg daily, get Holter monitor placed, and have EP visit in 1-2 wks.     Placed orders for medication and testing. Pt has a callback number if she has any questions.  will reach out to pt today to help coordinate. KEELY López

## 2022-05-11 ENCOUNTER — HOSPITAL ENCOUNTER (OUTPATIENT)
Dept: CARDIOLOGY | Facility: CLINIC | Age: 72
Discharge: HOME | End: 2022-05-11
Attending: INTERNAL MEDICINE | Admitting: INTERNAL MEDICINE
Payer: MEDICARE

## 2022-05-11 DIAGNOSIS — R07.89 CHEST PRESSURE: ICD-10-CM

## 2022-05-11 DIAGNOSIS — I47.19 ATRIAL TACHYCARDIA (H): ICD-10-CM

## 2022-05-11 DIAGNOSIS — I26.09 ACUTE PULMONARY EMBOLISM WITH ACUTE COR PULMONALE, UNSPECIFIED PULMONARY EMBOLISM TYPE (H): ICD-10-CM

## 2022-05-11 PROCEDURE — 93225 XTRNL ECG REC<48 HRS REC: CPT

## 2022-05-11 PROCEDURE — 93227 XTRNL ECG REC<48 HR R&I: CPT | Performed by: INTERNAL MEDICINE

## 2022-05-16 ENCOUNTER — OFFICE VISIT (OUTPATIENT)
Dept: CARDIOLOGY | Facility: CLINIC | Age: 72
End: 2022-05-16
Attending: INTERNAL MEDICINE
Payer: MEDICARE

## 2022-05-16 VITALS
HEIGHT: 64 IN | BODY MASS INDEX: 32.12 KG/M2 | SYSTOLIC BLOOD PRESSURE: 124 MMHG | HEART RATE: 69 BPM | DIASTOLIC BLOOD PRESSURE: 86 MMHG

## 2022-05-16 DIAGNOSIS — I26.09 ACUTE PULMONARY EMBOLISM WITH ACUTE COR PULMONALE, UNSPECIFIED PULMONARY EMBOLISM TYPE (H): ICD-10-CM

## 2022-05-16 DIAGNOSIS — R07.89 CHEST PRESSURE: ICD-10-CM

## 2022-05-16 DIAGNOSIS — I47.19 ATRIAL TACHYCARDIA (H): ICD-10-CM

## 2022-05-16 PROCEDURE — 99203 OFFICE O/P NEW LOW 30 MIN: CPT | Performed by: INTERNAL MEDICINE

## 2022-05-16 RX ORDER — METOPROLOL SUCCINATE 25 MG/1
25 TABLET, EXTENDED RELEASE ORAL 2 TIMES DAILY
Qty: 180 TABLET | Refills: 3
Start: 2022-05-16 | End: 2022-06-07

## 2022-05-16 NOTE — PROGRESS NOTES
HPI and Plan:   See dictation  [id number]  Today's clinic visit entailed:  The following tests were independently interpreted by me as noted in my documentation: ecg, holter  15 minutes spent on the date of the encounter doing chart review   Provider  Link to MDM Help Grid     The level of medical decision making during this visit was of moderate complexity.      No orders of the defined types were placed in this encounter.      Orders Placed This Encounter   Medications     metoprolol succinate ER (TOPROL XL) 25 MG 24 hr tablet     Sig: Take 1 tablet (25 mg) by mouth 2 times daily     Dispense:  180 tablet     Refill:  3       Medications Discontinued During This Encounter   Medication Reason     metoprolol succinate ER (TOPROL XL) 25 MG 24 hr tablet          Encounter Diagnoses   Name Primary?     Atrial tachycardia (H)      Acute pulmonary embolism with acute cor pulmonale, unspecified pulmonary embolism type (H)      Chest pressure        CURRENT MEDICATIONS:  Current Outpatient Medications   Medication Sig Dispense Refill     apixaban ANTICOAGULANT (ELIQUIS) 5 MG tablet Take 1 tablet (5 mg) by mouth 2 times daily 60 tablet 3     Cholecalciferol (VITAMIN D) 125 MCG (5000 UT) capsule Take 1 capsule by mouth daily 2000 international unit(s)       ferrous sulfate (FE TABS) 325 (65 Fe) MG EC tablet Take 1 tablet (325 mg) by mouth daily 90 tablet 0     FLUoxetine (PROZAC) 20 MG capsule TAKE 1 CAPSULE BY MOUTH EVERY DAY 90 capsule 2     metoprolol succinate ER (TOPROL XL) 25 MG 24 hr tablet Take 1 tablet (25 mg) by mouth 2 times daily 180 tablet 3     omeprazole (PRILOSEC) 20 MG DR capsule Take 1 capsule (20 mg) by mouth daily 90 capsule 2       ALLERGIES     Allergies   Allergen Reactions     No Known Drug Allergies        PAST MEDICAL HISTORY:  Past Medical History:   Diagnosis Date     Depression      Depressive disorder      GERD (gastroesophageal reflux disease)        PAST SURGICAL HISTORY:  Past Surgical  History:   Procedure Laterality Date     COLONOSCOPY  2015    tubular adenoma     HC REMOVAL OF TONSILS,12+ Y/O      age 20     IR PULMONARY ANGIOGRAM BILATERAL  2022     ZZC LIGATE FALLOPIAN TUBE  1983     ZZC VAGINAL HYSTERECTOMY  2001    Hysterectomy, Vaginal - fibroids     ZZ COLONOSCOPY THRU STOMA, DIAGNOSTIC  2010    3 mm polyp in descending colon/ Dr Asencio       FAMILY HISTORY:  Family History   Problem Relation Age of Onset     Coronary Artery Disease Mother      Heart Disease Mother         related to chemotherapy treatment; pacemaker     Cancer Mother         hodgekins disease     Cerebrovascular Disease Mother      Depression Mother      Coronary Artery Disease Father      Respiratory Father         emphysema     Heart Disease Father         abdominal aneurysm     Multiple Sclerosis Sister         relapsing praveen.      Hypertension Sister      Other - See Comments Sister         feet issues     Bipolar Disorder Daughter      Colon Cancer No family hx of        SOCIAL HISTORY:  Social History     Socioeconomic History     Marital status:      Number of children: 3   Tobacco Use     Smoking status: Former Smoker     Quit date: 1975     Years since quittin.4     Smokeless tobacco: Never Used   Substance and Sexual Activity     Alcohol use: Not Currently     Drug use: No     Sexual activity: Yes     Partners: Male     Birth control/protection: Post-menopausal     Social Determinants of Health     Financial Resource Strain: Low Risk      Difficulty of Paying Living Expenses: Not hard at all   Food Insecurity: No Food Insecurity     Worried About Running Out of Food in the Last Year: Never true     Ran Out of Food in the Last Year: Never true   Transportation Needs: No Transportation Needs     Lack of Transportation (Medical): No     Lack of Transportation (Non-Medical): No       Review of Systems:  Skin:          Eyes:         ENT:         Respiratory:         "  Cardiovascular:         Gastroenterology:        Genitourinary:         Musculoskeletal:         Neurologic:         Psychiatric:         Heme/Lymph/Imm:         Endocrine:           Physical Exam:  Vitals: /86   Pulse 69   Ht 1.626 m (5' 4\")   LMP 09/03/2001   BMI 32.12 kg/m      Constitutional:  cooperative, alert and oriented, well developed, well nourished, in no acute distress appears younger than stated age      Skin:  warm and dry to the touch, no apparent skin lesions or masses noted          Head:  normocephalic, no masses or lesions        Eyes:  pupils equal and round, conjunctivae and lids unremarkable, sclera white, no xanthalasma, EOMS intact, no nystagmus        Lymph:No Cervical lymphadenopathy present     ENT:  no pallor or cyanosis        Neck:  carotid pulses are full and equal bilaterally, JVP normal, no carotid bruit        Respiratory:  normal breath sounds, clear to auscultation, normal A-P diameter, normal symmetry, normal respiratory excursion, no use of accessory muscles         Cardiac: regular rhythm, normal S1/S2, no S3 or S4, apical impulse not displaced, no murmurs, gallops or rubs                pulses full and equal, no bruits auscultated                                        GI:  abdomen soft, non-tender, BS normoactive, no mass, no HSM, no bruits        Extremities and Muscular Skeletal:  no deformities, clubbing, cyanosis, erythema observed              Neurological:  no gross motor deficits        Psych:  Alert and Oriented x 3        CC  Alphonso Martin MD  [address]  [place] [address]              "

## 2022-05-16 NOTE — LETTER
5/16/2022    RADHA Brown  [address]  [facility] [address]    RE: Nataly Kee       Dear Colleague,     I had the pleasure of seeing Nataly Kee in the [facility].  HPI and Plan:   See dictation  [id number]  Today's clinic visit entailed:  The following tests were independently interpreted by me as noted in my documentation: ecg, holter  15 minutes spent on the date of the encounter doing chart review   Provider  Link to MDM Help Grid     The level of medical decision making during this visit was of moderate complexity.      No orders of the defined types were placed in this encounter.      Orders Placed This Encounter   Medications     metoprolol succinate ER (TOPROL XL) 25 MG 24 hr tablet     Sig: Take 1 tablet (25 mg) by mouth 2 times daily     Dispense:  180 tablet     Refill:  3       Medications Discontinued During This Encounter   Medication Reason     metoprolol succinate ER (TOPROL XL) 25 MG 24 hr tablet          Encounter Diagnoses   Name Primary?     Atrial tachycardia (H)      Acute pulmonary embolism with acute cor pulmonale, unspecified pulmonary embolism type (H)      Chest pressure        CURRENT MEDICATIONS:  Current Outpatient Medications   Medication Sig Dispense Refill     apixaban ANTICOAGULANT (ELIQUIS) 5 MG tablet Take 1 tablet (5 mg) by mouth 2 times daily 60 tablet 3     Cholecalciferol (VITAMIN D) 125 MCG (5000 UT) capsule Take 1 capsule by mouth daily 2000 international unit(s)       ferrous sulfate (FE TABS) 325 (65 Fe) MG EC tablet Take 1 tablet (325 mg) by mouth daily 90 tablet 0     FLUoxetine (PROZAC) 20 MG capsule TAKE 1 CAPSULE BY MOUTH EVERY DAY 90 capsule 2     metoprolol succinate ER (TOPROL XL) 25 MG 24 hr tablet Take 1 tablet (25 mg) by mouth 2 times daily 180 tablet 3     omeprazole (PRILOSEC) 20 MG DR capsule Take 1 capsule (20 mg) by mouth daily 90 capsule 2       ALLERGIES     Allergies   Allergen Reactions     No Known  Drug Allergies        PAST MEDICAL HISTORY:  Past Medical History:   Diagnosis Date     Depression      Depressive disorder      GERD (gastroesophageal reflux disease)        PAST SURGICAL HISTORY:  Past Surgical History:   Procedure Laterality Date     COLONOSCOPY  2015    tubular adenoma     HC REMOVAL OF TONSILS,12+ Y/O      age 20     IR PULMONARY ANGIOGRAM BILATERAL  2022     ZZC LIGATE FALLOPIAN TUBE  1983     ZZC VAGINAL HYSTERECTOMY  2001    Hysterectomy, Vaginal - fibroids     ZZ COLONOSCOPY THRU STOMA, DIAGNOSTIC  2010    3 mm polyp in descending colon/ Dr Asencio       FAMILY HISTORY:  Family History   Problem Relation Age of Onset     Coronary Artery Disease Mother      Heart Disease Mother         related to chemotherapy treatment; pacemaker     Cancer Mother         hodgekins disease     Cerebrovascular Disease Mother      Depression Mother      Coronary Artery Disease Father      Respiratory Father         emphysema     Heart Disease Father         abdominal aneurysm     Multiple Sclerosis Sister         relapsing praveen.      Hypertension Sister      Other - See Comments Sister         feet issues     Bipolar Disorder Daughter      Colon Cancer No family hx of        SOCIAL HISTORY:  Social History     Socioeconomic History     Marital status:      Number of children: 3   Tobacco Use     Smoking status: Former Smoker     Quit date: 1975     Years since quittin.4     Smokeless tobacco: Never Used   Substance and Sexual Activity     Alcohol use: Not Currently     Drug use: No     Sexual activity: Yes     Partners: Male     Birth control/protection: Post-menopausal     Social Determinants of Health     Financial Resource Strain: Low Risk      Difficulty of Paying Living Expenses: Not hard at all   Food Insecurity: No Food Insecurity     Worried About Running Out of Food in the Last Year: Never true     Ran Out of Food in the Last Year: Never true  "  Transportation Needs: No Transportation Needs     Lack of Transportation (Medical): No     Lack of Transportation (Non-Medical): No       Review of Systems:  Skin:          Eyes:         ENT:         Respiratory:          Cardiovascular:         Gastroenterology:        Genitourinary:         Musculoskeletal:         Neurologic:         Psychiatric:         Heme/Lymph/Imm:         Endocrine:           Physical Exam:  Vitals: /86   Pulse 69   Ht 1.626 m (5' 4\")   LMP 09/03/2001   BMI 32.12 kg/m      Constitutional:  cooperative, alert and oriented, well developed, well nourished, in no acute distress appears younger than stated age      Skin:  warm and dry to the touch, no apparent skin lesions or masses noted          Head:  normocephalic, no masses or lesions        Eyes:  pupils equal and round, conjunctivae and lids unremarkable, sclera white, no xanthalasma, EOMS intact, no nystagmus        Lymph:No Cervical lymphadenopathy present     ENT:  no pallor or cyanosis        Neck:  carotid pulses are full and equal bilaterally, JVP normal, no carotid bruit        Respiratory:  normal breath sounds, clear to auscultation, normal A-P diameter, normal symmetry, normal respiratory excursion, no use of accessory muscles         Cardiac: regular rhythm, normal S1/S2, no S3 or S4, apical impulse not displaced, no murmurs, gallops or rubs                pulses full and equal, no bruits auscultated                                        GI:  abdomen soft, non-tender, BS normoactive, no mass, no HSM, no bruits        Extremities and Muscular Skeletal:  no deformities, clubbing, cyanosis, erythema observed              Neurological:  no gross motor deficits        Psych:  Alert and Oriented x 3          CC  Alphonso Martin MD  [address]  [place] [address]    Thank you for allowing me to participate in the care of your patient.      Sincerely,     Aaron Gamble MD     [facility]" [facility]

## 2022-05-16 NOTE — PROGRESS NOTES
Service Date: 05/16/2022    Thank you for allowing me to participate in the care of your delightful patient.  As you know, Nataly is a 71-year-old female with a remote history of PE when she had a hysterectomy more than 25 years ago and recently was hospitalized for another extensive PE associated with RV dilatation that required thrombectomy.  At that time, she was noted to have some ectopic atrial arrhythmia, for which she not have any palpitations.  She subsequently was recommended to have a stress test because of atypical chest pain by my partner, Dr. Martin.  Before the stress test, she was noted to be in atrial tachycardia at a rate of 140 beats per minute with a negative P waves in inferior leads suggestive of RA inferior portion of the right atrium and likely near the CS os.  She was asymptomatic with those episodes.  The stress test was canceled and she was recommended to wear a 24-hour Holter and she is here to see me for further evaluation.    The patient did not have any symptoms when she wore the Holter.  It showed frequent PACs to 6% of the time with the longest run lasting for almost 200 beats at 125 beats per minute.  She is known to have normal LV systolic function and is scheduled to see Hematology for her PE evaluation.    Nataly otherwise is doing quite well.  She was told not to exercise because of her arrhythmia, which frustrated her because she is very active and would like to get back to exercise.  Baseline EKG demonstrates sinus rhythm.    We discussed at length the cause of her atrial tachycardia, which is unclear whether it was directly a result from the PE causing right sided strain versus preceding the recent PE.  We also discussed potential complications including tachycardia-induced cardiomyopathy, especially if someone had no symptoms like her.    Next, we discussed treatment options including increasing her current Toprol from 25 mg once a day to twice a day and have her wear a heart  rate monitor so that she can tell in real time where her heart rate is at.  To be normally at rest in sinus rhythm, she should be in the 60s.  However, if she is atrial arrhythmias, she should be in the 120s.  If it is consistently on a regular basis, she can contact me for further treatment options including EP study and ablation.  I did tell her about the potential side effects of increasing beta blocker including fatigue and may slow down sinus rhythm.  As long as the patient's atrial tachycardia burden is not that great, she can come back to see EP on a p.r.n. basis.    Aaron Carlson MD        D: 2022   T: 2022   MT: [place]    Name:     JENNIFER RIOS  MRN:      [id number]        Account:      [id number]   :      1950           Service Date: 2022       Document: [id number]

## 2022-05-17 ENCOUNTER — TELEPHONE (OUTPATIENT)
Dept: CARDIOLOGY | Facility: CLINIC | Age: 72
End: 2022-05-17
Payer: MEDICARE

## 2022-05-17 DIAGNOSIS — R07.89 CHEST PRESSURE: ICD-10-CM

## 2022-05-17 DIAGNOSIS — I26.09 ACUTE PULMONARY EMBOLISM WITH ACUTE COR PULMONALE, UNSPECIFIED PULMONARY EMBOLISM TYPE (H): ICD-10-CM

## 2022-05-17 DIAGNOSIS — I47.19 ATRIAL TACHYCARDIA (H): Primary | ICD-10-CM

## 2022-05-17 DIAGNOSIS — E78.5 DYSLIPIDEMIA: ICD-10-CM

## 2022-05-17 NOTE — TELEPHONE ENCOUNTER
"See result note for Holter monitor from  below. Pt states she is stable with no chest pressure. Pt agrees to follow up in about 3 months to check on how she is doing. Pt also states she will get her Lipids checked at her PMD and address with us at next visit. Orders placed.       \"She has been seen by  who increased her metoprolol xl to 50 daily.  There are no plans for EP testing or follow up at this time. She will likely remain on long term anticoagulation but we will await Dr. Mratin's formal recommendation. Let's plan a follow up with SONIDO in 3 months to make certain she is doing well . Unless the chest discomfort is recurrent problem, let's hold off on any further work up at this time. Please touch bases with the patient to confirm this plan sometime this week. I will CC Ms. Hilario so she is in the loop. Dr. Martin\"    "

## 2022-05-18 ENCOUNTER — MYC MEDICAL ADVICE (OUTPATIENT)
Dept: FAMILY MEDICINE | Facility: CLINIC | Age: 72
End: 2022-05-18

## 2022-05-18 ENCOUNTER — IMMUNIZATION (OUTPATIENT)
Dept: NURSING | Facility: CLINIC | Age: 72
End: 2022-05-18
Payer: MEDICARE

## 2022-05-18 DIAGNOSIS — D62 ANEMIA DUE TO BLOOD LOSS, ACUTE: Primary | ICD-10-CM

## 2022-05-18 PROCEDURE — 91306 COVID-19,PF,MODERNA (18+ YRS BOOSTER .25ML): CPT

## 2022-05-18 PROCEDURE — 0064A COVID-19,PF,MODERNA (18+ YRS BOOSTER .25ML): CPT

## 2022-05-19 DIAGNOSIS — D62 ANEMIA DUE TO BLOOD LOSS, ACUTE: ICD-10-CM

## 2022-05-19 LAB
ERYTHROCYTE [DISTWIDTH] IN BLOOD BY AUTOMATED COUNT: 17.9 %
HCT VFR BLD AUTO: 37.8 % (ref 35–47)
HEMOGLOBIN: 11.7 G/DL (ref 11.7–15.7)
MCH RBC QN AUTO: 23.5 PG (ref 26–33)
MCHC RBC AUTO-ENTMCNC: 31 G/DL (ref 31–36)
MCV RBC AUTO: 76 FL (ref 78–100)
PLATELET COUNT - QUEST: 289 10^9/L (ref 150–375)
RBC # BLD AUTO: 4.97 10*12/L (ref 3.8–5.2)
WBC # BLD AUTO: 7.3 10*9/L (ref 4–11)

## 2022-05-19 PROCEDURE — 85027 COMPLETE CBC AUTOMATED: CPT | Performed by: PHYSICIAN ASSISTANT

## 2022-05-19 PROCEDURE — 36415 COLL VENOUS BLD VENIPUNCTURE: CPT | Performed by: PHYSICIAN ASSISTANT

## 2022-05-20 LAB
% SATURATION - QUEST: 12 % (CALC) (ref 16–45)
FERRITIN SERPL-MCNC: 6 NG/ML (ref 16–288)
IRON: 61 MCG/DL (ref 45–160)
TIBC - QUEST: 496 MCG/DL (CALC) (ref 250–450)

## 2022-05-25 DIAGNOSIS — D62 ANEMIA DUE TO BLOOD LOSS, ACUTE: ICD-10-CM

## 2022-05-25 RX ORDER — FERROUS SULFATE 325(65) MG
325 TABLET, DELAYED RELEASE (ENTERIC COATED) ORAL DAILY
Qty: 90 TABLET | Refills: 0 | Status: SHIPPED | OUTPATIENT
Start: 2022-05-25 | End: 2023-01-30

## 2022-06-07 DIAGNOSIS — R07.89 CHEST PRESSURE: ICD-10-CM

## 2022-06-07 DIAGNOSIS — I47.19 ATRIAL TACHYCARDIA (H): ICD-10-CM

## 2022-06-07 DIAGNOSIS — I26.09 ACUTE PULMONARY EMBOLISM WITH ACUTE COR PULMONALE, UNSPECIFIED PULMONARY EMBOLISM TYPE (H): ICD-10-CM

## 2022-06-07 RX ORDER — METOPROLOL SUCCINATE 25 MG/1
25 TABLET, EXTENDED RELEASE ORAL 2 TIMES DAILY
Qty: 180 TABLET | Refills: 3 | Status: SHIPPED | OUTPATIENT
Start: 2022-06-07 | End: 2023-01-31

## 2022-07-12 DIAGNOSIS — Z86.711 HISTORY OF PULMONARY EMBOLISM: ICD-10-CM

## 2022-08-16 ENCOUNTER — OFFICE VISIT (OUTPATIENT)
Dept: CARDIOLOGY | Facility: CLINIC | Age: 72
End: 2022-08-16
Attending: INTERNAL MEDICINE
Payer: MEDICARE

## 2022-08-16 VITALS
WEIGHT: 198 LBS | DIASTOLIC BLOOD PRESSURE: 82 MMHG | HEART RATE: 66 BPM | OXYGEN SATURATION: 97 % | SYSTOLIC BLOOD PRESSURE: 124 MMHG | HEIGHT: 64 IN | BODY MASS INDEX: 33.8 KG/M2

## 2022-08-16 DIAGNOSIS — R07.89 CHEST PRESSURE: ICD-10-CM

## 2022-08-16 DIAGNOSIS — I47.19 ATRIAL TACHYCARDIA (H): ICD-10-CM

## 2022-08-16 DIAGNOSIS — E78.5 DYSLIPIDEMIA: ICD-10-CM

## 2022-08-16 DIAGNOSIS — I26.09 ACUTE PULMONARY EMBOLISM WITH ACUTE COR PULMONALE, UNSPECIFIED PULMONARY EMBOLISM TYPE (H): ICD-10-CM

## 2022-08-16 PROCEDURE — 99214 OFFICE O/P EST MOD 30 MIN: CPT | Performed by: INTERNAL MEDICINE

## 2022-08-16 RX ORDER — ATORVASTATIN CALCIUM 40 MG/1
80 TABLET, FILM COATED ORAL DAILY
Qty: 90 TABLET | Refills: 11 | Status: SHIPPED | OUTPATIENT
Start: 2022-08-16 | End: 2022-08-19 | Stop reason: DRUGHIGH

## 2022-08-16 RX ORDER — ATORVASTATIN CALCIUM 80 MG/1
80 TABLET, FILM COATED ORAL DAILY
Qty: 90 TABLET | Refills: 3 | Status: SHIPPED | OUTPATIENT
Start: 2022-08-16 | End: 2022-08-16

## 2022-08-16 NOTE — LETTER
"8/16/2022    RADHA Brown  [address], [place] [address]  [facility] [address]    RE: Nataly Kee       Dear Colleague,     I had the pleasure of seeing Nataly Kee in the [facility].  HISTORY OF PRESENT ILLNESS:  No further tachycardia. Has gained wt and is in an exercise class 3 times per week. Does not like taking pills.     Orders this Visit:  No orders of the defined types were placed in this encounter.    No orders of the defined types were placed in this encounter.    There are no discontinued medications.    Encounter Diagnoses   Name Primary?     Atrial tachycardia (H)      Chest pressure      Acute pulmonary embolism with acute cor pulmonale, unspecified pulmonary embolism type (H)      Dyslipidemia        CURRENT MEDICATIONS:  Current Outpatient Medications   Medication Sig Dispense Refill     apixaban ANTICOAGULANT (ELIQUIS) 5 MG tablet Take 1 tablet (5 mg) by mouth 2 times daily 180 tablet 3     Cholecalciferol (VITAMIN D) 125 MCG (5000 UT) capsule Take 1 capsule by mouth daily 2000 international unit(s)       ferrous sulfate (FE TABS) 325 (65 Fe) MG EC tablet Take 1 tablet (325 mg) by mouth daily 90 tablet 0     FLUoxetine (PROZAC) 20 MG capsule TAKE 1 CAPSULE BY MOUTH EVERY DAY 90 capsule 2     metoprolol succinate ER (TOPROL XL) 25 MG 24 hr tablet Take 1 tablet (25 mg) by mouth 2 times daily 180 tablet 3     omeprazole (PRILOSEC) 20 MG DR capsule Take 1 capsule (20 mg) by mouth daily 90 capsule 2       ALLERGIES     Allergies   Allergen Reactions     No Known Drug Allergies        PAST MEDICAL, SURGICAL, FAMILY, SOCIAL HISTORY:  History was reviewed and updated as needed, see medical record.    Review of Systems:  A 12-point review of systems was completed, see medical record for detailed review of systems information.    Physical Exam:  Vitals: /82 (BP Location: Right arm, Patient Position: Sitting, Cuff Size: Adult Large)   Pulse 66   Ht 1.626 m (5' 4\")   " Wt 89.8 kg (198 lb)   LMP 09/03/2001   SpO2 97%   BMI 33.99 kg/m      Constitutional:           Skin:           Head:           Eyes:           ENT:           Neck:           Chest:           Cardiac:                    Abdomen:           Vascular:                                        Extremities and Back:           Neurological:           ASSESSMENT:  Now agreeable to statin in addition to life style     RECOMMENDATIONS:  Mediterranean style wt loss diet  Exercise program  Continue BB  Begin atorvastatin 40  Follow-up in one year      Recent Lab Results:  LIPID RESULTS:  Lab Results   Component Value Date    CHOL 232 (A) 02/16/2022    HDL 48 02/16/2022     (A) 02/16/2022     (H) 04/20/2018    TRIG 138 02/16/2022    CHOLHDLRATIO 5 02/16/2022       LIVER ENZYME RESULTS:  Lab Results   Component Value Date    AST 10 02/19/2022    AST 22 08/05/2013    ALT 11 02/19/2022    ALT 22 08/05/2013       CBC RESULTS:  Lab Results   Component Value Date    WBC 7.3 05/19/2022    RBC 4.97 05/19/2022    HGB 11.7 05/19/2022    HCT 37.8 05/19/2022    MCV 76.0 (A) 05/19/2022    MCH 23.5 (A) 05/19/2022    MCHC 31.0 05/19/2022    RDW 17.9 05/19/2022     05/19/2022       BMP RESULTS:  Lab Results   Component Value Date    .9 03/02/2022    POTASSIUM 4.76 03/02/2022    CHLORIDE 107.6 03/02/2022    CO2 29.9 03/02/2022    ANIONGAP 6 02/19/2022    GLC 97 03/02/2022    BUN 22 03/02/2022    BUN 20.2 03/02/2022    CR 1.09 03/02/2022    GFRESTIMATED 68 02/19/2022    GFRESTIMATED 85 11/09/2016    JOSE L 10.0 03/02/2022        A1C RESULTS:  No results found for: A1C    INR RESULTS:  Lab Results   Component Value Date    INR 1.13 02/17/2022    INR 1.7 02/19/2002    INR 2.2 01/22/2002       We greatly appreciate the opportunity to be involved in the care of your patient, Nataly MARIN Vidhya.    Sincerely,  Alphonso Martin MD      CC  Alphonso Martin MD  [address]  [place]  [id number]                                                                       Service Date: 08/16/2022    HISTORY OF PRESENT ILLNESS:  Nataly Kee, a 71-year-old woman with a history of pulmonary embolus (on chronic apixaban), paroxysmal atrial tachycardia (on beta blockers), dyslipidemia and obesity, was seen today at your request for followup.    When I last saw the patient in 04/2022, she describes a history of atypical chest discomfort and palpitation.  She was initially arranged for a stress echocardiogram, but upon arrival in the stress echo lab was noted to have atrial tachycardia.  She then saw Dr. Carlson in consultation.  Ambulatory monitoring showed episodes of occasional atrial tachycardia, but in general, the patient was in a sinus rhythm.  He recommended increasing Toprol-XL from 25 mg daily to 25 mg b.i.d.    The patient has been free of chest discomfort or palpitation since then.  During her last visit here, we also noted that she had elevated LDL cholesterol levels and performed an ACC risk calculation that yielded a risk of about 25% over the next 10 years for adverse vascular events.  We had advised atorvastatin, but the patient was initially reluctant.  She is now agreeable to start statin therapy in addition to lifestyle intervention.    PAST MEDICAL HISTORY:    1.  History of unprovoked pulmonary embolus, on chronic apixaban.  Echocardiogram 04/2021 showing normal biventricular chamber size and systolic performance with normal estimated pulmonary pressures.  2.  Dyslipidemia, now is agreeable to statin therapy.  3.  Obesity.  4.  Paroxysmal atrial tachycardia, on metoprolol XL 25 mg b.i.d. with good control.  5.  Gastroesophageal reflux disease, on omeprazole.    PHYSICAL EXAMINATION:    GENERAL:  Exam today demonstrates a very pleasant, cooperative and intelligent 71-year-old woman who is overweight.  She appears younger than stated age.  VITAL SIGNS:  Her blood pressure is 124/82, heart rate 66  and regular, height is 1.63 meters her weight is 89.8 kg.  Her BMI is 33.99.  RESPIRATORY:  Her lungs are clear to percussion and auscultation.  CARDIOVASCULAR:  Shows a normal S1 with a normal S2.  There is no S3.  There is no murmur, rub or click.  Her pulses are full and symmetrical.    LABORATORY STUDIES:  Her most recent total cholesterol was 232 with an HDL 48, triglycerides of 138.    ASSESSMENT:  The patient's atrial tachycardia appears to be well controlled on beta blocker therapy.  She will require long-term anticoagulation in view of unprovoked pulmonary embolus.  In addition to a Mediterranean-style diet, regular exercise program and weight loss, we have advised atorvastatin 40 mg daily.  I have discussed the risks and benefits of therapy.  The patient would prefer to follow up her dyslipidemia  with her primary care provider, Ms. Hilario.    RECOMMENDATIONS:    1.  Continue metoprolol at current dose.  2.  Mediterranean-style weight loss diet.  3.  Regular exercise program 45 minutes 5-7 times a week.  4.  Begin atorvastatin 40 daily.  4.  Followup lipid levels and ALT with her primary care provider in about 3 months.    5.  Follow up with me in about 1 year with an EKG at that time.      We appreciate the opportunity to care for your patient, Jennifer Rios.    cc:   Latesha Hilario PA-C  [facility]   [address]  [place] [address]     Alexx Martin MD   [facility]  [address]  [place] [address]     Alphonso Martin MD        D: 2022   T: 2022   MT:     Name:     JENNIFER RIOS  MRN:      [id number]        Account:      [id number]   :      1950           Service Date: 2022       Document: [id number]      Thank you for allowing me to participate in the care of your patient.      Sincerely,     Alphonso Martin MD     [facility]  cc:    Alphonso Martin MD  [address]  [place] [id number]

## 2022-08-16 NOTE — PROGRESS NOTES
Service Date: 08/16/2022    HISTORY OF PRESENT ILLNESS:  Nataly Kee, a 71-year-old woman with a history of pulmonary embolus (on chronic apixaban), paroxysmal atrial tachycardia (on beta blockers), dyslipidemia and obesity, was seen today at your request for followup.    When I last saw the patient in 04/2022, she describes a history of atypical chest discomfort and palpitation.  She was initially arranged for a stress echocardiogram, but upon arrival in the stress echo lab was noted to have atrial tachycardia.  She then saw Dr. Carlson in consultation.  Ambulatory monitoring showed episodes of occasional atrial tachycardia, but in general, the patient was in a sinus rhythm.  He recommended increasing Toprol-XL from 25 mg daily to 25 mg b.i.d.    The patient has been free of chest discomfort or palpitation since then.  During her last visit here, we also noted that she had elevated LDL cholesterol levels and performed an ACC risk calculation that yielded a risk of about 25% over the next 10 years for adverse vascular events.  We had advised atorvastatin, but the patient was initially reluctant.  She is now agreeable to start statin therapy in addition to lifestyle intervention.    PAST MEDICAL HISTORY:    1.  History of unprovoked pulmonary embolus, on chronic apixaban.  Echocardiogram 04/2021 showing normal biventricular chamber size and systolic performance with normal estimated pulmonary pressures.  2.  Dyslipidemia, now is agreeable to statin therapy.  3.  Obesity.  4.  Paroxysmal atrial tachycardia, on metoprolol XL 25 mg b.i.d. with good control.  5.  Gastroesophageal reflux disease, on omeprazole.    PHYSICAL EXAMINATION:    GENERAL:  Exam today demonstrates a very pleasant, cooperative and intelligent 71-year-old woman who is overweight.  She appears younger than stated age.  VITAL SIGNS:  Her blood pressure is 124/82, heart rate 66 and regular, height is 1.63 meters her weight is 89.8 kg.  Her BMI is  33.99.  RESPIRATORY:  Her lungs are clear to percussion and auscultation.  CARDIOVASCULAR:  Shows a normal S1 with a normal S2.  There is no S3.  There is no murmur, rub or click.  Her pulses are full and symmetrical.    LABORATORY STUDIES:  Her most recent total cholesterol was 232 with an HDL 48, triglycerides of 138.    ASSESSMENT:  The patient's atrial tachycardia appears to be well controlled on beta blocker therapy.  She will require long-term anticoagulation in view of unprovoked pulmonary embolus.  In addition to a Mediterranean-style diet, regular exercise program and weight loss, we have advised atorvastatin 40 mg daily.  I have discussed the risks and benefits of therapy.  The patient would prefer to follow up her dyslipidemia  with her primary care provider, Ms. Hilario.    RECOMMENDATIONS:    1.  Continue metoprolol at current dose.  2.  Mediterranean-style weight loss diet.  3.  Regular exercise program 45 minutes 5-7 times a week.  4.  Begin atorvastatin 40 daily.  4.  Followup lipid levels and ALT with her primary care provider in about 3 months.    5.  Follow up with me in about 1 year with an EKG at that time.      We appreciate the opportunity to care for your patient, Jennifer Rios.    cc:   Latesha Hilario PA-C  [facility]   [address]  [place] [address]     Alexx Martin MD   [facility]  [address]  [place] [address]     Alphonso Martin MD        D: 2022   T: 2022   MT: DANIEL    Name:     JENNIFER RIOS  MRN:      [id number]        Account:      [id number]   :      1950           Service Date: 2022       Document: [id number]

## 2022-08-16 NOTE — PROGRESS NOTES
"HISTORY OF PRESENT ILLNESS:  No further tachycardia. Has gained wt and is in an exercise class 3 times per week. Does not like taking pills.     Orders this Visit:  No orders of the defined types were placed in this encounter.    No orders of the defined types were placed in this encounter.    There are no discontinued medications.    Encounter Diagnoses   Name Primary?     Atrial tachycardia (H)      Chest pressure      Acute pulmonary embolism with acute cor pulmonale, unspecified pulmonary embolism type (H)      Dyslipidemia        CURRENT MEDICATIONS:  Current Outpatient Medications   Medication Sig Dispense Refill     apixaban ANTICOAGULANT (ELIQUIS) 5 MG tablet Take 1 tablet (5 mg) by mouth 2 times daily 180 tablet 3     Cholecalciferol (VITAMIN D) 125 MCG (5000 UT) capsule Take 1 capsule by mouth daily 2000 international unit(s)       ferrous sulfate (FE TABS) 325 (65 Fe) MG EC tablet Take 1 tablet (325 mg) by mouth daily 90 tablet 0     FLUoxetine (PROZAC) 20 MG capsule TAKE 1 CAPSULE BY MOUTH EVERY DAY 90 capsule 2     metoprolol succinate ER (TOPROL XL) 25 MG 24 hr tablet Take 1 tablet (25 mg) by mouth 2 times daily 180 tablet 3     omeprazole (PRILOSEC) 20 MG DR capsule Take 1 capsule (20 mg) by mouth daily 90 capsule 2       ALLERGIES     Allergies   Allergen Reactions     No Known Drug Allergies        PAST MEDICAL, SURGICAL, FAMILY, SOCIAL HISTORY:  History was reviewed and updated as needed, see medical record.    Review of Systems:  A 12-point review of systems was completed, see medical record for detailed review of systems information.    Physical Exam:  Vitals: /82 (BP Location: Right arm, Patient Position: Sitting, Cuff Size: Adult Large)   Pulse 66   Ht 1.626 m (5' 4\")   Wt 89.8 kg (198 lb)   LMP 09/03/2001   SpO2 97%   BMI 33.99 kg/m      Constitutional:           Skin:           Head:           Eyes:           ENT:           Neck:           Chest:           Cardiac:                "     Abdomen:           Vascular:                                        Extremities and Back:           Neurological:           ASSESSMENT:  Now agreeable to statin in addition to life style     RECOMMENDATIONS:  Mediterranean style wt loss diet  Exercise program  Continue BB  Begin atorvastatin 40  Follow-up in one year      Recent Lab Results:  LIPID RESULTS:  Lab Results   Component Value Date    CHOL 232 (A) 02/16/2022    HDL 48 02/16/2022     (A) 02/16/2022     (H) 04/20/2018    TRIG 138 02/16/2022    CHOLHDLRATIO 5 02/16/2022       LIVER ENZYME RESULTS:  Lab Results   Component Value Date    AST 10 02/19/2022    AST 22 08/05/2013    ALT 11 02/19/2022    ALT 22 08/05/2013       CBC RESULTS:  Lab Results   Component Value Date    WBC 7.3 05/19/2022    RBC 4.97 05/19/2022    HGB 11.7 05/19/2022    HCT 37.8 05/19/2022    MCV 76.0 (A) 05/19/2022    MCH 23.5 (A) 05/19/2022    MCHC 31.0 05/19/2022    RDW 17.9 05/19/2022     05/19/2022       BMP RESULTS:  Lab Results   Component Value Date    .9 03/02/2022    POTASSIUM 4.76 03/02/2022    CHLORIDE 107.6 03/02/2022    CO2 29.9 03/02/2022    ANIONGAP 6 02/19/2022    GLC 97 03/02/2022    BUN 22 03/02/2022    BUN 20.2 03/02/2022    CR 1.09 03/02/2022    GFRESTIMATED 68 02/19/2022    GFRESTIMATED 85 11/09/2016    JOSE L 10.0 03/02/2022        A1C RESULTS:  No results found for: A1C    INR RESULTS:  Lab Results   Component Value Date    INR 1.13 02/17/2022    INR 1.7 02/19/2002    INR 2.2 01/22/2002       We greatly appreciate the opportunity to be involved in the care of your patient, Nataly Kee.    Sincerely,  Alphonso Martin MD      CC  Alphonso Martin MD  [address]  [place] [id number]

## 2022-08-17 DIAGNOSIS — D62 ANEMIA DUE TO BLOOD LOSS, ACUTE: ICD-10-CM

## 2022-08-17 LAB
ERYTHROCYTE [DISTWIDTH] IN BLOOD BY AUTOMATED COUNT: 18.1 %
HCT VFR BLD AUTO: 40.2 % (ref 35–47)
HEMOGLOBIN: 12.6 G/DL (ref 11.7–15.7)
MCH RBC QN AUTO: 25.5 PG (ref 26–33)
MCHC RBC AUTO-ENTMCNC: 31.3 G/DL (ref 31–36)
MCV RBC AUTO: 81.3 FL (ref 78–100)
PLATELET COUNT - QUEST: 269 10^9/L (ref 150–375)
RBC # BLD AUTO: 4.94 10*12/L (ref 3.8–5.2)
WBC # BLD AUTO: 6.3 10*9/L (ref 4–11)

## 2022-08-17 PROCEDURE — 85027 COMPLETE CBC AUTOMATED: CPT | Performed by: PHYSICIAN ASSISTANT

## 2022-08-17 PROCEDURE — 36415 COLL VENOUS BLD VENIPUNCTURE: CPT | Performed by: PHYSICIAN ASSISTANT

## 2022-08-18 LAB
% SATURATION - QUEST: 13 % (CALC) (ref 16–45)
FERRITIN SERPL-MCNC: 9 NG/ML (ref 16–288)
IRON: 57 MCG/DL (ref 45–160)
TIBC - QUEST: 437 MCG/DL (CALC) (ref 250–450)

## 2022-08-19 ENCOUNTER — TELEPHONE (OUTPATIENT)
Dept: CARDIOLOGY | Facility: CLINIC | Age: 72
End: 2022-08-19

## 2022-08-19 DIAGNOSIS — E78.5 DYSLIPIDEMIA: Primary | ICD-10-CM

## 2022-08-19 RX ORDER — ATORVASTATIN CALCIUM 40 MG/1
40 TABLET, FILM COATED ORAL DAILY
Qty: 90 TABLET | Refills: 3 | Status: SHIPPED | OUTPATIENT
Start: 2022-08-19 | End: 2023-01-30

## 2022-08-19 NOTE — TELEPHONE ENCOUNTER
Patient received an email from [employer] and they are requesting clarification on atorvastatin 40mg 1 tablet daily instead of 80mg take 2 forty tablets.  Per OV note atorvastatin 40mg daily resent RX to [employer].    8-16-22 OV   In addition to a Mediterranean-style diet, regular exercise program and weight loss, we have advised atorvastatin 40 mg daily      Patient called and notified of correction.  Patient verbalized understanding.

## 2022-08-25 ENCOUNTER — TELEPHONE (OUTPATIENT)
Dept: CARDIOLOGY | Facility: CLINIC | Age: 72
End: 2022-08-25

## 2022-08-25 NOTE — TELEPHONE ENCOUNTER
Patient reports she received both 80mg tablets and 40mg tablets of atorvastatin from [facility].  Per Dr. Martin note she should take atorvastatin 40mg and she will notify [facility] of the correct dose.  Vanessa Moraes RN on 8/25/2022 at 8:56 AM  8-16-22 Dr. Martin OV and 8-19-22 phone note.

## 2022-09-12 ENCOUNTER — HOSPITAL ENCOUNTER (OUTPATIENT)
Dept: CT IMAGING | Facility: CLINIC | Age: 72
Discharge: HOME | End: 2022-09-12
Attending: INTERNAL MEDICINE | Admitting: INTERNAL MEDICINE
Payer: MEDICARE

## 2022-09-12 DIAGNOSIS — I26.09 OTHER PULMONARY EMBOLISM WITH ACUTE COR PULMONALE, UNSPECIFIED CHRONICITY (H): ICD-10-CM

## 2022-09-12 LAB
CREAT BLD-MCNC: 0.8 MG/DL (ref 0.5–1)
GFR SERPL CREATININE-BSD FRML MDRD: >60 ML/MIN/1.73M2

## 2022-09-12 PROCEDURE — 250N000011 HC RX IP 250 OP 636: Performed by: RADIOLOGY

## 2022-09-12 PROCEDURE — 250N000009 HC RX 250: Performed by: RADIOLOGY

## 2022-09-12 PROCEDURE — 82565 ASSAY OF CREATININE: CPT

## 2022-09-12 PROCEDURE — 74177 CT ABD & PELVIS W/CONTRAST: CPT

## 2022-09-12 RX ORDER — IOPAMIDOL 755 MG/ML
500 INJECTION, SOLUTION INTRAVASCULAR ONCE
Status: COMPLETED | OUTPATIENT
Start: 2022-09-12 | End: 2022-09-12

## 2022-09-12 RX ADMIN — IOPAMIDOL 90 ML: 755 INJECTION, SOLUTION INTRAVENOUS at 13:05

## 2022-09-12 RX ADMIN — SODIUM CHLORIDE 65 ML: 9 INJECTION, SOLUTION INTRAVENOUS at 13:05

## 2022-09-20 ENCOUNTER — TRANSFERRED RECORDS (OUTPATIENT)
Dept: FAMILY MEDICINE | Facility: CLINIC | Age: 72
End: 2022-09-20

## 2022-10-09 ENCOUNTER — HEALTH MAINTENANCE LETTER (OUTPATIENT)
Age: 72
End: 2022-10-09

## 2023-01-30 ENCOUNTER — OFFICE VISIT (OUTPATIENT)
Dept: FAMILY MEDICINE | Facility: CLINIC | Age: 73
End: 2023-01-30

## 2023-01-30 VITALS
WEIGHT: 204 LBS | HEIGHT: 64 IN | OXYGEN SATURATION: 97 % | HEART RATE: 80 BPM | DIASTOLIC BLOOD PRESSURE: 58 MMHG | BODY MASS INDEX: 34.83 KG/M2 | SYSTOLIC BLOOD PRESSURE: 116 MMHG | TEMPERATURE: 97.2 F

## 2023-01-30 DIAGNOSIS — F32.5 MAJOR DEPRESSION IN REMISSION (H): ICD-10-CM

## 2023-01-30 DIAGNOSIS — K21.9 GASTROESOPHAGEAL REFLUX DISEASE WITHOUT ESOPHAGITIS: ICD-10-CM

## 2023-01-30 DIAGNOSIS — E78.2 MIXED HYPERLIPIDEMIA: Primary | ICD-10-CM

## 2023-01-30 PROCEDURE — 99214 OFFICE O/P EST MOD 30 MIN: CPT | Performed by: PHYSICIAN ASSISTANT

## 2023-01-30 RX ORDER — ATORVASTATIN CALCIUM 40 MG/1
40 TABLET, FILM COATED ORAL DAILY
Qty: 90 TABLET | Refills: 3 | Status: SHIPPED | OUTPATIENT
Start: 2023-01-30 | End: 2024-01-24

## 2023-01-30 RX ORDER — VALACYCLOVIR HYDROCHLORIDE 1 G/1
2000 TABLET, FILM COATED ORAL PRN
COMMUNITY
Start: 2023-01-24

## 2023-01-30 ASSESSMENT — ANXIETY QUESTIONNAIRES
1. FEELING NERVOUS, ANXIOUS, OR ON EDGE: NOT AT ALL
GAD7 TOTAL SCORE: 0
IF YOU CHECKED OFF ANY PROBLEMS ON THIS QUESTIONNAIRE, HOW DIFFICULT HAVE THESE PROBLEMS MADE IT FOR YOU TO DO YOUR WORK, TAKE CARE OF THINGS AT HOME, OR GET ALONG WITH OTHER PEOPLE: NOT DIFFICULT AT ALL
6. BECOMING EASILY ANNOYED OR IRRITABLE: NOT AT ALL
3. WORRYING TOO MUCH ABOUT DIFFERENT THINGS: NOT AT ALL
GAD7 TOTAL SCORE: 0
7. FEELING AFRAID AS IF SOMETHING AWFUL MIGHT HAPPEN: NOT AT ALL
2. NOT BEING ABLE TO STOP OR CONTROL WORRYING: NOT AT ALL
5. BEING SO RESTLESS THAT IT IS HARD TO SIT STILL: NOT AT ALL

## 2023-01-30 ASSESSMENT — PATIENT HEALTH QUESTIONNAIRE - PHQ9
5. POOR APPETITE OR OVEREATING: NOT AT ALL
SUM OF ALL RESPONSES TO PHQ QUESTIONS 1-9: 2

## 2023-01-30 NOTE — PROGRESS NOTES
Assessment & Plan     Gastroesophageal reflux disease without esophagitis-well controlled, no concerns. She will trial every other day dosing and report back  Try every other day dosing to see if symptoms are controlled  GERD-friendly diet  - omeprazole (PRILOSEC) 20 MG DR capsule  Dispense: 90 capsule; Refill: 3    Major depression in remission (H)-very well controlled, no concerns per patient  Call if worsening symptoms  - FLUoxetine (PROZAC) 20 MG capsule  Dispense: 90 capsule; Refill: 3    Mixed hyperlipidemia-will take over statin med from cardiology. Will check lipids and LFTs based on cardiology note  Continue heart healthy diet  - VENOUS COLLECTION  - Lipid Panel ([facility])  - Comprehensive Metobolic Panel ([facility])  - atorvastatin (LIPITOR) 40 MG tablet  Dispense: 90 tablet; Refill: 3      Follow up 1 year OV    No follow-ups on file.    Toby Melendez PA-C  [facility]    Subjective   Nataly is a 72 year old, presenting for the following health issues:  Recheck Medication (Non fasting)      HPI     Pt will contact cardiologist for refills of Eliquis and metoprolol-managed by cardiologist.       GERD: taking omeprazole 20mg daily. Has needed this since gaining weight. GERD is well controlled on this dose. Wants to decrease dose.     Depression and Anxiety Follow-Up    How are you doing with your depression since your last visit? No change-doing well, no concerns.     How are you doing with your anxiety since your last visit?  No change-doing well at this time    Are you having other symptoms that might be associated with depression or anxiety? No    Have you had a significant life event? No     Do you have any concerns with your use of alcohol or other drugs? No   Taking Prozac 20mg daily. Feels this is the right dose for her. No concerns.     Social History     Tobacco Use     Smoking status: Former     Types: Cigarettes     Quit date: 1975     Years since quittin.1     Smokeless tobacco:  Never   Substance Use Topics     Alcohol use: Not Currently     Drug use: No     PHQ 2/19/2021 2/16/2022 1/30/2023   PHQ-9 Total Score 4 3 2   Q9: Thoughts of better off dead/self-harm past 2 weeks Not at all Not at all Not at all     DENTON-7 SCORE 2/19/2021 2/16/2022 1/30/2023   Total Score 2 0 0     Last PHQ-9 1/30/2023   1.  Little interest or pleasure in doing things 0   2.  Feeling down, depressed, or hopeless 0   3.  Trouble falling or staying asleep, or sleeping too much 0   4.  Feeling tired or having little energy 1   5.  Poor appetite or overeating 1   6.  Feeling bad about yourself 0   7.  Trouble concentrating 0   8.  Moving slowly or restless 0   Q9: Thoughts of better off dead/self-harm past 2 weeks 0   PHQ-9 Total Score 2   Difficulty at work, home, or with people Somewhat difficult     DENTON-7  1/30/2023   1. Feeling nervous, anxious, or on edge 0   2. Not being able to stop or control worrying 0   3. Worrying too much about different things 0   4. Trouble relaxing 0   5. Being so restless that it is hard to sit still 0   6. Becoming easily annoyed or irritable 0   7. Feeling afraid, as if something awful might happen 0   DENTON-7 Total Score 0   If you checked any problems, how difficult have they made it for you to do your work, take care of things at home, or get along with other people? Not difficult at all       Hyperlipidemia Follow-Up      Are you regularly taking any medication or supplement to lower your cholesterol?   Yes- atorvastatin    Are you having muscle aches or other side effects that you think could be caused by your cholesterol lowering medication?  No  Taking over this med from Dr. Martin-need to recheck lipids and LFTs-future order placed. Had transient muscle pain with starting this but has resolved.     Review of Systems   Constitutional, HEENT, cardiovascular, pulmonary, gi and gu systems are negative, except as otherwise noted.      Objective    /58 (BP Location: Right arm,  "Patient Position: Sitting, Cuff Size: Adult Large)   Pulse 80   Temp 97.2  F (36.2  C) (Temporal)   Ht 1.626 m (5' 4\")   Wt 92.5 kg (204 lb)   LMP 09/03/2001   SpO2 97%   BMI 35.02 kg/m    Body mass index is 35.02 kg/m .  Physical Exam   GENERAL: healthy, alert and no distress  HENT: ear canals and TM's normal, nose and mouth without ulcers or lesions  NECK: no adenopathy, no asymmetry, masses, or scars and thyroid normal to palpation  RESP: lungs clear to auscultation - no rales, rhonchi or wheezes  CV: regular rate and rhythm, normal S1 S2, no S3 or S4, no murmur, click or rub, no peripheral edema and peripheral pulses strong  ABDOMEN: soft, nontender, no hepatosplenomegaly, no masses and bowel sounds normal  MS: no gross musculoskeletal defects noted, no edema  NEURO: Normal strength and tone, mentation intact and speech normal                      "

## 2023-01-30 NOTE — NURSING NOTE
Chief Complaint   Patient presents with     Recheck Medication     Non fasting         Pre-visit Screening:  Immunizations:  up to date  Colonoscopy:  Up to date  Mammogram: is up to date  Asthma Action Test/Plan:  NA  PHQ9:  Done today  GAD7:  Done today  Questioned patient about current smoking habits Pt. Quit some time ago  Ok to leave detailed message on voice mail for today's visit only Yes, phone # [phone]

## 2023-01-31 ENCOUNTER — TELEPHONE (OUTPATIENT)
Dept: CARDIOLOGY | Facility: CLINIC | Age: 73
End: 2023-01-31

## 2023-01-31 DIAGNOSIS — E78.2 MIXED HYPERLIPIDEMIA: ICD-10-CM

## 2023-01-31 DIAGNOSIS — R07.89 CHEST PRESSURE: ICD-10-CM

## 2023-01-31 DIAGNOSIS — Z86.711 HISTORY OF PULMONARY EMBOLISM: ICD-10-CM

## 2023-01-31 DIAGNOSIS — I26.09 ACUTE PULMONARY EMBOLISM WITH ACUTE COR PULMONALE, UNSPECIFIED PULMONARY EMBOLISM TYPE (H): ICD-10-CM

## 2023-01-31 DIAGNOSIS — I47.19 ATRIAL TACHYCARDIA (H): ICD-10-CM

## 2023-01-31 LAB
ALBUMIN SERPL-MCNC: 3.8 G/DL (ref 3.6–5.1)
ALBUMIN/GLOB SERPL: 1.5 {RATIO} (ref 1–2.5)
ALP SERPL-CCNC: 83 U/L (ref 33–130)
ALT 1742-6: 9 U/L (ref 0–32)
AST 1920-8: 11 U/L (ref 0–35)
BILIRUB SERPL-MCNC: 0.9 MG/DL (ref 0.2–1.2)
BUN SERPL-MCNC: 13 MG/DL (ref 7–25)
BUN/CREATININE RATIO: 15.5 (ref 6–22)
CALCIUM SERPL-MCNC: 9.5 MG/DL (ref 8.6–10.3)
CHLORIDE SERPLBLD-SCNC: 105.2 MMOL/L (ref 98–110)
CHOLEST SERPL-MCNC: 158 MG/DL (ref 0–199)
CHOLEST/HDLC SERPL: 4 {RATIO} (ref 0–5)
CO2 SERPL-SCNC: 25.9 MMOL/L (ref 20–32)
CREAT SERPL-MCNC: 0.84 MG/DL (ref 0.6–1.3)
GLOBULIN, CALCULATED - QUEST: 2.5 (ref 1.9–3.7)
GLUCOSE SERPL-MCNC: 107 MG/DL (ref 60–99)
HDLC SERPL-MCNC: 38 MG/DL (ref 40–150)
LDLC SERPL CALC-MCNC: 78 MG/DL (ref 0–130)
POTASSIUM SERPL-SCNC: 4.96 MMOL/L (ref 3.5–5.3)
PROT SERPL-MCNC: 6.3 G/DL (ref 6.1–8.1)
SODIUM SERPL-SCNC: 136.1 MMOL/L (ref 135–146)
TRIGL SERPL-MCNC: 210 MG/DL (ref 0–149)

## 2023-01-31 PROCEDURE — 36415 COLL VENOUS BLD VENIPUNCTURE: CPT | Performed by: PHYSICIAN ASSISTANT

## 2023-01-31 PROCEDURE — 80053 COMPREHEN METABOLIC PANEL: CPT | Performed by: PHYSICIAN ASSISTANT

## 2023-01-31 PROCEDURE — 80061 LIPID PANEL: CPT | Performed by: PHYSICIAN ASSISTANT

## 2023-01-31 RX ORDER — METOPROLOL SUCCINATE 25 MG/1
25 TABLET, EXTENDED RELEASE ORAL 2 TIMES DAILY
Qty: 180 TABLET | Refills: 1 | Status: SHIPPED | OUTPATIENT
Start: 2023-01-31 | End: 2023-05-19

## 2023-01-31 NOTE — TELEPHONE ENCOUNTER
[facility] Call Center    Phone Message    May a detailed message be left on voicemail: yes     Reason for Call: Medication Refill Request    Has the patient contacted the pharmacy for the refill? Yes   Name of medication being requested: apixaban ANTICOAGULANT (ELIQUIS) 5 MG tablet  metoprolol succinate ER (TOPROL XL) 25 MG 24 hr tablet  Provider who prescribed the medication: Dr Carlson  Pharmacy:    [facility] - [address]    Date medication is needed: 2/6/23   The patient has changed pharmacies and she said she has about 2 weeks left of the Eliquis.      Action Taken: Other: Cardiology    Travel Screening: Not Applicable     Thank you!  [facility]

## 2023-01-31 NOTE — TELEPHONE ENCOUNTER
[facility] Refill Guideline reviewed.  Medications meet criteria for refill.

## 2023-02-14 ENCOUNTER — TELEPHONE (OUTPATIENT)
Dept: SLEEP MEDICINE | Facility: CLINIC | Age: 73
End: 2023-02-14
Payer: COMMERCIAL

## 2023-02-14 DIAGNOSIS — G47.33 OSA (OBSTRUCTIVE SLEEP APNEA): Primary | ICD-10-CM

## 2023-02-14 NOTE — TELEPHONE ENCOUNTER
Reason for call:  Other   Patient called regarding (reason for call):   CPAP Refill request    Additional comments:   PT requesting a CPAP Refill request.   Please respond via [employer]. She has an upcoming appt in May.    Phone number to reach patient:  Cell number on file:    Telephone Information:   Mobile [phone]       Best Time:  ANY    Can we leave a detailed message on this number?  YES    Travel screening: Not Applicable

## 2023-02-15 NOTE — TELEPHONE ENCOUNTER
Last ov:7/8/21  Next ov 5/12/23    Patient requesting updated supply orders prior to appointment. Order pended for provider review     Lynn RAMIREZ RN  [facility]

## 2023-03-27 ENCOUNTER — NURSE TRIAGE (OUTPATIENT)
Dept: NURSING | Facility: CLINIC | Age: 73
End: 2023-03-27
Payer: COMMERCIAL

## 2023-03-27 ENCOUNTER — OFFICE VISIT (OUTPATIENT)
Dept: FAMILY MEDICINE | Facility: CLINIC | Age: 73
End: 2023-03-27

## 2023-03-27 VITALS
HEIGHT: 64 IN | HEART RATE: 74 BPM | TEMPERATURE: 97.1 F | BODY MASS INDEX: 35.34 KG/M2 | WEIGHT: 207 LBS | OXYGEN SATURATION: 97 % | DIASTOLIC BLOOD PRESSURE: 62 MMHG | SYSTOLIC BLOOD PRESSURE: 110 MMHG

## 2023-03-27 DIAGNOSIS — J06.9 VIRAL URI: ICD-10-CM

## 2023-03-27 DIAGNOSIS — R07.0 THROAT PAIN: Primary | ICD-10-CM

## 2023-03-27 LAB — STREP A: NEGATIVE

## 2023-03-27 PROCEDURE — 99213 OFFICE O/P EST LOW 20 MIN: CPT | Performed by: PHYSICIAN ASSISTANT

## 2023-03-27 PROCEDURE — 87651 STREP A DNA AMP PROBE: CPT | Performed by: PHYSICIAN ASSISTANT

## 2023-03-27 NOTE — NURSING NOTE
Chief Complaint   Patient presents with     Throat Problem     Throat pain on the left side that radiates up to her left ear- began a week ago  Feels like cotton in her mouth and tongue swells  Pt's grandson has strep         Pre-visit Screening:  Immunizations:  up to date  Colonoscopy:  is up to date  Mammogram: is up to date  Asthma Action Test/Plan:  NA  PHQ9:  NA  GAD7:  NA  Questioned patient about current smoking habits Pt. quit smoking some time ago.  Ok to leave detailed message on voice mail for today's visit only Yes, phone # [phone]

## 2023-03-27 NOTE — PROGRESS NOTES
"  Assessment & Plan     Throat pain    - Strep A (BFP)    Viral URI-negative Strep, no concerning signs in history of PE. Will treat conservatively and await improvement  -Rest, increase fluids, honey for cough suppression/sore throat  -Add Mucinex and Sudafed D for symptomatic relief  -Ibuprofen/Tylenol as needed for symptomatic relief  Seek emergency care for significant shortness of breath, chest pain, and/or fever >103F that cannot be controlled with antipyretics       Follow up as needed    No follow-ups on file.    Toby Melendez PA-C  [facility]    Subjective   Nataly is a 72 year old, presenting for the following health issues:  Throat Problem (Throat pain on the left side that radiates up to her left ear- began a week ago/Feels like cotton in her mouth and tongue swells/Pt's grandson has strep)  No flowsheet data found.  HPI     Grandson has strep throat-she was exposed  No pain in cheek    Acute Illness  Acute illness concerns: Sore throat (L side), tongue swelling  Onset/Duration: 1 week  Symptoms:  Fever: No  Chills/Sweats: No  Headache (location?): YES  Sinus Pressure: No  Conjunctivitis:  No  Ear Pain: no  Rhinorrhea: No  Congestion: No  Sore Throat: YES  Cough: YES-non-productive  Wheeze: No  Decreased Appetite: No  Nausea: No  Vomiting: No  Diarrhea: No  Dysuria/Freq.: No  Dysuria or Hematuria: No  Fatigue/Achiness: No  Sick/Strep Exposure: YES  Therapies tried and outcome: Tylenol      Review of Systems   Constitutional, HEENT, cardiovascular, pulmonary, gi and gu systems are negative, except as otherwise noted.      Objective    /62 (BP Location: Right arm, Patient Position: Sitting, Cuff Size: Adult Large)   Pulse 74   Temp 97.1  F (36.2  C) (Temporal)   Ht 1.626 m (5' 4\")   Wt 93.9 kg (207 lb)   LMP 09/03/2001   SpO2 97%   BMI 35.53 kg/m    Body mass index is 35.53 kg/m .  Physical Exam   GENERAL: healthy, alert and no distress  HENT: ear canals and TM's normal, nose " and mouth without ulcers or lesions, L exudate noted, L cheek slightly swollen  NECK: no adenopathy, no asymmetry, masses, or scars and thyroid normal to palpation  RESP: lungs clear to auscultation - no rales, rhonchi or wheezes  CV: regular rate and rhythm, normal S1 S2, no S3 or S4, no murmur, click or rub, no peripheral edema and peripheral pulses strong  ABDOMEN: soft, nontender, no hepatosplenomegaly, no masses and bowel sounds normal  MS: no gross musculoskeletal defects noted, no edema  NEURO: Normal strength and tone, mentation intact and speech normal  PSYCH: mentation appears normal, affect normal/bright      Results for orders placed or performed in visit on 03/27/23 (from the past 24 hour(s))   Strep A (BFP)   Result Value Ref Range    STREP A Negative Negative

## 2023-03-27 NOTE — TELEPHONE ENCOUNTER
Nurse Triage SBAR     Situation: Patient calling with a sore throat and intermittent tongue swelling     Background: Symptoms started after coming into contact with her 8 year old grandson who has strep     Assessment: Wondering if she needs to get tested for strep. Also mentions intermittent tongue swelling that usually occurs in the evening for a short while but gets better after she takes tylenol     Recommendation: C advised. Reviewed care advice per protocol. Patient states she will call her normal clinic ([facility]) to see if they can see her today since the [facility] is too long of a wait when she looked it up online         Reason for Disposition    Patient requesting a strep throat test    [1] Swelling of tongue is a recurrent problem AND [2] no swelling at present    Additional Information    Negative: SEVERE difficulty breathing (e.g., struggling for each breath, speaks in single words)    Negative: Sounds like a life-threatening emergency to the triager    Negative: Throat culture results, call about    Negative: Productive cough is main symptom    Negative: Runny nose is main symptom    Negative: Fever present > 3 days (72 hours)    Negative: SEVERE sore throat pain    Negative: Pus on tonsils (back of throat) and swollen neck lymph nodes ('glands')    Negative: Earache also present    Negative: Widespread rash (especially chest and abdomen)    Negative: Diabetes mellitus or weak immune system (e.g., HIV positive, cancer chemo, splenectomy, organ transplant, chronic steroids)    Negative: History of rheumatic fever    Negative: Patient wants to be seen    Negative: Difficulty breathing (per caller) but not severe    Negative: Fever > 103 F (39.4 C)    Negative: Refuses to drink anything for > 12 hours    Negative: Drooling or spitting out saliva (because can't swallow)    Negative: Unable to open mouth completely    Negative: Drinking very little and has signs of dehydration  (e.g., no urine > 12 hours, very dry mouth, very lightheaded)    Negative: Patient sounds very sick or weak to the triager    Negative: Pain in tongue, mouth, or tooth    Negative: All other adults with swollen tongue   (Exception: tongue swelling is a recurrent problem AND NO swelling at present)    Negative: Followed a tongue injury    Negative: Started suddenly after sting from bee, wasp, or yellow jacket    Negative: Started suddenly after taking a medicine or allergic food (e.g., nuts)    Negative: Wheezing, stridor, hoarseness, or difficulty breathing    Negative: Facial swelling    Negative: Neck swelling    Negative: Can't swallow normal secretions (e.g., drooling or spitting)    Negative: Taking an ACE Inhibitor medication  (e.g., benazepril/LOTENSIN, captopril/CAPOTEN, enalapril/VASOTEC, lisinopril/ZESTRIL)    Negative: Sounds like a life-threatening emergency to the triager    Protocols used: SORE THROAT-A-OH, TONGUE SWELLING-A-      Ambika Jefferson RN [facility] March 27, 2023 12:37 PM

## 2023-05-03 ENCOUNTER — TELEPHONE (OUTPATIENT)
Dept: CARDIOLOGY | Facility: CLINIC | Age: 73
End: 2023-05-03
Payer: COMMERCIAL

## 2023-05-03 DIAGNOSIS — R07.89 CHEST PRESSURE: Primary | ICD-10-CM

## 2023-05-04 ENCOUNTER — TELEPHONE (OUTPATIENT)
Dept: CARDIOLOGY | Facility: CLINIC | Age: 73
End: 2023-05-04
Payer: COMMERCIAL

## 2023-05-04 NOTE — TELEPHONE ENCOUNTER
Received VM from patient regarding sxs. Call placed to patient. No answer. Left VM.  Amina HYMAN RN  05/04/23 at 8:43 AM

## 2023-05-04 NOTE — TELEPHONE ENCOUNTER
"Patient returned call to report an isolated incident last night of chest pain that woke her up in her sleep. Patient stated it did go away after an hour on its own. Stated since then, she has had some chest pressure with exertion that is relieved when she rests. Denies any shortness of breath, lightheadedness or dizziness. Patient stated she has not had any sxs except this incident since her last OV in August 2022.    Her last OV with Dr. Martin 8/16/22 note states \"When I last saw the patient in 04/2022, she describes a history of atypical chest discomfort and palpitation.  She was initially arranged for a stress echocardiogram, but upon arrival in the stress echo lab was noted to have atrial tachycardia.  She then saw Dr. Carlson in consultation.  Ambulatory monitoring showed episodes of occasional atrial tachycardia, but in general, the patient was in a sinus rhythm.  He recommended increasing Toprol-XL from 25 mg daily to 25 mg b.i.d.\"    Patient wanted Dr. Martin to be aware of her sxs and wondering if he would like any extra testing prior to her appointment in September of this year. Will route to provider for review.    Future Appointments   Date Time Provider Department Center   5/12/2023  9:00 AM Jasmin Castañeda PA-C [facility] SLEEP   9/19/2023  9:00 AM Alphonso Martin MD [facility] SEFERINO HYMAN RN  05/04/23 at 2:08 PM           "

## 2023-05-04 NOTE — TELEPHONE ENCOUNTER
Patient returned call. Reviewed recommendations. Orders placed for stress echocardiogram and follow-up SONIDO. Patient will call and schedule testing, once that is completed, I will look for appt with SONIDO.  Amina HYMAN RN  05/04/23 at 3:28 PM

## 2023-05-04 NOTE — TELEPHONE ENCOUNTER
Call placed to patient to review recommendations,. No answer, left CECY HYMAN RN  05/04/23 at 2:43 PM

## 2023-05-05 ENCOUNTER — TRANSFERRED RECORDS (OUTPATIENT)
Dept: FAMILY MEDICINE | Facility: CLINIC | Age: 73
End: 2023-05-05

## 2023-05-05 ASSESSMENT — SLEEP AND FATIGUE QUESTIONNAIRES
HOW LIKELY ARE YOU TO NOD OFF OR FALL ASLEEP WHILE WATCHING TV: SLIGHT CHANCE OF DOZING
HOW LIKELY ARE YOU TO NOD OFF OR FALL ASLEEP IN A CAR, WHILE STOPPED FOR A FEW MINUTES IN TRAFFIC: WOULD NEVER DOZE
HOW LIKELY ARE YOU TO NOD OFF OR FALL ASLEEP WHEN YOU ARE A PASSENGER IN A CAR FOR AN HOUR WITHOUT A BREAK: SLIGHT CHANCE OF DOZING
HOW LIKELY ARE YOU TO NOD OFF OR FALL ASLEEP WHILE SITTING AND TALKING TO SOMEONE: WOULD NEVER DOZE
HOW LIKELY ARE YOU TO NOD OFF OR FALL ASLEEP WHILE SITTING AND READING: WOULD NEVER DOZE
HOW LIKELY ARE YOU TO NOD OFF OR FALL ASLEEP WHILE SITTING INACTIVE IN A PUBLIC PLACE: WOULD NEVER DOZE
HOW LIKELY ARE YOU TO NOD OFF OR FALL ASLEEP WHILE LYING DOWN TO REST IN THE AFTERNOON WHEN CIRCUMSTANCES PERMIT: MODERATE CHANCE OF DOZING
HOW LIKELY ARE YOU TO NOD OFF OR FALL ASLEEP WHILE SITTING QUIETLY AFTER LUNCH WITHOUT ALCOHOL: SLIGHT CHANCE OF DOZING

## 2023-05-05 NOTE — TELEPHONE ENCOUNTER
Patient scheduled for stress echocardiogram on 5/18 and follow-up 5/19 with Belinda Yoder CNP.    Future Appointments   Date Time Provider Department Center   5/12/2023  9:00 AM Jasmin Castañeda PA-C [facility] SLEEP   5/18/2023 10:00 [facility]   5/19/2023  3:00 PM Belinda Yoder APRN CNP [facility] CLIN   9/19/2023  9:00 AM Alphonso Martin MD [facility] CLIN     Amina HYMAN RN  05/05/23 at 8:16 AM

## 2023-05-12 ENCOUNTER — OFFICE VISIT (OUTPATIENT)
Dept: SLEEP MEDICINE | Facility: CLINIC | Age: 73
End: 2023-05-12
Payer: COMMERCIAL

## 2023-05-12 VITALS
BODY MASS INDEX: 33.82 KG/M2 | OXYGEN SATURATION: 96 % | DIASTOLIC BLOOD PRESSURE: 78 MMHG | HEIGHT: 65 IN | WEIGHT: 203 LBS | HEART RATE: 62 BPM | SYSTOLIC BLOOD PRESSURE: 110 MMHG

## 2023-05-12 DIAGNOSIS — G47.33 OBSTRUCTIVE SLEEP APNEA (ADULT) (PEDIATRIC): Primary | ICD-10-CM

## 2023-05-12 DIAGNOSIS — G47.33 OSA (OBSTRUCTIVE SLEEP APNEA): Primary | ICD-10-CM

## 2023-05-12 PROCEDURE — 99214 OFFICE O/P EST MOD 30 MIN: CPT | Performed by: PHYSICIAN ASSISTANT

## 2023-05-12 NOTE — PROGRESS NOTES
[facility]   Outpatient Sleep Medicine  May 12, 2023       Name: Nataly Kee MRN# [id number]   Age: 72 year old YOB: 1950            Assessment and Plan:   1. MIR (obstructive sleep apnea)  Patient's sleep apnea is adequately managed and well treated with current PAP settings [id number] with low residual AHI of 3.9 events per hour. Compliance is excellent.  Tolerating PAP well. Daytime symptoms and nighttime sleep quality are improved with use. No indication to adjust settings. Will continue nightly use. Patient reports her mask can push against her lip at night but not wanting to switch masks at this time, consider FFM if becomes more bothersome. Prescription updated for mask/supplies.   - Comprehensive DME    Nataly Kee will follow up in about 1-2 years for routine CPAP follow-up.        Chief Complaint      Chief Complaint   Patient presents with     Sleep Problem     MIR, concerned with mask pushing down on upper lip           History of Present Illness:     Nataly Kee is a 72 year old female with GERD, depression, history of PE, obesity who presents to the clinic for follow-up of their obstructive sleep apnea treated with CPAP.     Originally diagnosed via PSG on 4/19/21 that showed moderate obstructive sleep apnea with hypoxemia, worse in supine position (AHI 25.7, RDI 28.0, Supine AHI 74.0, REM AHI 8.2, Low O2 76.2%, 21 minutes with SpO2 <88%). Sleep architecture showed all sleep stages present with prolonged wakefulness and with sleep disruption attributable to respiratory events. Periodic limb movement activity with arousals (35.2 movements per hour, 16.3 arousals per hour). Patient weight 173lbs at time of study.      Following this study she elected treatment with CPAP. Set up with autoCPAP [id number] on 5/15/2021 through [facility] in [place].     Presents today for routine CPAP follow-up. Last visit 7/2021. Overall, the patient rates their experience with PAP as 9  "(0 poor, 10 great). Patient is using a nasal mask with chin strap. The mask is comfortable. The mask is leaking into eyes at times. They are not snoring with the mask on. They are not having gasp arousals.  They are not having significant oral/nasal dryness or epistaxis.The pressure settings are comfortable. Patient has no concerns with therapy today states \"I sleep great with it I sleep so well\"    Bedtime is typically 10:30-11:00pm. Usually it takes about few minutes to fall asleep with the mask on. Wake time varies but using PAP therapy 7 hours per night on average.     ResMed Auto-PAP 5-15 cmH2O download:  30 total days of use. 0 nonuse days. 30 days with >4 hours use.  Average use 7 hours 1 minutes per day. Median Leak 0 L/min. 95%ile Leak 8.9 L/min. CPAP 95% pressure 12.9cm. AHI 3.9    SCALES:   INSOMNIA: Insomnia Severity Score: 2   SLEEPINESS: [place] Sleepiness Score: 5    Past medical/surgical history, family history, social history, medications and allergies were reviewed.           Physical Examination:   /78   Pulse 62   Ht 1.651 m (5' 5\")   Wt 92.1 kg (203 lb)   LMP 09/03/2001   SpO2 96%   BMI 33.78 kg/m    General appearance: Awake, alert, cooperative. Well groomed. Sitting comfortably in chair. In no apparent distress.  HEENT: Head: Normocephalic, atraumatic. Eyes:Conjunctiva clear. Sclera normal. Nose: External appearance without deformity.   Pulmonary:  Able to speak easily in full sentences. No cough or wheeze.   Skin:  No rashes or significant lesions on visible skin.   Neurologic: Alert, oriented x3.   Psychiatric: Mood euthymic. Affect congruent with full range and intensity.      CC:  Toby Melendez PA-C  May 12, 2023     [facility]  [address] [place] [address]     [facility] [place]  [address] [address]    Chart documentation was completed, in part, with [employer] voice-recognition " software. Even though reviewed, some grammatical, spelling, and word errors may remain.    37 minutes spent on day of encounter doing chart review, history and exam, documentation, and further activities as noted above

## 2023-05-12 NOTE — NURSING NOTE
"Chief Complaint   Patient presents with     Sleep Problem     MIR, concerned with mask pushing down on upper lip        Initial /78   Pulse 62   Ht 1.651 m (5' 5\")   Wt 92.1 kg (203 lb)   LMP 09/03/2001   SpO2 96%   BMI 33.78 kg/m   Estimated body mass index is 33.78 kg/m  as calculated from the following:    Height as of this encounter: 1.651 m (5' 5\").    Weight as of this encounter: 92.1 kg (203 lb).    Medication Reconciliation: complete  ESS 5  FRANCIS 2  DME: SYLVIA Morgan MA      "

## 2023-05-15 DIAGNOSIS — Z86.711 HISTORY OF PULMONARY EMBOLISM: ICD-10-CM

## 2023-05-15 NOTE — TELEPHONE ENCOUNTER
Pt called asking for a refill for Eliquis since she will be out on 5/17/23. [facility]. Pt has OV on 5/19/23. Provider to send in the next script to [facility] for future refills.   KEELY López

## 2023-05-18 ENCOUNTER — HOSPITAL ENCOUNTER (OUTPATIENT)
Dept: CARDIOLOGY | Facility: CLINIC | Age: 73
Discharge: HOME | End: 2023-05-18
Attending: INTERNAL MEDICINE | Admitting: INTERNAL MEDICINE
Payer: COMMERCIAL

## 2023-05-18 DIAGNOSIS — R07.89 CHEST PRESSURE: ICD-10-CM

## 2023-05-18 PROCEDURE — 93325 DOPPLER ECHO COLOR FLOW MAPG: CPT | Mod: TC

## 2023-05-18 PROCEDURE — 93325 DOPPLER ECHO COLOR FLOW MAPG: CPT | Mod: 26 | Performed by: INTERNAL MEDICINE

## 2023-05-18 PROCEDURE — 93321 DOPPLER ECHO F-UP/LMTD STD: CPT | Mod: TC

## 2023-05-18 PROCEDURE — 93016 CV STRESS TEST SUPVJ ONLY: CPT | Performed by: INTERNAL MEDICINE

## 2023-05-18 PROCEDURE — 93018 CV STRESS TEST I&R ONLY: CPT | Performed by: INTERNAL MEDICINE

## 2023-05-18 PROCEDURE — 93350 STRESS TTE ONLY: CPT | Mod: 26 | Performed by: INTERNAL MEDICINE

## 2023-05-18 PROCEDURE — 255N000002 HC RX 255 OP 636: Performed by: INTERNAL MEDICINE

## 2023-05-18 PROCEDURE — 93321 DOPPLER ECHO F-UP/LMTD STD: CPT | Mod: 26 | Performed by: INTERNAL MEDICINE

## 2023-05-18 RX ADMIN — HUMAN ALBUMIN MICROSPHERES AND PERFLUTREN 4 ML: 10; .22 INJECTION, SOLUTION INTRAVENOUS at 10:37

## 2023-05-19 ENCOUNTER — OFFICE VISIT (OUTPATIENT)
Dept: CARDIOLOGY | Facility: CLINIC | Age: 73
End: 2023-05-19
Attending: INTERNAL MEDICINE
Payer: COMMERCIAL

## 2023-05-19 VITALS
OXYGEN SATURATION: 96 % | HEART RATE: 76 BPM | BODY MASS INDEX: 34.18 KG/M2 | WEIGHT: 205.4 LBS | SYSTOLIC BLOOD PRESSURE: 126 MMHG | DIASTOLIC BLOOD PRESSURE: 82 MMHG

## 2023-05-19 DIAGNOSIS — Z86.711 HISTORY OF PULMONARY EMBOLISM: ICD-10-CM

## 2023-05-19 DIAGNOSIS — E78.5 HYPERLIPIDEMIA LDL GOAL <100: ICD-10-CM

## 2023-05-19 DIAGNOSIS — R94.39 ABNORMAL STRESS TEST: ICD-10-CM

## 2023-05-19 DIAGNOSIS — I47.19 ATRIAL TACHYCARDIA (H): ICD-10-CM

## 2023-05-19 PROCEDURE — 99214 OFFICE O/P EST MOD 30 MIN: CPT | Performed by: NURSE PRACTITIONER

## 2023-05-19 RX ORDER — METOPROLOL SUCCINATE 25 MG/1
25 TABLET, EXTENDED RELEASE ORAL 2 TIMES DAILY
Qty: 180 TABLET | Refills: 3 | Status: SHIPPED | OUTPATIENT
Start: 2023-05-19 | End: 2024-07-23

## 2023-05-19 NOTE — PROGRESS NOTES
Cardiology Clinic Progress Note  Nataly Kee MRN# [id number]   YOB: 1950 Age: 72 year old     Reason For Visit:  Review stress echo results   Primary Cardiologist:   Dr. Martin          History of Presenting Illness:      Nataly Kee is a pleasant 72 year old patient who carries a past medical history significant for pulmonary embolus on chronic anticoagulation, paroxysmal atrial fibrillation, atrial tachycardia, dyslipidemia, and obesity.    She was last seen on 8/16/2022 by Dr. Martin for evaluation of atrial tachycardia and atypical chest discomfort.  She had previously been seen by Dr. Carlson who increased her Toprol to 25 mg twice daily.  Since then, she has had no further palpitations.      She recently notified our office after an episode of chest discomfort that woke her out of sleep.  Symptoms were similar to when she had her pulmonary embolism.  However, she has been on chronic anticoagulation.  To rule out ischemia, she underwent an stress echocardiogram that showed no ischemic EKG changes.  There is mild hypokinesis of the inferior lateral wall.  The LV cavity appears more dilated following stress compared to baseline, concerning for multivessel disease.  EF normal at 65 to 70% no significant valvular disease.     She returns to the office today for review of results.  She offers no cardiac complaint, denies chest pain, shortness of breath, palpitations, PND, orthopnea, presyncope, syncope, or leg edema.  She notes a complete resolution of symptoms after increasing her fluid intake.  Her primary complaint is difficulty with weight loss despite a strict exercise routine.  BMI 34.18, 205 pounds.     Blood pressure is well controlled at 126/82  Lipid panel showed total cholesterol 158, triglycerides 210, LDL 78, and HDL 38    Remains compliant with all medications.                   Assessment and Plan:       1.  Abnormal stress test -stress echo showed a mild hypokinesis of the inferior  lateral wall with LV cavity dilation following stress concerning for multivessel disease.  Results reviewed with Dr. Cuba in the absence of Dr. Martin.  Recommend coronary CTA for further evaluation.     2.  Paroxysmal atrial fibrillation -currently in normal sinus rhythm.  Continue on metoprolol and apixaban  3.  History of pulmonary embolus -on chronic anticoagulation  4.  Hyperlipidemia -LDL 78, continue atorvastatin                Thank you for allowing me to participate in this delightful patient's care.   Further recommendations following results of coronary CTA.    Belinda Yoder, APRN CNP         Review of Systems:     Review of Systems:  Skin:        Eyes:       ENT:       Respiratory:  Positive for sleep apnea;CPAP  Cardiovascular:    Positive for  Gastroenterology:      Genitourinary:       Musculoskeletal:       Neurologic:       Psychiatric:       Heme/Lymph/Imm:       Endocrine:                   Physical Exam:     GEN:  In general, this is a overweight female in no acute distress.  HEENT:  Pupils equal, round. Sclerae nonicteric. Clear oropharynx. Mucous membranes moist.  NECK:   No JVD   C/V:  Regular rate and rhythm, no murmur, rub or gallop. No S3 or RV heave.   RESP: Respirations are unlabored. No use of accessory muscles. Clear to auscultation bilaterally without wheezing, rales, or rhonchi.  GI: Not assessed.   EXTREM: No LE edema. No cyanosis or clubbing.  NEURO: Alert and oriented, cooperative. .  PSYCH: Normal affect.  SKIN: Warm and dry. No rashes or petechiae appreciated.          Past Medical History:     Past Medical History:   Diagnosis Date     Depression      Depressive disorder      GERD (gastroesophageal reflux disease)               Past Surgical History:     Past Surgical History:   Procedure Laterality Date     COLONOSCOPY  04/01/2015    tubular adenoma     HC REMOVAL OF TONSILS,12+ Y/O      age 20     IR PULMONARY ANGIOGRAM BILATERAL  2/17/2022     ZZC LIGATE FALLOPIAN TUBE   01/01/1983     ZZC VAGINAL HYSTERECTOMY  01/01/2001    Hysterectomy, Vaginal - fibroids     ZZHC COLONOSCOPY THRU STOMA, DIAGNOSTIC  08/01/2010    3 mm polyp in descending colon/ Dr Asencio              Allergies:   No known drug allergy       Data:   All laboratory data reviewed:    LAST CHOLESTEROL:  Lab Results   Component Value Date    CHOL 158 01/31/2023     Lab Results   Component Value Date    HDL 38 01/31/2023     Lab Results   Component Value Date    LDL 78 01/31/2023     04/20/2018     Lab Results   Component Value Date    TRIG 210 01/31/2023     Lab Results   Component Value Date    CHOLHDLRATIO 4 01/31/2023       LAST BMP:  Lab Results   Component Value Date    .1 01/31/2023      Lab Results   Component Value Date    POTASSIUM 4.96 01/31/2023     Lab Results   Component Value Date    CHLORIDE 105.2 01/31/2023     Lab Results   Component Value Date    JOSE L 9.5 01/31/2023     Lab Results   Component Value Date    CO2 25.9 01/31/2023     Lab Results   Component Value Date    BUN 13 01/31/2023    BUN 15.5 01/31/2023     Lab Results   Component Value Date    CR 0.84 01/31/2023     Lab Results   Component Value Date     01/31/2023       LAST CBC:  Lab Results   Component Value Date    WBC 6.3 08/17/2022     Lab Results   Component Value Date    RBC 4.94 08/17/2022     Lab Results   Component Value Date    HGB 12.6 08/17/2022     Lab Results   Component Value Date    HCT 40.2 08/17/2022     Lab Results   Component Value Date    MCV 81.3 08/17/2022     Lab Results   Component Value Date    MCH 25.5 08/17/2022     Lab Results   Component Value Date    MCHC 31.3 08/17/2022     Lab Results   Component Value Date    RDW 18.1 08/17/2022     Lab Results   Component Value Date     08/17/2022

## 2023-05-19 NOTE — LETTER
5/19/2023    Toby Melendez PA-C  [address]  [facility] [address]    RE: Nataly Kee       Dear Colleague,     I had the pleasure of seeing Nataly Kee in the [facility].  Cardiology Clinic Progress Note  Nataly Kee MRN# [id number]   YOB: 1950 Age: 72 year old     Reason For Visit:  Review stress echo results   Primary Cardiologist:   Dr. Martin          History of Presenting Illness:      Nataly Kee is a pleasant 72 year old patient who carries a past medical history significant for pulmonary embolus on chronic anticoagulation, paroxysmal atrial fibrillation, atrial tachycardia, dyslipidemia, and obesity.    She was last seen on 8/16/2022 by Dr. Martin for evaluation of atrial tachycardia and atypical chest discomfort.  She had previously been seen by Dr. Carlson who increased her Toprol to 25 mg twice daily.  Since then, she has had no further palpitations.      She recently notified our office after an episode of chest discomfort that woke her out of sleep.  Symptoms were similar to when she had her pulmonary embolism.  However, she has been on chronic anticoagulation.  To rule out ischemia, she underwent an stress echocardiogram that showed no ischemic EKG changes.  There is mild hypokinesis of the inferior lateral wall.  The LV cavity appears more dilated following stress compared to baseline, concerning for multivessel disease.  EF normal at 65 to 70% no significant valvular disease.     She returns to the office today for review of results.  She offers no cardiac complaint, denies chest pain, shortness of breath, palpitations, PND, orthopnea, presyncope, syncope, or leg edema.  She notes a complete resolution of symptoms after increasing her fluid intake.  Her primary complaint is difficulty with weight loss despite a strict exercise routine.  BMI 34.18, 205 pounds.     Blood pressure is well controlled at 126/82  Lipid panel showed total cholesterol  158, triglycerides 210, LDL 78, and HDL 38    Remains compliant with all medications.                   Assessment and Plan:       1.  Abnormal stress test -stress echo showed a mild hypokinesis of the inferior lateral wall with LV cavity dilation following stress concerning for multivessel disease.  Results reviewed with Dr. Cuba in the absence of Dr. Martin.  Recommend coronary CTA for further evaluation.     2.  Paroxysmal atrial fibrillation -currently in normal sinus rhythm.  Continue on metoprolol and apixaban  3.  History of pulmonary embolus -on chronic anticoagulation  4.  Hyperlipidemia -LDL 78, continue atorvastatin                Thank you for allowing me to participate in this delightful patient's care.   Further recommendations following results of coronary CTA.    Belinda Yoder, APRN CNP         Review of Systems:     Review of Systems:  Skin:        Eyes:       ENT:       Respiratory:  Positive for sleep apnea;CPAP  Cardiovascular:    Positive for  Gastroenterology:      Genitourinary:       Musculoskeletal:       Neurologic:       Psychiatric:       Heme/Lymph/Imm:       Endocrine:                   Physical Exam:     GEN:  In general, this is a overweight female in no acute distress.  HEENT:  Pupils equal, round. Sclerae nonicteric. Clear oropharynx. Mucous membranes moist.  NECK:   No JVD   C/V:  Regular rate and rhythm, no murmur, rub or gallop. No S3 or RV heave.   RESP: Respirations are unlabored. No use of accessory muscles. Clear to auscultation bilaterally without wheezing, rales, or rhonchi.  GI: Not assessed.   EXTREM: No LE edema. No cyanosis or clubbing.  NEURO: Alert and oriented, cooperative. .  PSYCH: Normal affect.  SKIN: Warm and dry. No rashes or petechiae appreciated.          Past Medical History:     Past Medical History:   Diagnosis Date    Depression     Depressive disorder     GERD (gastroesophageal reflux disease)               Past Surgical History:     Past Surgical  History:   Procedure Laterality Date    COLONOSCOPY  04/01/2015    tubular adenoma    HC REMOVAL OF TONSILS,12+ Y/O      age 20    IR PULMONARY ANGIOGRAM BILATERAL  2/17/2022    ZZC LIGATE FALLOPIAN TUBE  01/01/1983    ZZC VAGINAL HYSTERECTOMY  01/01/2001    Hysterectomy, Vaginal - fibroids    ZZHC COLONOSCOPY THRU STOMA, DIAGNOSTIC  08/01/2010    3 mm polyp in descending colon/ Dr Asencio              Allergies:   No known drug allergy       Data:   All laboratory data reviewed:    LAST CHOLESTEROL:  Lab Results   Component Value Date    CHOL 158 01/31/2023     Lab Results   Component Value Date    HDL 38 01/31/2023     Lab Results   Component Value Date    LDL 78 01/31/2023     04/20/2018     Lab Results   Component Value Date    TRIG 210 01/31/2023     Lab Results   Component Value Date    CHOLHDLRATIO 4 01/31/2023       LAST BMP:  Lab Results   Component Value Date    .1 01/31/2023      Lab Results   Component Value Date    POTASSIUM 4.96 01/31/2023     Lab Results   Component Value Date    CHLORIDE 105.2 01/31/2023     Lab Results   Component Value Date    JOSE L 9.5 01/31/2023     Lab Results   Component Value Date    CO2 25.9 01/31/2023     Lab Results   Component Value Date    BUN 13 01/31/2023    BUN 15.5 01/31/2023     Lab Results   Component Value Date    CR 0.84 01/31/2023     Lab Results   Component Value Date     01/31/2023       LAST CBC:  Lab Results   Component Value Date    WBC 6.3 08/17/2022     Lab Results   Component Value Date    RBC 4.94 08/17/2022     Lab Results   Component Value Date    HGB 12.6 08/17/2022     Lab Results   Component Value Date    HCT 40.2 08/17/2022     Lab Results   Component Value Date    MCV 81.3 08/17/2022     Lab Results   Component Value Date    MCH 25.5 08/17/2022     Lab Results   Component Value Date    MCHC 31.3 08/17/2022     Lab Results   Component Value Date    RDW 18.1 08/17/2022     Lab Results   Component Value Date     08/17/2022                Thank you for allowing me to participate in the care of your patient.      Sincerely,     WESLY Eason CNP     [facility]  cc:   Alphonso Martin MD  [address]  [place] [id number]

## 2023-05-19 NOTE — PATIENT INSTRUCTIONS
Thanks for participating in a office visit with the [facility] today.    Recent stress echocardiogram shows a mild abnormality concerning for possible coronary disease.   Recommend a coronary CTA  for further evaluation.   Blood pressures and heart rates well controlled   Continue current medical therapy.       Please call my nurse at  [phone] with any questions or concerns.    Scheduling phone number: [phone]  Reminder: Please bring in all current medications, over the counter supplements and vitamin bottles to your next appointment.

## 2023-05-21 ENCOUNTER — HEALTH MAINTENANCE LETTER (OUTPATIENT)
Age: 73
End: 2023-05-21

## 2023-06-02 ENCOUNTER — HOSPITAL ENCOUNTER (OUTPATIENT)
Dept: CARDIOLOGY | Facility: CLINIC | Age: 73
Discharge: HOME | End: 2023-06-02
Attending: NURSE PRACTITIONER | Admitting: NURSE PRACTITIONER
Payer: COMMERCIAL

## 2023-06-02 VITALS — DIASTOLIC BLOOD PRESSURE: 87 MMHG | HEART RATE: 64 BPM | SYSTOLIC BLOOD PRESSURE: 136 MMHG

## 2023-06-02 DIAGNOSIS — R94.39 ABNORMAL STRESS TEST: ICD-10-CM

## 2023-06-02 LAB
CREAT BLD-MCNC: 0.9 MG/DL (ref 0.5–1)
GFR SERPL CREATININE-BSD FRML MDRD: >60 ML/MIN/1.73M2

## 2023-06-02 PROCEDURE — 75574 CT ANGIO HRT W/3D IMAGE: CPT | Mod: 26 | Performed by: INTERNAL MEDICINE

## 2023-06-02 PROCEDURE — 82565 ASSAY OF CREATININE: CPT

## 2023-06-02 PROCEDURE — 250N000013 HC RX MED GY IP 250 OP 250 PS 637: Performed by: NURSE PRACTITIONER

## 2023-06-02 PROCEDURE — 250N000011 HC RX IP 250 OP 636: Performed by: NURSE PRACTITIONER

## 2023-06-02 PROCEDURE — 75574 CT ANGIO HRT W/3D IMAGE: CPT

## 2023-06-02 RX ORDER — DILTIAZEM HYDROCHLORIDE 5 MG/ML
10-15 INJECTION INTRAVENOUS
Status: DISCONTINUED | OUTPATIENT
Start: 2023-06-02 | End: 2023-06-03 | Stop reason: HOSPADM

## 2023-06-02 RX ORDER — METOPROLOL TARTRATE 25 MG/1
25-100 TABLET, FILM COATED ORAL
Status: COMPLETED | OUTPATIENT
Start: 2023-06-02 | End: 2023-06-02

## 2023-06-02 RX ORDER — METOPROLOL TARTRATE 1 MG/ML
5-15 INJECTION, SOLUTION INTRAVENOUS
Status: DISCONTINUED | OUTPATIENT
Start: 2023-06-02 | End: 2023-06-03 | Stop reason: HOSPADM

## 2023-06-02 RX ORDER — IOPAMIDOL 755 MG/ML
500 INJECTION, SOLUTION INTRAVASCULAR ONCE
Status: COMPLETED | OUTPATIENT
Start: 2023-06-02 | End: 2023-06-02

## 2023-06-02 RX ORDER — METHYLPREDNISOLONE SODIUM SUCCINATE 125 MG/2ML
125 INJECTION, POWDER, LYOPHILIZED, FOR SOLUTION INTRAMUSCULAR; INTRAVENOUS
Status: DISCONTINUED | OUTPATIENT
Start: 2023-06-02 | End: 2023-06-03 | Stop reason: HOSPADM

## 2023-06-02 RX ORDER — ACYCLOVIR 200 MG/1
0-1 CAPSULE ORAL
Status: DISCONTINUED | OUTPATIENT
Start: 2023-06-02 | End: 2023-06-03 | Stop reason: HOSPADM

## 2023-06-02 RX ORDER — DIPHENHYDRAMINE HYDROCHLORIDE 50 MG/ML
25-50 INJECTION INTRAMUSCULAR; INTRAVENOUS
Status: DISCONTINUED | OUTPATIENT
Start: 2023-06-02 | End: 2023-06-03 | Stop reason: HOSPADM

## 2023-06-02 RX ORDER — DILTIAZEM HCL 60 MG
120 TABLET ORAL
Status: DISCONTINUED | OUTPATIENT
Start: 2023-06-02 | End: 2023-06-03 | Stop reason: HOSPADM

## 2023-06-02 RX ORDER — DIPHENHYDRAMINE HCL 25 MG
25 CAPSULE ORAL
Status: DISCONTINUED | OUTPATIENT
Start: 2023-06-02 | End: 2023-06-03 | Stop reason: HOSPADM

## 2023-06-02 RX ORDER — NITROGLYCERIN 0.4 MG/1
0.4 TABLET SUBLINGUAL
Status: DISCONTINUED | OUTPATIENT
Start: 2023-06-02 | End: 2023-06-03 | Stop reason: HOSPADM

## 2023-06-02 RX ORDER — IVABRADINE 5 MG/1
5-15 TABLET, FILM COATED ORAL
Status: COMPLETED | OUTPATIENT
Start: 2023-06-02 | End: 2023-06-02

## 2023-06-02 RX ORDER — ONDANSETRON 2 MG/ML
4 INJECTION INTRAMUSCULAR; INTRAVENOUS
Status: DISCONTINUED | OUTPATIENT
Start: 2023-06-02 | End: 2023-06-03 | Stop reason: HOSPADM

## 2023-06-02 RX ADMIN — METOPROLOL TARTRATE 50 MG: 50 TABLET, FILM COATED ORAL at 10:49

## 2023-06-02 RX ADMIN — IVABRADINE 5 MG: 5 TABLET, FILM COATED ORAL at 10:48

## 2023-06-02 RX ADMIN — IOPAMIDOL 120 ML: 755 INJECTION, SOLUTION INTRAVENOUS at 12:19

## 2023-06-02 RX ADMIN — NITROGLYCERIN 0.4 MG: 0.4 TABLET SUBLINGUAL at 12:07

## 2023-07-05 ENCOUNTER — TELEPHONE (OUTPATIENT)
Dept: FAMILY MEDICINE | Facility: CLINIC | Age: 73
End: 2023-07-05

## 2023-07-05 NOTE — TELEPHONE ENCOUNTER
Pt had a CT June 5th. She is wondering what type of follow up she needs for her pulmonary nodule.    Please advise if she needs a 12 month F/U and will let her know to make an OV around that time.    [phone] (home)

## 2023-07-27 ENCOUNTER — TRANSFERRED RECORDS (OUTPATIENT)
Dept: FAMILY MEDICINE | Facility: CLINIC | Age: 73
End: 2023-07-27

## 2023-09-19 ENCOUNTER — OFFICE VISIT (OUTPATIENT)
Dept: CARDIOLOGY | Facility: CLINIC | Age: 73
End: 2023-09-19
Payer: COMMERCIAL

## 2023-09-19 VITALS
WEIGHT: 204 LBS | DIASTOLIC BLOOD PRESSURE: 74 MMHG | OXYGEN SATURATION: 95 % | HEIGHT: 65 IN | SYSTOLIC BLOOD PRESSURE: 120 MMHG | BODY MASS INDEX: 33.99 KG/M2 | HEART RATE: 60 BPM

## 2023-09-19 DIAGNOSIS — I26.09 ACUTE PULMONARY EMBOLISM WITH ACUTE COR PULMONALE, UNSPECIFIED PULMONARY EMBOLISM TYPE (H): ICD-10-CM

## 2023-09-19 DIAGNOSIS — E78.5 DYSLIPIDEMIA: ICD-10-CM

## 2023-09-19 DIAGNOSIS — I47.19 ATRIAL TACHYCARDIA (H): ICD-10-CM

## 2023-09-19 PROCEDURE — 99214 OFFICE O/P EST MOD 30 MIN: CPT | Performed by: INTERNAL MEDICINE

## 2023-09-19 PROCEDURE — 93005 ELECTROCARDIOGRAM TRACING: CPT | Performed by: INTERNAL MEDICINE

## 2023-09-19 NOTE — LETTER
9/19/2023    Toby Melendez PA-C  [address]  [address]  [facility] [address]    RE: Nataly MARIN Vidhya       Dear Colleague,     I had the pleasure of seeing Nataly Kee in the [facility].  HISTORY OF PRESENT ILLNESS:  Nataly Kee, a 73-year-old woman with a history of pulmonary embolus (on chronic apixaban), paroxysmal atrial tachycardia (on beta blockers), dyslipidemia, nonobstructive CAD and obesity, was seen today at your request for followup.      Since I last saw the patient on 8/16/2022, she has been free of recurrent palpitation.  She was seen for atypical chest pain in May 2023 and underwent a stress echocardiogram during which she did not develop symptoms or EKG changes, but imaging was technically difficult.  Ultimately, she underwent a coronary CTA scan that showed nonobstructive coronary disease.  Has been free of chest pain symptoms since then.    Patient remains on chronic anticoagulation in view of a previous history of pulmonary embolus.  She would prefer to be seen on a as needed basis    Past Medical History     10  History of unprovoked pulmonary embolus, on chronic apixaban.  Echocardiogram 04/2021 showing normal biventricular chamber size and systolic performance with normal estimated pulmonary pressures.  2.  Dyslipidemia, now is agreeable to statin therapy.  3.  Obesity.  4.  Paroxysmal atrial tachycardia, on metoprolol XL 25 mg b.i.d. with good control.  5.  Gastroesophageal reflux disease, on omeprazole.  6. Nonobstructive CAD : Documented with CT coronary angiogram        Orders this Visit:  No orders of the defined types were placed in this encounter.    Orders Placed This Encounter   Medications    ferrous sulfate (SLO-FE) 142 (45 Fe) MG CR tablet     Sig: Take 142 mg by mouth daily     There are no discontinued medications.    Encounter Diagnoses   Name Primary?    Atrial tachycardia (H)     Acute pulmonary embolism with acute cor pulmonale, unspecified pulmonary  "embolism type (H)     Dyslipidemia        CURRENT MEDICATIONS:  Current Outpatient Medications   Medication Sig Dispense Refill    apixaban ANTICOAGULANT (ELIQUIS) 5 MG tablet Take 1 tablet (5 mg) by mouth 2 times daily 180 tablet 3    atorvastatin (LIPITOR) 40 MG tablet Take 1 tablet (40 mg) by mouth daily 90 tablet 3    Cholecalciferol (VITAMIN D) 125 MCG (5000 UT) capsule Take 1 capsule by mouth daily 2000 international unit(s)      ferrous sulfate (SLO-FE) 142 (45 Fe) MG CR tablet Take 142 mg by mouth daily      FLUoxetine (PROZAC) 20 MG capsule TAKE 1 CAPSULE BY MOUTH EVERY DAY 90 capsule 3    metoprolol succinate ER (TOPROL XL) 25 MG 24 hr tablet Take 1 tablet (25 mg) by mouth 2 times daily 180 tablet 3    omeprazole (PRILOSEC) 20 MG DR capsule Take 1 capsule (20 mg) by mouth daily 90 capsule 3    valACYclovir (VALTREX) 1000 mg tablet TAKE 2 TABS BY MOUTH AT ONSET AND 2 TABS 12 HOURS LATER         ALLERGIES     Allergies   Allergen Reactions    No Known Drug Allergy        PAST MEDICAL, SURGICAL, FAMILY, SOCIAL HISTORY:  History was reviewed and updated as needed, see medical record.    Review of Systems:  A 12-point review of systems was completed, see medical record for detailed review of systems information.    Physical Exam:  Vitals: /74 (BP Location: Right arm, Patient Position: Sitting, Cuff Size: Adult Large)   Pulse 60   Ht 1.651 m (5' 5\")   Wt 92.5 kg (204 lb)   LMP 09/03/2001   SpO2 95%   BMI 33.95 kg/m      Pleasant, cooperative, overweight  Lungs clear to percussion auscultation  Cardiovascular normal S1 normal S2 there is no S3 there is no murmur or click    ASSESSMENT: The patient remains in a sinus rhythm without recurrent pulmonary embolus or symptomatic tachycardia.  She would benefit from a Mediterranean-style weight loss diet and a regular exercise program.  In view of her nonobstructive coronary disease, she should remain on statin therapy.  The patient would prefer to be seen " on a as needed basis.  We will be happy to see her any point in the future should your request otherwise       RECOMMENDATIONS:   1) Follow up prn  2) Regular exercise program   3) Mediterranean style weight loss diet  4) Continue present medications        Recent Lab Results:  LIPID RESULTS:  Lab Results   Component Value Date    CHOL 158 01/31/2023    HDL 38 (A) 01/31/2023    LDL 78 01/31/2023     (H) 04/20/2018    TRIG 210 (A) 01/31/2023    CHOLHDLRATIO 4 01/31/2023       LIVER ENZYME RESULTS:  Lab Results   Component Value Date    AST 10 02/19/2022    AST 22 08/05/2013    ALT 11 02/19/2022    ALT 22 08/05/2013       CBC RESULTS:  Lab Results   Component Value Date    WBC 6.3 08/17/2022    RBC 4.94 08/17/2022    HGB 12.6 08/17/2022    HCT 40.2 08/17/2022    MCV 81.3 08/17/2022    MCH 25.5 (A) 08/17/2022    MCHC 31.3 08/17/2022    RDW 18.1 08/17/2022     08/17/2022       BMP RESULTS:  Lab Results   Component Value Date    .1 01/31/2023    POTASSIUM 4.96 01/31/2023    CHLORIDE 105.2 01/31/2023    CO2 25.9 01/31/2023    ANIONGAP 6 02/19/2022     (A) 01/31/2023    BUN 13 01/31/2023    BUN 15.5 01/31/2023    CR 0.9 06/02/2023    CR 0.84 01/31/2023    GFRESTIMATED >60 06/02/2023    GFRESTIMATED 85 11/09/2016    JOSE L 9.5 01/31/2023        A1C RESULTS:  No results found for: A1C    INR RESULTS:  Lab Results   Component Value Date    INR 1.13 02/17/2022    INR 1.7 02/19/2002    INR 2.2 01/22/2002       We greatly appreciate the opportunity to be involved in the care of your patient, Nataly Kee.    Sincerely,  Alphonso Martin MD      CC  No referring provider defined for this encounter.

## 2023-09-19 NOTE — PROGRESS NOTES
HISTORY OF PRESENT ILLNESS:  Nataly Kee, a 73-year-old woman with a history of pulmonary embolus (on chronic apixaban), paroxysmal atrial tachycardia (on beta blockers), dyslipidemia, nonobstructive CAD and obesity, was seen today at your request for followup.      Since I last saw the patient on 8/16/2022, she has been free of recurrent palpitation.  She was seen for atypical chest pain in May 2023 and underwent a stress echocardiogram during which she did not develop symptoms or EKG changes, but imaging was technically difficult.  Ultimately, she underwent a coronary CTA scan that showed nonobstructive coronary disease.  Has been free of chest pain symptoms since then.    Patient remains on chronic anticoagulation in view of a previous history of pulmonary embolus.  She would prefer to be seen on a as needed basis    Past Medical History     10  History of unprovoked pulmonary embolus, on chronic apixaban.  Echocardiogram 04/2021 showing normal biventricular chamber size and systolic performance with normal estimated pulmonary pressures.  2.  Dyslipidemia, now is agreeable to statin therapy.  3.  Obesity.  4.  Paroxysmal atrial tachycardia, on metoprolol XL 25 mg b.i.d. with good control.  5.  Gastroesophageal reflux disease, on omeprazole.  6. Nonobstructive CAD : Documented with CT coronary angiogram        Orders this Visit:  No orders of the defined types were placed in this encounter.    Orders Placed This Encounter   Medications    ferrous sulfate (SLO-FE) 142 (45 Fe) MG CR tablet     Sig: Take 142 mg by mouth daily     There are no discontinued medications.    Encounter Diagnoses   Name Primary?    Atrial tachycardia (H)     Acute pulmonary embolism with acute cor pulmonale, unspecified pulmonary embolism type (H)     Dyslipidemia        CURRENT MEDICATIONS:  Current Outpatient Medications   Medication Sig Dispense Refill    apixaban ANTICOAGULANT (ELIQUIS) 5 MG tablet Take 1 tablet (5 mg) by mouth 2 times  "daily 180 tablet 3    atorvastatin (LIPITOR) 40 MG tablet Take 1 tablet (40 mg) by mouth daily 90 tablet 3    Cholecalciferol (VITAMIN D) 125 MCG (5000 UT) capsule Take 1 capsule by mouth daily 2000 international unit(s)      ferrous sulfate (SLO-FE) 142 (45 Fe) MG CR tablet Take 142 mg by mouth daily      FLUoxetine (PROZAC) 20 MG capsule TAKE 1 CAPSULE BY MOUTH EVERY DAY 90 capsule 3    metoprolol succinate ER (TOPROL XL) 25 MG 24 hr tablet Take 1 tablet (25 mg) by mouth 2 times daily 180 tablet 3    omeprazole (PRILOSEC) 20 MG DR capsule Take 1 capsule (20 mg) by mouth daily 90 capsule 3    valACYclovir (VALTREX) 1000 mg tablet TAKE 2 TABS BY MOUTH AT ONSET AND 2 TABS 12 HOURS LATER         ALLERGIES     Allergies   Allergen Reactions    No Known Drug Allergy        PAST MEDICAL, SURGICAL, FAMILY, SOCIAL HISTORY:  History was reviewed and updated as needed, see medical record.    Review of Systems:  A 12-point review of systems was completed, see medical record for detailed review of systems information.    Physical Exam:  Vitals: /74 (BP Location: Right arm, Patient Position: Sitting, Cuff Size: Adult Large)   Pulse 60   Ht 1.651 m (5' 5\")   Wt 92.5 kg (204 lb)   LMP 09/03/2001   SpO2 95%   BMI 33.95 kg/m      Pleasant, cooperative, overweight  Lungs clear to percussion auscultation  Cardiovascular normal S1 normal S2 there is no S3 there is no murmur or click    ASSESSMENT: The patient remains in a sinus rhythm without recurrent pulmonary embolus or symptomatic tachycardia.  She would benefit from a Mediterranean-style weight loss diet and a regular exercise program.  In view of her nonobstructive coronary disease, she should remain on statin therapy.  The patient would prefer to be seen on a as needed basis.  We will be happy to see her any point in the future should your request otherwise       RECOMMENDATIONS:   1) Follow up prn  2) Regular exercise program   3) Mediterranean style weight loss " diet  4) Continue present medications        Recent Lab Results:  LIPID RESULTS:  Lab Results   Component Value Date    CHOL 158 01/31/2023    HDL 38 (A) 01/31/2023    LDL 78 01/31/2023     (H) 04/20/2018    TRIG 210 (A) 01/31/2023    CHOLHDLRATIO 4 01/31/2023       LIVER ENZYME RESULTS:  Lab Results   Component Value Date    AST 10 02/19/2022    AST 22 08/05/2013    ALT 11 02/19/2022    ALT 22 08/05/2013       CBC RESULTS:  Lab Results   Component Value Date    WBC 6.3 08/17/2022    RBC 4.94 08/17/2022    HGB 12.6 08/17/2022    HCT 40.2 08/17/2022    MCV 81.3 08/17/2022    MCH 25.5 (A) 08/17/2022    MCHC 31.3 08/17/2022    RDW 18.1 08/17/2022     08/17/2022       BMP RESULTS:  Lab Results   Component Value Date    .1 01/31/2023    POTASSIUM 4.96 01/31/2023    CHLORIDE 105.2 01/31/2023    CO2 25.9 01/31/2023    ANIONGAP 6 02/19/2022     (A) 01/31/2023    BUN 13 01/31/2023    BUN 15.5 01/31/2023    CR 0.9 06/02/2023    CR 0.84 01/31/2023    GFRESTIMATED >60 06/02/2023    GFRESTIMATED 85 11/09/2016    JOSE L 9.5 01/31/2023        A1C RESULTS:  No results found for: A1C    INR RESULTS:  Lab Results   Component Value Date    INR 1.13 02/17/2022    INR 1.7 02/19/2002    INR 2.2 01/22/2002       We greatly appreciate the opportunity to be involved in the care of your patient, Nataly Kee.    Sincerely,  Alphonso Martin MD      CC  No referring provider defined for this encounter.

## 2023-10-10 ENCOUNTER — OFFICE VISIT (OUTPATIENT)
Dept: FAMILY MEDICINE | Facility: CLINIC | Age: 73
End: 2023-10-10

## 2023-10-10 VITALS
WEIGHT: 204 LBS | HEART RATE: 72 BPM | TEMPERATURE: 98 F | DIASTOLIC BLOOD PRESSURE: 72 MMHG | RESPIRATION RATE: 20 BRPM | SYSTOLIC BLOOD PRESSURE: 110 MMHG | BODY MASS INDEX: 33.95 KG/M2 | OXYGEN SATURATION: 97 %

## 2023-10-10 DIAGNOSIS — E03.8 SUBCLINICAL HYPOTHYROIDISM: ICD-10-CM

## 2023-10-10 DIAGNOSIS — Z86.2 HISTORY OF ANEMIA: ICD-10-CM

## 2023-10-10 DIAGNOSIS — M85.89 OSTEOPENIA OF MULTIPLE SITES: ICD-10-CM

## 2023-10-10 DIAGNOSIS — R53.83 OTHER FATIGUE: Primary | ICD-10-CM

## 2023-10-10 LAB
ERYTHROCYTE [DISTWIDTH] IN BLOOD BY AUTOMATED COUNT: 16.4 %
HCT VFR BLD AUTO: 44.6 % (ref 35–47)
HEMOGLOBIN: 14.3 G/DL (ref 11.7–15.7)
MCH RBC QN AUTO: 28.8 PG (ref 26–33)
MCHC RBC AUTO-ENTMCNC: 32.1 G/DL (ref 31–36)
MCV RBC AUTO: 89.8 FL (ref 78–100)
PLATELET COUNT - QUEST: 263 10^9/L (ref 150–375)
RBC # BLD AUTO: 4.97 10*12/L (ref 3.8–5.2)
WBC # BLD AUTO: 6.4 10*9/L (ref 4–11)

## 2023-10-10 PROCEDURE — 85027 COMPLETE CBC AUTOMATED: CPT | Performed by: PHYSICIAN ASSISTANT

## 2023-10-10 PROCEDURE — 36415 COLL VENOUS BLD VENIPUNCTURE: CPT | Performed by: PHYSICIAN ASSISTANT

## 2023-10-10 PROCEDURE — 99213 OFFICE O/P EST LOW 20 MIN: CPT | Performed by: PHYSICIAN ASSISTANT

## 2023-10-10 NOTE — PROGRESS NOTES
CC: Fatigue    History:  Fatigue, subclinical hypothyroid:  Has been noticing more fatigue in general, as well as difficult with weight loss despite working with . Admits she could be better with nutrition.     Osteopenia:  Had updated dexa 2021 that showed worsening osteopenia. Should repeat in 2-3 years.    Anemia:  Has been working with hematologist since significant PE 2/2022. Recent labs 7/2023 showed she should be back on oral iron which is somewhat constipating for her. Hematology said she could follow-up with PCP.     PMH, MEDICATIONS, ALLERGIES, SOCIAL AND FAMILY HISTORY in [facility] and reviewed by me personally.    ROS negative other than the symptoms noted above in the HPI.    Examination   /72 (BP Location: Right arm, Patient Position: Sitting, Cuff Size: Adult Large)   Pulse 72   Temp 98  F (36.7  C) (Temporal)   Resp 20   Wt 92.5 kg (204 lb)   LMP 09/03/2001   SpO2 97%   BMI 33.95 kg/m       Constitutional: Sitting comfortably, in no acute distress. Vital signs noted  Neck:  no adenopathy, trachea midline and normal to palpation, thyroid normal to palpation  Cardiovascular:  regular rate and rhythm, no murmurs, clicks, or gallops  Respiratory:  normal respiratory rate and rhythm, lungs clear to auscultation  SKIN: No jaundice/pallor/rash.   Psychiatric: mentation appears normal and affect normal/bright      A/P    ICD-10-CM    1. Other fatigue  R53.83 VENOUS COLLECTION     TSH WITH FREE T4 REFLEX ([facility])     Hemogram Platelet ([facility])     IRON AND IRON BINDING CAPACITY ([facility])     FERRITIN ([facility])     CANCELED: Iron, TIBC and Ferritin ([facility])      2. Subclinical hypothyroidism  E03.8 VENOUS COLLECTION     TSH WITH FREE T4 REFLEX ([facility])     Thyroid Peroxidase and Thyroglobulin Antibodies ([facility])      3. History of anemia  Z86.2 VENOUS COLLECTION     Hemogram Platelet ([facility])     IRON AND IRON BINDING CAPACITY ([facility])     FERRITIN ([facility])     CANCELED: Iron, TIBC and Ferritin ([facility])           DISCUSSION:  Fatigue, subclinical hypothyroid:  Agreed to check TSH as well as thyroid antibodies. [facility]     Osteopenia:  Agreed to recheck next year.    Anemia:  Will get 7/2023 lab results from hematology. Will update iron studies today.     follow up visit: As needed    Manuela Barone PA-C  [facility]

## 2023-10-10 NOTE — NURSING NOTE
Chief Complaint   Patient presents with    Consult For     Feels that her thyroid may be off, she has read in her chart that she has hypothyroidism but has not fully discussed this with a provider, has been feeling more fatigue, knows that her iron is off     Pre-visit Screening:  Immunizations:  up to date  Colonoscopy:  is up to date  Mammogram: is up to date  Asthma Action Test/Plan:  pj  PHQ9:  na  GAD7:  na  Questioned patient about current smoking habits Pt. quit smoking some time ago.  Ok to leave detailed message on voice mail for today's visit only yes, phone # [phone] (home)

## 2023-10-11 LAB
% SATURATION - QUEST: 16 % (CALC) (ref 16–45)
FERRITIN SERPL-MCNC: 30 NG/ML (ref 16–288)
IRON: 61 MCG/DL (ref 45–160)
THYROGLOBULIN ANTIBODY: <1 IU/ML (ref 0–40)
THYROID PEROXIDASE AB: 47 IU/ML
TIBC - QUEST: 386 MCG/DL (CALC) (ref 250–450)
TSH SERPL-ACNC: 4.22 MIU/L (ref 0.4–4.5)

## 2023-11-29 ENCOUNTER — DOCUMENTATION ONLY (OUTPATIENT)
Dept: SLEEP MEDICINE | Facility: CLINIC | Age: 73
End: 2023-11-29
Payer: COMMERCIAL

## 2023-11-29 DIAGNOSIS — G47.33 OBSTRUCTIVE SLEEP APNEA (ADULT) (PEDIATRIC): Primary | ICD-10-CM

## 2023-11-29 NOTE — PROGRESS NOTES
Date: 11.29.23  Location Of Mask Fitting: [place]  Reason For Mask Fitting: Mask Causing Puffiness Around Lips  Discussed/Viewed The Following Masks: Rm Airfit/Touch F20    Mask And Size Selected: Rm Airfit N20 Standard Small  Mask Clarification Needed: Yes    Does Patient Wear A Medical Device That Will Be Affected By The Respironics Or Resmed Magnetic Mask Contraindication (If Yes, Select A Mask That Does Not Have Magnets)? No    11.29.23 Tlf - Patient Said She Is Experiencing Puffiness Around Her Lips When She Wakes Up In The Morning. Her Sleep Provider Advised Her To Try A Full Face Mask - Specifically The F20

## 2024-01-22 DIAGNOSIS — K21.9 GASTROESOPHAGEAL REFLUX DISEASE WITHOUT ESOPHAGITIS: ICD-10-CM

## 2024-01-22 DIAGNOSIS — F32.5 MAJOR DEPRESSION IN REMISSION (H): ICD-10-CM

## 2024-01-22 DIAGNOSIS — E78.2 MIXED HYPERLIPIDEMIA: ICD-10-CM

## 2024-01-22 RX ORDER — ATORVASTATIN CALCIUM 40 MG/1
40 TABLET, FILM COATED ORAL DAILY
COMMUNITY
Start: 2024-01-22

## 2024-01-22 NOTE — TELEPHONE ENCOUNTER
Nataly Kee is requesting a refill of:    Refused Prescriptions:                       Disp   Refills    atorvastatin (LIPITOR) 40 MG tablet [Pharm*                Sig: TAKE 1 TABLET DAILY  Refused By: PAIGE MONTENEGRO  Reason for Refusal: Patient needs appointment    FLUoxetine (PROZAC) 20 MG capsule [Pharmac*                Sig: TAKE 1 CAPSULE DAILY  Refused By: PAIGE MONTENEGRO  Reason for Refusal: Patient needs appointment    omeprazole (PRILOSEC) 20 MG DR capsule [Ph*                Sig: TAKE 1 CAPSULE DAILY  Refused By: PAIGE MONTENEGRO  Reason for Refusal: Patient needs appointment    Pt due for FASTING OV

## 2024-01-23 NOTE — PROGRESS NOTES
Assessment & Plan   Problem List Items Addressed This Visit          Digestive    Gastroesophageal reflux disease without esophagitis    Relevant Medications    omeprazole (PRILOSEC) 20 MG DR capsule    Class 2 severe obesity due to excess calories with serious comorbidity in adult (H)       Circulatory    Chronic atrial fibrillation (H)--followed by cardiology       Behavioral    Major depression in remission (H24)    Relevant Medications    FLUoxetine (PROZAC) 20 MG capsule     Other Visit Diagnoses       Encounter for screening mammogram for breast cancer    -  Primary    Relevant Orders    MA Screening Bilateral w/ Lexx    Radiology Referral    Mixed hyperlipidemia        Relevant Medications    atorvastatin (LIPITOR) 40 MG tablet    Other Relevant Orders    VENOUS COLLECTION (Completed)    Lipid Panel (BFP)    Comprehensive Metobolic Panel (BFP)    HEMOGRAM PLATELET DIFF (BFP) (Completed)    Initial Medicare annual wellness visit        Fall, initial encounter        Ecchymosis        Sebaceous cyst        Relevant Orders    Adult General Surg Referral           1. Mixed hyperlipidemia  Check labs, refilled medications.  - atorvastatin (LIPITOR) 40 MG tablet; Take 1 tablet (40 mg) by mouth daily  Dispense: 90 tablet; Refill: 1  - VENOUS COLLECTION  - Lipid Panel (BFP)  - Comprehensive Metobolic Panel (BFP)  - HEMOGRAM PLATELET DIFF (BFP)    2. Major depression in remission (H24)  This is controlling her symptoms, refilled.  - FLUoxetine (PROZAC) 20 MG capsule; TAKE 1 CAPSULE BY MOUTH EVERY DAY  Dispense: 90 capsule; Refill: 1    3. Gastroesophageal reflux disease without esophagitis  Controlled, refilled.  - omeprazole (PRILOSEC) 20 MG DR capsule; Take 1 capsule (20 mg) by mouth daily  Dispense: 90 capsule; Refill: 1    4. Encounter for screening mammogram for breast cancer    - MA Screening Bilateral w/ Lexx  - Radiology Referral    5. Initial Medicare annual wellness visit  Completed.    6. Class 2 severe  "obesity due to excess calories with serious comorbidity and body mass index (BMI) of 35.0 to 35.9 in adult (H)      7. Chronic atrial fibrillation (H)  Followed by cardiology.    8. Fall, initial encounter  Didn't hit her head.     9. Ecchymosis  Likely more dramatic bruising due to being on eliquis.    10. Sebaceous cyst  Referral done to general surgery. Not infected.  - Adult General Surg Referral; Future            BMI  Estimated body mass index is 35.64 kg/m  as calculated from the following:    Height as of this encounter: 1.619 m (5' 3.75\").    Weight as of this encounter: 93.4 kg (206 lb).         FUTURE APPOINTMENTS:       - Follow-up visit in 6 months. We discussed that patients with multiple medical problems and over age 65 should be seen every 6months.    No follow-ups on file.    Jodie Walsh MD  [facility]    Subjective     Nursing Notes:   Mary Carmen Duong CMA  1/24/2024 11:36 AM  Signed  Chief Complaint   Patient presents with    Recheck Medication     Fasting today, refill medications    Cyst     Cyst on back hat burst over the weekend     Pre-visit Screening:  Immunizations:  up to date  Colonoscopy:  is up to date  Mammogram: is due and ordered today  Asthma Action Test/Plan:  NA  PHQ9:  Done today  GAD7:  Done today  Questioned patient about current smoking habits Pt. has quit smoking.  Ok to leave detailed message on voice mail for today's visit only Yes, phone # [phone]       Nataly Kee is a 73 year old female who presents to clinic today for the following health issues   HPI     Medications--due for fasting labs and refills.  Cholesterol--is taking medications daily, is doing well.  GERD--symptoms are controlled on medications.  Prozac--controlling depression/anxiety. Due for refills.      Had the flu and slipped and fell when she got out of bed. When tried to get up she slipped again and hit herself on the bed. Was able to work out yesterday. Getting better. " "Nothing needs to be xrayed. Purple bruise on the left lower back area. This was on Friday. No head injury. Is on eliquis.    Also has a cyst on the back, had this lanced by Dr. Lemon years ago. Bleeding. Thinks she needs this removed.        Review of Systems   Constitutional, HEENT, cardiovascular, pulmonary, gi and gu systems are negative, except as otherwise noted.      Objective    /74 (BP Location: Right arm, Patient Position: Sitting, Cuff Size: Adult Large)   Pulse 65   Temp 97.8  F (36.6  C) (Temporal)   Ht 1.619 m (5' 3.75\")   Wt 93.4 kg (206 lb)   LMP 09/03/2001   SpO2 97%   BMI 35.64 kg/m    Body mass index is 35.64 kg/m .  Physical Exam   GENERAL: alert and no distress  RESP: lungs clear to auscultation - no rales, rhonchi or wheezes  CV: regular rate and rhythm, normal S1 S2, no S3 or S4, no murmur, click or rub, no peripheral edema  MS: no gross musculoskeletal defects noted, no edema  NEURO: Normal strength and tone, mentation intact and speech normal  PSYCH: mentation appears normal, affect normal/bright    Cyst on mid upper back near the spine, non tender, dried blood on the outside.  Results for orders placed or performed in visit on 01/24/24   HEMOGRAM PLATELET DIFF (BFP)     Status: None   Result Value Ref Range    WBC 4.6 4.0 - 11 10*9/L    RBC Count 4.86 3.8 - 5.2 10*12/L    Hemoglobin 14.8 11.7 - 15.7 g/dL    Hematocrit 45.6 35.0 - 47.0 %    MCV 93.9 78 - 100 fL    MCH 30.5 26 - 33 pg    MCHC 32.5 31 - 36 g/dL    Platelet Count 286 150 - 375 10^9/L    % Granulocytes 53.6 %    % Lymphocytes 35.3 %    % Monocytes 11.1 %         "

## 2024-01-24 ENCOUNTER — OFFICE VISIT (OUTPATIENT)
Dept: FAMILY MEDICINE | Facility: CLINIC | Age: 74
End: 2024-01-24

## 2024-01-24 VITALS
HEART RATE: 65 BPM | BODY MASS INDEX: 35.17 KG/M2 | OXYGEN SATURATION: 97 % | WEIGHT: 206 LBS | SYSTOLIC BLOOD PRESSURE: 114 MMHG | DIASTOLIC BLOOD PRESSURE: 74 MMHG | TEMPERATURE: 97.8 F | HEIGHT: 64 IN

## 2024-01-24 DIAGNOSIS — E66.01 CLASS 2 SEVERE OBESITY DUE TO EXCESS CALORIES WITH SERIOUS COMORBIDITY AND BODY MASS INDEX (BMI) OF 35.0 TO 35.9 IN ADULT (H): ICD-10-CM
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
## 2024-01-24 DIAGNOSIS — E66.812 CLASS 2 SEVERE OBESITY DUE TO EXCESS CALORIES WITH SERIOUS COMORBIDITY AND BODY MASS INDEX (BMI) OF 35.0 TO 35.9 IN ADULT (H): ICD-10-CM

## 2024-01-24 DIAGNOSIS — Z12.31 ENCOUNTER FOR SCREENING MAMMOGRAM FOR BREAST CANCER: Primary | ICD-10-CM

## 2024-01-24 DIAGNOSIS — R58 ECCHYMOSIS: ICD-10-CM

## 2024-01-24 DIAGNOSIS — Z00.00 INITIAL MEDICARE ANNUAL WELLNESS VISIT: ICD-10-CM

## 2024-01-24 DIAGNOSIS — E78.2 MIXED HYPERLIPIDEMIA: ICD-10-CM

## 2024-01-24 DIAGNOSIS — K21.9 GASTROESOPHAGEAL REFLUX DISEASE WITHOUT ESOPHAGITIS: ICD-10-CM

## 2024-01-24 DIAGNOSIS — L72.3 SEBACEOUS CYST: ICD-10-CM

## 2024-01-24 DIAGNOSIS — F32.5 MAJOR DEPRESSION IN REMISSION (H): ICD-10-CM

## 2024-01-24 DIAGNOSIS — I48.20 CHRONIC ATRIAL FIBRILLATION (H): ICD-10-CM

## 2024-01-24 LAB
% GRANULOCYTES: 53.6 %
ALBUMIN SERPL-MCNC: 3.8 G/DL (ref 3.6–5.1)
ALBUMIN/GLOB SERPL: 1.6 {RATIO} (ref 1–2.5)
ALP SERPL-CCNC: 85 U/L (ref 33–130)
ALT 1742-6: 24 U/L (ref 0–32)
AST 1920-8: 17 U/L (ref 0–35)
BILIRUB SERPL-MCNC: 1.3 MG/DL (ref 0.2–1.2)
BUN SERPL-MCNC: 13 MG/DL (ref 7–25)
BUN/CREATININE RATIO: 16.9 (ref 6–32)
CALCIUM SERPL-MCNC: 9.4 MG/DL (ref 8.6–10.3)
CHLORIDE SERPLBLD-SCNC: 104.5 MMOL/L (ref 98–110)
CHOLEST SERPL-MCNC: 146 MG/DL (ref 0–199)
CHOLEST/HDLC SERPL: 4 {RATIO} (ref 0–5)
CO2 SERPL-SCNC: 26.8 MMOL/L (ref 20–32)
CREAT SERPL-MCNC: 0.77 MG/DL (ref 0.6–1.3)
GLOBULIN, CALCULATED - QUEST: 2.4 (ref 1.9–3.7)
GLUCOSE SERPL-MCNC: 89 MG/DL (ref 60–99)
HCT VFR BLD AUTO: 45.6 % (ref 35–47)
HDLC SERPL-MCNC: 35 MG/DL (ref 40–150)
HEMOGLOBIN: 14.8 G/DL (ref 11.7–15.7)
LDLC SERPL CALC-MCNC: 89 MG/DL (ref 0–130)
LYMPHOCYTES NFR BLD AUTO: 35.3 %
MCH RBC QN AUTO: 30.5 PG (ref 26–33)
MCHC RBC AUTO-ENTMCNC: 32.5 G/DL (ref 31–36)
MCV RBC AUTO: 93.9 FL (ref 78–100)
MONOCYTES NFR BLD AUTO: 11.1 %
PLATELET COUNT - QUEST: 286 10^9/L (ref 150–375)
POTASSIUM SERPL-SCNC: 4.32 MMOL/L (ref 3.5–5.3)
PROT SERPL-MCNC: 6.2 G/DL (ref 6.1–8.1)
RBC # BLD AUTO: 4.86 10*12/L (ref 3.8–5.2)
SODIUM SERPL-SCNC: 136.4 MMOL/L (ref 135–146)
TRIGL SERPL-MCNC: 112 MG/DL (ref 0–149)
WBC # BLD AUTO: 4.6 10*9/L (ref 4–11)

## 2024-01-24 PROCEDURE — 80061 LIPID PANEL: CPT | Performed by: FAMILY MEDICINE

## 2024-01-24 PROCEDURE — G0438 PPPS, INITIAL VISIT: HCPCS | Performed by: FAMILY MEDICINE

## 2024-01-24 PROCEDURE — 80053 COMPREHEN METABOLIC PANEL: CPT | Performed by: FAMILY MEDICINE

## 2024-01-24 PROCEDURE — 85025 COMPLETE CBC W/AUTO DIFF WBC: CPT | Performed by: FAMILY MEDICINE

## 2024-01-24 PROCEDURE — 99214 OFFICE O/P EST MOD 30 MIN: CPT | Mod: 25 | Performed by: FAMILY MEDICINE

## 2024-01-24 PROCEDURE — 36415 COLL VENOUS BLD VENIPUNCTURE: CPT | Performed by: FAMILY MEDICINE

## 2024-01-24 RX ORDER — ATORVASTATIN CALCIUM 40 MG/1
40 TABLET, FILM COATED ORAL DAILY
Qty: 90 TABLET | Refills: 1 | Status: SHIPPED | OUTPATIENT
Start: 2024-01-24 | End: 2024-07-24

## 2024-01-24 ASSESSMENT — ANXIETY QUESTIONNAIRES
5. BEING SO RESTLESS THAT IT IS HARD TO SIT STILL: NOT AT ALL
GAD7 TOTAL SCORE: 0
2. NOT BEING ABLE TO STOP OR CONTROL WORRYING: NOT AT ALL
1. FEELING NERVOUS, ANXIOUS, OR ON EDGE: NOT AT ALL
IF YOU CHECKED OFF ANY PROBLEMS ON THIS QUESTIONNAIRE, HOW DIFFICULT HAVE THESE PROBLEMS MADE IT FOR YOU TO DO YOUR WORK, TAKE CARE OF THINGS AT HOME, OR GET ALONG WITH OTHER PEOPLE: NOT DIFFICULT AT ALL
3. WORRYING TOO MUCH ABOUT DIFFERENT THINGS: NOT AT ALL
7. FEELING AFRAID AS IF SOMETHING AWFUL MIGHT HAPPEN: NOT AT ALL
GAD7 TOTAL SCORE: 0
6. BECOMING EASILY ANNOYED OR IRRITABLE: NOT AT ALL

## 2024-01-24 ASSESSMENT — PATIENT HEALTH QUESTIONNAIRE - PHQ9
5. POOR APPETITE OR OVEREATING: NOT AT ALL
SUM OF ALL RESPONSES TO PHQ QUESTIONS 1-9: 1

## 2024-01-24 NOTE — PROGRESS NOTES
Nataly Kee is a 73 year old female who presents for Medicare Annual Wellness Visit.    Current providers caring for this patient include:  Patient Care Team:  Toby Melendez PA-C as PCP - General (Family Medicine)  Alphonso Martin MD as MD (Cardiovascular Disease)  Alphonso Martin MD as Assigned Heart and Vascular Provider  Jasmin Castañeda PA-C as Assigned Sleep Provider  Manuela Barone PA-C as Assigned PCP    Complete Medical and Social history reviewed with patient, outlined below.    Patient Active Problem List   Diagnosis    History of pulmonary embolism    Pure hypercholesterolemia    Major depression in remission (H24)    ACP (advance care planning)    Vitamin D insufficiency    Health Care Home    History of colonic polyps - repeat colonoscopy 8/2025    History of basal cell carcinoma    Gastroesophageal reflux disease without esophagitis    Pulmonary embolism (H)    Subclinical hypothyroidism    Abnormal stress test    Hyperlipidemia LDL goal <100    Osteopenia- repeat 2024       Past Medical History:   Diagnosis Date    Depression     Depressive disorder     GERD (gastroesophageal reflux disease)        Past Surgical History:   Procedure Laterality Date    COLONOSCOPY  04/01/2015    tubular adenoma    HC REMOVAL OF TONSILS,12+ Y/O      age 20    IR PULMONARY ANGIOGRAM BILATERAL  2/17/2022    ZZC LIGATE FALLOPIAN TUBE  01/01/1983    ZZC VAGINAL HYSTERECTOMY  01/01/2001    Hysterectomy, Vaginal - fibroids    ZZ COLONOSCOPY THRU STOMA, DIAGNOSTIC  08/01/2010    3 mm polyp in descending colon/ Dr Asecnio       Family History   Problem Relation Age of Onset    Coronary Artery Disease Mother     Heart Disease Mother         related to chemotherapy treatment; pacemaker    Cancer Mother         hodgekins disease    Cerebrovascular Disease Mother     Depression Mother     Coronary Artery Disease Father     Respiratory Father         emphysema    Heart Disease Father         abdominal  "aneurysm    Multiple Sclerosis Sister         relapsing praveen.     Hypertension Sister     Other - See Comments Sister         feet issues    Bipolar Disorder Daughter     Colon Cancer No family hx of        Social History     Tobacco Use    Smoking status: Former     Types: Cigarettes     Quit date: 1975     Years since quittin.0     Passive exposure: Past    Smokeless tobacco: Never   Substance Use Topics    Alcohol use: Not Currently       Diet: regular, low salt/low fat  Physical Activity: patient exercises 5 times weekly  Depression Screen:    Over the past 2 weeks, patient has felt down, depressed, or hopeless:  No    Over the past 2 weeks, patient has felt little interest or pleasure in doing things: No    Functional ability/Safety screen:  Up and go test (able to get up and walk longer than 30 seconds): Passed  Patient needs assistance with: nothing  Patient's home has the following possible safety concerns: none identified  Patient has concerns about her hearing:  No  Cognitive Screen  Patient repeats three objects (ball, flag, tree)      Clock drawing test:   NORMAL  Recalls three objects after 3 minutes (ball,flag,tree):    recalls 2 objects (2 points)    Physical Exam:  /74 (BP Location: Right arm, Patient Position: Sitting, Cuff Size: Adult Large)   Pulse 65   Temp 97.8  F (36.6  C) (Temporal)   Ht 1.619 m (5' 3.75\")   Wt 93.4 kg (206 lb)   LMP 2001   SpO2 97%   BMI 35.64 kg/m     Body mass index is 35.64 kg/m .        Health Maintenance   Topic Date Due    HF ACTION PLAN  Never done    MEDICARE ANNUAL WELLNESS VISIT  2022    ALT  2023    CBC  2023    PHQ-9  2023    BMP  2023    MAMMO SCREENING  2023    LIPID  2024    FALL RISK ASSESSMENT  2025    COLORECTAL CANCER SCREENING  2025    ADVANCE CARE PLANNING  2026    DTAP/TDAP/TD IMMUNIZATION (3 - Td or Tdap) 2028    DEXA  2036    TSH W/FREE T4 REFLEX  " Completed    HEPATITIS C SCREENING  Completed    DEPRESSION ACTION PLAN  Completed    INFLUENZA VACCINE  Completed    Pneumococcal Vaccine: 65+ Years  Completed    ZOSTER IMMUNIZATION  Completed    RSV VACCINE (Pregnancy & 60+)  Completed    COVID-19 Vaccine  Completed    IPV IMMUNIZATION  Aged Out    HPV IMMUNIZATION  Aged Out    MENINGITIS IMMUNIZATION  Aged Out    RSV MONOCLONAL ANTIBODY  Aged Out           End of Life Planning:   Patient currently has an advanced directive: Yes.  Practitioner is supportive of decision.    Education/Counseling:   Based on review of the above information, the following items were addressed:      Discussed healthy diet and regular exercise    Appropriate preventive services were discussed with this patient, including applicable screening as appropriate for cardiovascular disease, diabetes, osteopenia/osteoporosis, and glaucoma.  As appropriate for age/gender, discussed screening for colorectal cancer, prostate cancer, breast cancer, and cervical cancer.   Checklist reviewing preventive services available has been given to the patient.        Pre-visit Screening:  Immunizations:  up to date  Colonoscopy:  is up to date  Mammogram: is due and ordered today  Asthma Action Test/Plan:  NA  PHQ9:  Done today  GAD7:  Done today  Questioned patient about current smoking habits Pt. has quit smoking.  Ok to leave detailed message on voice mail for today's visit only Yes, phone # [phone]

## 2024-01-24 NOTE — PATIENT INSTRUCTIONS
Health Maintenance   Topic Date Due    HF ACTION PLAN  Never done    MEDICARE ANNUAL WELLNESS VISIT  02/19/2022    ALT  02/19/2023    CBC  03/30/2023    PHQ-9  07/30/2023    BMP  07/31/2023    MAMMO SCREENING  12/21/2023    LIPID  01/31/2024    FALL RISK ASSESSMENT  01/24/2025    COLORECTAL CANCER SCREENING  08/04/2025    ADVANCE CARE PLANNING  02/21/2026    DTAP/TDAP/TD IMMUNIZATION (3 - Td or Tdap) 04/24/2028    DEXA  12/21/2036    TSH W/FREE T4 REFLEX  Completed    HEPATITIS C SCREENING  Completed    DEPRESSION ACTION PLAN  Completed    INFLUENZA VACCINE  Completed    Pneumococcal Vaccine: 65+ Years  Completed    ZOSTER IMMUNIZATION  Completed    RSV VACCINE (Pregnancy & 60+)  Completed    COVID-19 Vaccine  Completed    IPV IMMUNIZATION  Aged Out    HPV IMMUNIZATION  Aged Out    MENINGITIS IMMUNIZATION  Aged Out    RSV MONOCLONAL ANTIBODY  Aged Out

## 2024-01-25 ENCOUNTER — OFFICE VISIT (OUTPATIENT)
Dept: SURGERY | Facility: CLINIC | Age: 74
End: 2024-01-25
Payer: COMMERCIAL

## 2024-01-25 VITALS
SYSTOLIC BLOOD PRESSURE: 116 MMHG | BODY MASS INDEX: 35.17 KG/M2 | RESPIRATION RATE: 16 BRPM | DIASTOLIC BLOOD PRESSURE: 82 MMHG | HEART RATE: 67 BPM | HEIGHT: 64 IN | OXYGEN SATURATION: 97 % | WEIGHT: 206 LBS

## 2024-01-25 DIAGNOSIS — L72.3 SEBACEOUS CYST: Primary | ICD-10-CM

## 2024-01-25 LAB — MAMMOGRAM: NORMAL

## 2024-01-25 PROCEDURE — 99203 OFFICE O/P NEW LOW 30 MIN: CPT | Performed by: SURGERY

## 2024-01-25 RX ORDER — SULFAMETHOXAZOLE/TRIMETHOPRIM 800-160 MG
1 TABLET ORAL 2 TIMES DAILY
Qty: 14 TABLET | Refills: 0 | Status: SHIPPED | OUTPATIENT
Start: 2024-01-25 | End: 2024-02-28

## 2024-01-25 NOTE — LETTER
January 25, 2024      RE:   Nataly Kee 1950      Dear Colleague,    Thank you for referring your patient, Nataly Kee, to [facility]. Please see a copy of my visit note below.    This is a 73-year-old patient with a couple of longstanding lumps on her back.  The more superior lesion began draining over the weekend.  She was seen at her primary clinic yesterday, and found to have a sebaceous cyst.  There was no obvious infection, though an area adjacent to the cyst had drained.  The patient denies any significant pain.  She is not sure what came out of the cyst when it drained.    Physical exam:  The patient is in no apparent distress.  Breathing is nonlabored.  The back reveals a 3 cm cyst in the upper midline.  There is a bit of granulation tissue inferiorly here.  I probed this, but was not able to get any significant material out.  There is a bit of thin cloudy fluid draining out of the punctum of the cyst.  There is a 4 cm cyst in the low midline back which shows no evidence of inflammation    Assessment and plan: This is a patient with a cyst which has been recently inflamed and now seems to have a bit of cloudy fluid coming out of the punctum.  I will start the patient on a course of Bactrim given the possibility of low-grade infection here.  Optimally, we would get the open area to heal and inflammation at the site to calm down prior to excision.  I am hopeful that we can get this to calm down sufficiently that we can remove both of her cysts in 6 to 8 weeks.  If she does have worsening of her symptoms, she should return sooner or, if unable to get into see us, go to the emergency room or urgent care.  I would like to see the patient back in about 1 week.  The patient is comfortable with this plan.    Again, thank you for allowing me to participate in the care of your patient.      Sincerely,      Ashwin Saavedra MD

## 2024-01-25 NOTE — PROGRESS NOTES
This is a 73-year-old patient with a couple of longstanding lumps on her back.  The more superior lesion began draining over the weekend.  She was seen at her primary clinic yesterday, and found to have a sebaceous cyst.  There was no obvious infection, though an area adjacent to the cyst had drained.  The patient denies any significant pain.  She is not sure what came out of the cyst when it drained.    Past Medical History:   Diagnosis Date    Depression     Depressive disorder     GERD (gastroesophageal reflux disease)      Past Surgical History:   Procedure Laterality Date    COLONOSCOPY  04/01/2015    tubular adenoma    HC REMOVAL OF TONSILS,12+ Y/O      age 20    IR PULMONARY ANGIOGRAM BILATERAL  2/17/2022    Z LIGATE FALLOPIAN TUBE  01/01/1983    ZC VAGINAL HYSTERECTOMY  01/01/2001    Hysterectomy, Vaginal - fibroids    [facility]    3 mm polyp in descending colon/ Dr Asencio     Physical exam:  The patient is in no apparent distress.  Breathing is nonlabored.  The back reveals a 3 cm cyst in the upper midline.  There is a bit of granulation tissue inferiorly here.  I probed this, but was not able to get any significant material out.  There is a bit of thin cloudy fluid draining out of the punctum of the cyst.  There is a 4 cm cyst in the low midline back which shows no evidence of inflammation    Assessment and plan: This is a patient with a cyst which has been recently inflamed and now seems to have a bit of cloudy fluid coming out of the punctum.  I will start the patient on a course of Bactrim given the possibility of low-grade infection here.  Optimally, we would get the open area to heal and inflammation at the site to calm down prior to excision.  I am hopeful that we can get this to calm down sufficiently that we can remove both of her cysts in 6 to 8 weeks.  If she does have worsening of her symptoms, she should return sooner or, if unable to get into see us,  go to the emergency room or urgent care.  I would like to see the patient back in about 1 week.  The patient is comfortable with this plan.    Ashiwn Saavedra MD  [facility]    Please route or send letter to:  Primary Care Provider (PCP) and Referring Provider

## 2024-01-31 ENCOUNTER — PATIENT OUTREACH (OUTPATIENT)
Dept: GASTROENTEROLOGY | Facility: CLINIC | Age: 74
End: 2024-01-31
Payer: COMMERCIAL

## 2024-02-01 ENCOUNTER — OFFICE VISIT (OUTPATIENT)
Dept: SURGERY | Facility: CLINIC | Age: 74
End: 2024-02-01
Payer: COMMERCIAL

## 2024-02-01 ENCOUNTER — TELEPHONE (OUTPATIENT)
Dept: SURGERY | Facility: CLINIC | Age: 74
End: 2024-02-01

## 2024-02-01 VITALS
HEART RATE: 63 BPM | DIASTOLIC BLOOD PRESSURE: 62 MMHG | HEIGHT: 64 IN | WEIGHT: 206 LBS | SYSTOLIC BLOOD PRESSURE: 114 MMHG | OXYGEN SATURATION: 93 % | RESPIRATION RATE: 16 BRPM | BODY MASS INDEX: 35.17 KG/M2

## 2024-02-01 DIAGNOSIS — L72.3 SEBACEOUS CYST: Primary | ICD-10-CM

## 2024-02-01 PROCEDURE — 99214 OFFICE O/P EST MOD 30 MIN: CPT | Performed by: SURGERY

## 2024-02-01 NOTE — LETTER
February 1, 2024      RE:   Nataly Kee 1950      Dear Colleague,    Thank you for referring your patient, Nataly Kee, to [facility] at [facility]. Please see a copy of my visit note below.    The patient returns today to follow-up regarding her back cyst.  She feels much better than she did at her last visit, 1 week ago.  She has not noticed any additional drainage.    Past medical and surgical histories are reviewed.    The upper back reveals the previously noted cyst.  The open area has now essentially healed, with only a small eschar.  There is no obvious inflammation or drainage from the cyst itself.  The lower back cysts showed no evidence of inflammation.  It does appear that there are 2 adjacent cysts with 2 separate puncti.    Assessment and plan: Overall, the patient is doing well.  She is interested in having her cyst removed.  I recommended that we go ahead and do this in about 6 to 8 weeks, as that we will give time for the inflammation to calm down.  I recommended doing this in the operating room under a general anesthetic, given the multiple areas will need to make incisions.  Conceivably this could be done under local anesthesia with sedation, though that may be more challenging with the position involved.  I would anticipate a prone positioning.  We discussed the procedure, along with its risks and complications, in detail today.  The patient has agreed to proceed.  We will work on getting surgery scheduled for her.  I would like her to stop her Eliquis 48 hours prior to surgery.    A total of 25 minutes was spent today in chart review, patient examination and discussion, and documentation.    Again, thank you for allowing me to participate in the care of your patient.      Sincerely,      MD JED Camarena/zurdo  D: 2/1/2024  T: 1:29pm

## 2024-02-01 NOTE — TELEPHONE ENCOUNTER
Type of surgery: EXCISION OF MULTIPLE BACK CYSTS  Location of surgery: [facility]  Date and time of surgery: 3-18-24, 7:30 AM  Surgeon: DR ULLOA   Pre-Op Appt Date: PATIENT TO SCHEDULE   Post-Op Appt Date: NA   Packet sent out:  GIVEN TO PATIENT   Pre-cert/Authorization completed:  Not Applicable  Date: 2-1-24        EXCISION OF MULTIPLE BACK CYSTS GENERAL PT INST TO HAVE H&P 60 MINS REQ PA ASSIST [facility] ALW

## 2024-02-01 NOTE — LETTER
2024       Nataly Kee  [address]  [facility] [address]     RE: [id number]  : 1950    Nataly Kee has been scheduled for surgery on 3-18-24 at 7:30 AM at [facility] with Dr Ashwin Saavedra.  The hospital is located at [address] in [place].    Please check in at the Surgery reception desk at 5:30 AM. This is located in the back of the hospital on the East side, just past the Emergency Room entrance.     DO NOT EAT OR DRINK ANYTHING 8 HOURS BEFORE YOUR ARRIVAL TIME.   You may have sips of clear liquids up until 2 hours before your arrival time. If you have been advised to take your medication, please do this early in the morning with just sips of clear liquid.        If you are on a blood thinner such as ELIQUIS, please stop taking this 48 hours before your surgery.         Hospital regulations require an updated pre-operative examination to be completed within 30 days of the procedure. This can be done by your primary care provider. Please ask them to fax documentation to [phone]. We also recommend you bring a copy with you.     You should shower before your surgery with Hibiclens or Exidine soap.  This can be found at your local pharmacy or you can pick it up from our office for free.  Please call our office if you have any questions.     You will be required to have an Adult (friend or family member) drive you home after your surgery and arrange for an adult to stay with you until the next morning.     You will receive several calls from our staff 3-7 days prior to your scheduled procedure with further details and to answer any questions you may have.    It is sometimes necessary to adjust the surgery schedule due to emergencies and additions to the schedule.  If your surgery is affected by this, we greatly appreciate your flexibility and understanding in this matter    It is best if you call regarding post-operative questions between the hours of  8:00 am & 3:00 pm Monday-Friday, so you have access to the daytime care team that know you best.  Prescription refills are accepted during regular office hours only.    Please do not bring any Disability or FMLA papers to the hospital.  They need to be either faxed ([phone]), mailed or hand delivered to our office by you or a family member for completion.  Please allow 14 business days to complete paperwork.        If you have questions or concerns, please contact our office at [phone].

## 2024-02-01 NOTE — LETTER
Showering Before Surgery      Your surgeon has asked you to take 2 showers before surgery.    Why is this important?  It is normal for bacteria (germs) to be on your skin. The skin protects us from these germs. When you have surgery, we cut the skin. Sometimes germs get into the cuts and cause infection (illness caused by germs). By following the instructions below and using special soap, you will lower the number of germs on your skin. This decreases your chance of infection.  Special soap  Buy or get 8 ounces of antiseptic surgical soap called 4% CHG. Common name brands of this soap are Hibiclens and Exidine.  You can find it at your local pharmacy, clinic or retail store. If you have trouble, ask your pharmacist to help you find the right substitute.  A note about shaving:  Do not shave within 12 inches of your incision (surgical cut) area for at least 3 days before surgery. Shaving can make small cuts in the skin. This puts you at a higher risk of infection.  Items you will need for each shower:  1 newly washed towel  4 ounces of one of the above soaps  Clean pajamas or clothes to change into    Follow these instructions:  Follow these steps the evening before surgery and the morning of surgery.  Wash your hair and body with your regular shampoo and soap. Make sure you rinse the shampoo and soap from your hair and body.  Using clean hands, apply about 2 ounces of soap gently on your skin from your ear lobes to your toes. Use on your groin area last. Do not use this soap on your face or head. If you get any soap in your eyes, ears or mouth, rinse right away.  Repeat step 2. It is very important to let the soap stay on your skin for at least 1 minute.    Rinse well and dry off using a clean towel.    If you feel any tingling, itching or other irritation, rinse right away. It is normal to feel some coolness on the skin after using the antiseptic soap. Your skin may feel a bit dry after the shower, but do not use  any lotions, creams or moisturizers. Do not use hair spray or other products in your hair.  5.  Dress in freshly washed clothes or pajamas. Use fresh pillowcases and sheets on your bed.    Repeat these steps the morning of surgery.  If you have any questions about showering or an allergy to CHG soap, please call your surgery center.    For informational purposes only. Not to replace the advice of your health care provider. Copyright   2012 [facility]. All rights reserved. Clinically reviewed by [employer]. [employer] [id number] - REV 12

## 2024-02-01 NOTE — PROGRESS NOTES
The patient returns today to follow-up regarding her back cyst.  She feels much better than she did at her last visit, 1 week ago.  She has not noticed any additional drainage.    Past medical and surgical histories are reviewed.    The upper back reveals the previously noted cyst.  The open area has now essentially healed, with only a small eschar.  There is no obvious inflammation or drainage from the cyst itself.  The lower back cysts showed no evidence of inflammation.  It does appear that there are 2 adjacent cysts with 2 separate puncti.    Assessment and plan: Overall, the patient is doing well.  She is interested in having her cyst removed.  I recommended that we go ahead and do this in about 6 to 8 weeks, as that we will give time for the inflammation to calm down.  I recommended doing this in the operating room under a general anesthetic, given the multiple areas will need to make incisions.  Conceivably this could be done under local anesthesia with sedation, though that may be more challenging with the position involved.  I would anticipate a prone positioning.  We discussed the procedure, along with its risks and complications, in detail today.  The patient has agreed to proceed.  We will work on getting surgery scheduled for her.  I would like her to stop her Eliquis 48 hours prior to surgery.    A total of 25 minutes was spent today in chart review, patient examination and discussion, and documentation.    Ashwin Saavedra MD  [facility]    Please route or send letter to:  Primary Care Provider (PCP)

## 2024-02-28 ENCOUNTER — OFFICE VISIT (OUTPATIENT)
Dept: FAMILY MEDICINE | Facility: CLINIC | Age: 74
End: 2024-02-28

## 2024-02-28 VITALS
HEART RATE: 70 BPM | TEMPERATURE: 97.1 F | BODY MASS INDEX: 35.34 KG/M2 | WEIGHT: 207 LBS | OXYGEN SATURATION: 97 % | DIASTOLIC BLOOD PRESSURE: 70 MMHG | HEIGHT: 64 IN | SYSTOLIC BLOOD PRESSURE: 114 MMHG

## 2024-02-28 DIAGNOSIS — F32.5 MAJOR DEPRESSION IN REMISSION (H): ICD-10-CM

## 2024-02-28 DIAGNOSIS — Z86.711 HISTORY OF PULMONARY EMBOLISM: ICD-10-CM

## 2024-02-28 DIAGNOSIS — I25.10 NON-OCCLUSIVE CORONARY ARTERY DISEASE: ICD-10-CM

## 2024-02-28 DIAGNOSIS — L72.3 SEBACEOUS CYST: ICD-10-CM

## 2024-02-28 DIAGNOSIS — Z01.818 PRE-OPERATIVE EXAMINATION: Primary | ICD-10-CM

## 2024-02-28 DIAGNOSIS — E78.2 MIXED HYPERLIPIDEMIA: ICD-10-CM

## 2024-02-28 DIAGNOSIS — I47.19 ATRIAL TACHYCARDIA, PAROXYSMAL (H): ICD-10-CM

## 2024-02-28 DIAGNOSIS — R76.0 ANTICARDIOLIPIN ANTIBODY POSITIVE: ICD-10-CM

## 2024-02-28 PROCEDURE — 99214 OFFICE O/P EST MOD 30 MIN: CPT | Performed by: PHYSICIAN ASSISTANT

## 2024-02-28 NOTE — PROGRESS NOTES
PREOPERATIVE EXAMINATION  [facility]  [address]  [address]  [facility] [address]  Phone: [phone]  Fax: [phone]  Primary Provider: Manuela Pruitt  Pre-op Performing Provider: MANUELA PRUITT    Feb 28, 2024     Nataly is a 73 year old, presenting for the following:    Pre-Op Exam (Excision of multiple back cysts/[facility]/Dr. Ashwin Saavedra/3/18/24/7:30 am)    Surgical Information  Surgery/Procedure: Excision of multiple back cysts   Surgery Location: [facility]  Surgeon: Dr. Ashwin Saavedra  Surgery Date: 3/18/24  Time of Surgery: 7:30 am  Where patient plans to recover: At home with family  Fax number for surgical facility: Note does not need to be faxed, will be available electronically in Epic.    Assessment & Plan     The proposed surgical procedure is considered LOW risk.    Pre-operative examination  Sebaceous cyst    Mixed hyperlipidemia    Major depression in remission (H24)    Atrial tachycardia, paroxysmal    Non-occlusive coronary artery disease    History of pulmonary embolism    Anticardiolipin antibody positive- mildly positive- Dr. Martin- [facility]     - No identified additional risk factors other than previously addressed    Antiplatelet or Anticoagulation Medication Instructions   - apixaban (Eliquis), edoxaban (Savaysa), rivaroxaban (Xarelto): Bleeding risk is moderate or high for this procedure AND CrCl  (>=) 50 mL/min. HOLD 2 days before surgery.     Additional Medication Instructions  Patient is to take all scheduled medications on the day of surgery    Recommendation  APPROVAL GIVEN to proceed with proposed procedure, without further diagnostic evaluation.      35 minutes spent by me on the date of the encounter doing chart review, review of outside records, review of test results, patient visit, and documentation     Subjective     HPI related to upcoming procedure:   Nataly has several sebaceous cysts in  her back. Plan is for surgical excision of her cysts. Some of them are close to spine, so may require general anesthesia.    1. No - Have you ever had a heart attack or stroke?  2. No - Have you ever had surgery on your heart or blood vessels, such as a stent, coronary (heart) bypass, or surgery on an artery in the head, neck, heart, or legs?  3. No - Do you have chest pain when you are physically active?  4. No - Do you have a history of heart failure?  5. No - Do you currently have a cold, bronchitis, or symptoms of other respiratory (head and chest) infections?  6. No - Do you have a cough, shortness of breath, or wheezing?  7. YES - Do you or anyone in your family have a history of blood clots? Pt herself had PE after hysterectomy 2001 as well as PE 2/2024 surrounding covid-19 infection. Saw hematology for coagulopathy work-up starting 3/2023 and recommended lifelong anticoagulation. Stable on Eliquis.  8. No - Do you or anyone in your family have a serious bleeding problem, such as long-lasting bleeding after surgeries or cuts?  9. No - Have you ever had anemia or been told to take iron pills?  10. No - Have you had any abnormal blood loss such as black, tarry or bloody stools, or abnormal vaginal bleeding?  11. Yes - Have you ever had a blood transfusion? 2 units following hysterectomy.   12. Yes - Are you willing to have a blood transfusion if it is medically needed before, during, or after your surgery?  13. No - Have you or anyone in your family ever had problems with anesthesia (sedation for surgery)?  14. Yes - Do you have sleep apnea, excessive snoring, or daytime drowsiness? Uses CPAP  15. No - Do you have any artifical heart valves or other implanted medical devices, such as a pacemaker, defibrillator, or continuous glucose monitor?  16. No - Do you have any artifical joints?  17. No - Are you allergic to latex?  18. No - Is there any chance that you may be pregnant?    Health Care Directive  Patient  has a Health Care Directive on file    Preoperative Review of    reviewed - no record of controlled substances prescribed.    Status of Chronic Conditions:  See problem list for active medical problems.  Problems all longstanding and stable, except as noted/documented.  See ROS for pertinent symptoms related to these conditions.    Patient Active Problem List    Diagnosis Date Noted    Anticardiolipin antibody positive- mildly positive- Dr. Martin- [facility] 03/01/2024     Priority: Medium    Non-occlusive coronary artery disease 03/01/2024     Priority: Medium    Class 2 severe obesity due to excess calories with serious comorbidity in adult (H) 01/24/2024     Priority: Medium    Osteopenia- repeat 2024 10/10/2023     Priority: Medium    Abnormal stress test 05/19/2023     Priority: Medium    Mixed hyperlipidemia 05/19/2023     Priority: Medium    Subclinical hypothyroidism 03/02/2022     Priority: Medium    Pulmonary embolism (H) 02/17/2022     Priority: Medium    Gastroesophageal reflux disease without esophagitis 02/14/2022     Priority: Medium    History of basal cell carcinoma 02/19/2021     Priority: Medium    History of colonic polyps - repeat colonoscopy 8/2025 08/05/2020     Priority: Medium    Vitamin D insufficiency 01/14/2012     Priority: Medium    ACP (advance care planning) 01/09/2012     Priority: Medium     Advance Care Planning 9/29/2015: Receipt of ACP document:  Received: Health Care Directive which was witnessed or notarized on 8/9/2012.  Document previously scanned on 8/9/2012.  Validation form completed and sent to be scanned.  Code Status reflects choices in most recent ACP document.  Confirmed/documented designated decision maker(s).  Added by Malka Garg        Advance Care Planning:   Receipt of ACP document:  Received: Health Care Directive which was witnessed or notarized on 8/9/12.  Document previously scanned on 6/18/14.  Validation form completed and sent to be scanned.   Code Status needs to be updated to reflect choices in most recent ACP document. Orders placed.  Confirmed/documented designated decision maker(s). See permanent comments section of demographics in clinical tab. View document(s) and details by clicking on code status.   Added by Yaa Rodriguez on 7/10/2014.          Major depression in remission (H24) 04/22/2010     Priority: Medium    Pure hypercholesterolemia 03/08/2003     Priority: Medium    History of pulmonary embolism 10/13/2001     Priority: Medium    Health Care Home 08/05/2013     Priority: Low     State Tier Level:  Tier 1  Status:  n/a  Care Coordinator:  n/a    See Letters for [facility] Care Plan            Past Medical History:   Diagnosis Date    Depression     Depressive disorder     GERD (gastroesophageal reflux disease)      Past Surgical History:   Procedure Laterality Date    COLONOSCOPY  04/01/2015    tubular adenoma    HC REMOVAL OF TONSILS,12+ Y/O      age 20    IR PULMONARY ANGIOGRAM BILATERAL  2/17/2022    Z LIGATE FALLOPIAN TUBE  01/01/1983    Z VAGINAL HYSTERECTOMY  01/01/2001    Hysterectomy, Vaginal - fibroids    [facility]  [facility]    3 mm polyp in descending colon/ Dr Asencio     Current Outpatient Medications   Medication Sig Dispense Refill    apixaban ANTICOAGULANT (ELIQUIS) 5 MG tablet Take 1 tablet (5 mg) by mouth 2 times daily 180 tablet 3    atorvastatin (LIPITOR) 40 MG tablet Take 1 tablet (40 mg) by mouth daily 90 tablet 1    Cholecalciferol (VITAMIN D) 125 MCG (5000 UT) capsule Take 1 capsule by mouth daily 2000 international unit(s)      ferrous sulfate (SLO-FE) 142 (45 Fe) MG CR tablet Take 142 mg by mouth daily      FLUoxetine (PROZAC) 20 MG capsule TAKE 1 CAPSULE BY MOUTH EVERY DAY 90 capsule 1    metoprolol succinate ER (TOPROL XL) 25 MG 24 hr tablet Take 1 tablet (25 mg) by mouth 2 times daily 180 tablet 3    omeprazole (PRILOSEC) 20 MG DR capsule Take 1 capsule (20 mg) by mouth daily 90  "capsule 1    valACYclovir (VALTREX) 1000 mg tablet Take 2,000 mg by mouth 2 times daily         Allergies   Allergen Reactions    No Known Drug Allergy         Social History     Tobacco Use    Smoking status: Former     Types: Cigarettes     Quit date: 1975     Years since quittin.2     Passive exposure: Past    Smokeless tobacco: Never   Substance Use Topics    Alcohol use: Not Currently     Family History   Problem Relation Age of Onset    Coronary Artery Disease Mother     Heart Disease Mother         related to chemotherapy treatment; pacemaker    Cancer Mother         hodgekins disease    Cerebrovascular Disease Mother     Depression Mother     Coronary Artery Disease Father     Respiratory Father         emphysema    Heart Disease Father         abdominal aneurysm    Multiple Sclerosis Sister         relapsing praveen.     Hypertension Sister     Other - See Comments Sister         feet issues    Bipolar Disorder Daughter     Colon Cancer No family hx of      History   Drug Use No         Review of Systems    Review of Systems  Constitutional, neuro, ENT, endocrine, pulmonary, cardiac, gastrointestinal, genitourinary, musculoskeletal, integument and psychiatric systems are negative, except as otherwise noted.    Objective    /70 (BP Location: Right arm, Patient Position: Sitting, Cuff Size: Adult Large)   Pulse 70   Temp 97.1  F (36.2  C) (Temporal)   Ht 1.613 m (5' 3.5\")   Wt 93.9 kg (207 lb)   LMP 2001   SpO2 97%   BMI 36.09 kg/m     Estimated body mass index is 36.09 kg/m  as calculated from the following:    Height as of this encounter: 1.613 m (5' 3.5\").    Weight as of this encounter: 93.9 kg (207 lb).  Physical Exam  GENERAL: alert and no distress  EYES: Eyes grossly normal to inspection, PERRL and conjunctivae and sclerae normal  HENT: ear canals and TM's normal, nose and mouth without ulcers or lesions  NECK: no adenopathy, no asymmetry, masses, or scars  RESP: lungs clear to " auscultation - no rales, rhonchi or wheezes  CV: regular rate and rhythm, normal S1 S2, no S3 or S4, no murmur, click or rub, no peripheral edema  ABDOMEN: soft, nontender, no hepatosplenomegaly, no masses and bowel sounds normal  MS: no gross musculoskeletal defects noted, no edema  SKIN: no suspicious lesions or rashes, other than known sebaceous cysts.   NEURO: Normal strength and tone, mentation intact and speech normal  PSYCH: mentation appears normal, affect normal/bright    Diagnostics  Office Visit on 01/24/2024   Component Date Value Ref Range Status    Cholesterol 01/24/2024 146  0 - 199 mg/dL Final    Triglycerides 01/24/2024 112  0 - 149 mg/dL Final    HDL Cholesterol 01/24/2024 35 (A)  40 - 150 mg/dL Final    LDL Cholesterol Direct 01/24/2024 89  0 - 130 mg/dL Final    Cholesterol/HDL Ratio 01/24/2024 4  0 - 5 Final    Carbon Dioxide 01/24/2024 26.8  20 - 32 mmol/L Final    Creatinine 01/24/2024 0.77  0.60 - 1.30 mg/dL Final    Glucose 01/24/2024 89  60 - 99 mg/dL Final    Sodium 01/24/2024 136.4  135 - 146 mmol/L Final    Potassium 01/24/2024 4.32  3.5 - 5.3 mmol/L Final    Chloride 01/24/2024 104.5  98 - 110 mmol/L Final    Protein Total 01/24/2024 6.2  6.1 - 8.1 g/dL Final    Albumin 01/24/2024 3.8  3.6 - 5.1 g/dL Final    Alkaline Phosphatase 01/24/2024 85  33 - 130 U/L Final    ALT 01/24/2024 24  0 - 32 U/L Final    AST 01/24/2024 17  0 - 35 U/L Final    Bilirubin Total 01/24/2024 1.3 (A)  0.2 - 1.2 mg/dL Final    Urea Nitrogen 01/24/2024 13  7 - 25 mg/dL Final    Calcium 01/24/2024 9.4  8.6 - 10.3 mg/dL Final    BUN/Creatinine Ratio 01/24/2024 16.9  6 - 32 Final    Globulin Calculated 01/24/2024 2.4  1.9 - 3.7 Final    A/G Ratio 01/24/2024 1.6  1 - 2.5 Final    WBC 01/24/2024 4.6  4.0 - 11 10*9/L Final    RBC Count 01/24/2024 4.86  3.8 - 5.2 10*12/L Final    Hemoglobin 01/24/2024 14.8  11.7 - 15.7 g/dL Final    Hematocrit 01/24/2024 45.6  35.0 - 47.0 % Final    MCV 01/24/2024 93.9  78 - 100 fL  Final    MCH 01/24/2024 30.5  26 - 33 pg Final    MCHC 01/24/2024 32.5  31 - 36 g/dL Final    Platelet Count 01/24/2024 286  150 - 375 10^9/L Final    % Granulocytes 01/24/2024 53.6  % Final    % Lymphocytes 01/24/2024 35.3  % Final    % Monocytes 01/24/2024 11.1  % Final       EKG: sinus bradycardia, normal axis, normal intervals, no acute ST/T changes c/w ischemia, no LVH by voltage criteria, unchanged from previous tracings (From 9/2023- in EPIC)    Revised Cardiac Risk Index (RCRI)  The patient has the following serious cardiovascular risks for perioperative complications:   - No serious cardiac risks = 0 points     RCRI Interpretation: 1 point: Class II (low risk - 0.9% complication rate)     Signed Electronically by: Manuela Barone PA-C  Copy of this evaluation report is provided to requesting physician.

## 2024-02-28 NOTE — NURSING NOTE
Chief Complaint   Patient presents with    Pre-Op Exam     Excision of multiple back cysts  [facility]  Dr. Ashwin Saavedra  3/18/24  7:30 am

## 2024-03-01 PROBLEM — E78.2 MIXED HYPERLIPIDEMIA: Status: ACTIVE | Noted: 2023-05-19

## 2024-03-01 PROBLEM — R76.0 ANTICARDIOLIPIN ANTIBODY POSITIVE: Status: ACTIVE | Noted: 2024-03-01

## 2024-03-01 PROBLEM — I25.10 NON-OCCLUSIVE CORONARY ARTERY DISEASE: Status: ACTIVE | Noted: 2024-03-01

## 2024-03-01 PROBLEM — I48.20 CHRONIC ATRIAL FIBRILLATION (H): Status: RESOLVED | Noted: 2024-01-24 | Resolved: 2024-03-01

## 2024-03-18 ENCOUNTER — HOSPITAL ENCOUNTER (OUTPATIENT)
Facility: CLINIC | Age: 74
Discharge: HOME | End: 2024-03-18
Attending: SURGERY | Admitting: SURGERY
Payer: COMMERCIAL

## 2024-03-18 ENCOUNTER — APPOINTMENT (OUTPATIENT)
Dept: SURGERY | Facility: PHYSICIAN GROUP | Age: 74
End: 2024-03-18
Payer: COMMERCIAL

## 2024-03-18 ENCOUNTER — ANESTHESIA (OUTPATIENT)
Dept: SURGERY | Facility: CLINIC | Age: 74
End: 2024-03-18
Payer: COMMERCIAL

## 2024-03-18 ENCOUNTER — ANESTHESIA EVENT (OUTPATIENT)
Dept: SURGERY | Facility: CLINIC | Age: 74
End: 2024-03-18
Payer: COMMERCIAL

## 2024-03-18 VITALS
SYSTOLIC BLOOD PRESSURE: 126 MMHG | TEMPERATURE: 98.2 F | HEART RATE: 71 BPM | OXYGEN SATURATION: 95 % | BODY MASS INDEX: 36.46 KG/M2 | DIASTOLIC BLOOD PRESSURE: 80 MMHG | RESPIRATION RATE: 16 BRPM | WEIGHT: 209.1 LBS

## 2024-03-18 DIAGNOSIS — L72.3 SEBACEOUS CYST: ICD-10-CM

## 2024-03-18 PROCEDURE — 258N000003 HC RX IP 258 OP 636: Performed by: NURSE ANESTHETIST, CERTIFIED REGISTERED

## 2024-03-18 PROCEDURE — 999N000141 HC STATISTIC PRE-PROCEDURE NURSING ASSESSMENT: Performed by: SURGERY

## 2024-03-18 PROCEDURE — 250N000011 HC RX IP 250 OP 636: Performed by: NURSE ANESTHETIST, CERTIFIED REGISTERED

## 2024-03-18 PROCEDURE — 12031 INTMD RPR S/A/T/EXT 2.5 CM/<: CPT | Performed by: SURGERY

## 2024-03-18 PROCEDURE — 710N000012 HC RECOVERY PHASE 2, PER MINUTE: Performed by: SURGERY

## 2024-03-18 PROCEDURE — 360N000075 HC SURGERY LEVEL 2, PER MIN: Performed by: SURGERY

## 2024-03-18 PROCEDURE — 272N000001 HC OR GENERAL SUPPLY STERILE: Performed by: SURGERY

## 2024-03-18 PROCEDURE — 258N000003 HC RX IP 258 OP 636: Performed by: ANESTHESIOLOGY

## 2024-03-18 PROCEDURE — 11404 EXC TR-EXT B9+MARG 3.1-4 CM: CPT | Mod: 51 | Performed by: SURGERY

## 2024-03-18 PROCEDURE — 11402 EXC TR-EXT B9+MARG 1.1-2 CM: CPT | Mod: 51 | Performed by: SURGERY

## 2024-03-18 PROCEDURE — 370N000017 HC ANESTHESIA TECHNICAL FEE, PER MIN: Performed by: SURGERY

## 2024-03-18 PROCEDURE — 250N000011 HC RX IP 250 OP 636: Performed by: SURGERY

## 2024-03-18 PROCEDURE — 250N000009 HC RX 250: Performed by: NURSE ANESTHETIST, CERTIFIED REGISTERED

## 2024-03-18 PROCEDURE — 250N000025 HC SEVOFLURANE, PER MIN: Performed by: SURGERY

## 2024-03-18 PROCEDURE — 250N000009 HC RX 250: Performed by: SURGERY

## 2024-03-18 PROCEDURE — 88304 TISSUE EXAM BY PATHOLOGIST: CPT | Mod: TC | Performed by: SURGERY

## 2024-03-18 PROCEDURE — 710N000009 HC RECOVERY PHASE 1, LEVEL 1, PER MIN: Performed by: SURGERY

## 2024-03-18 RX ORDER — SODIUM CHLORIDE, SODIUM LACTATE, POTASSIUM CHLORIDE, CALCIUM CHLORIDE 600; 310; 30; 20 MG/100ML; MG/100ML; MG/100ML; MG/100ML
INJECTION, SOLUTION INTRAVENOUS CONTINUOUS
Status: DISCONTINUED | OUTPATIENT
Start: 2024-03-18 | End: 2024-03-18 | Stop reason: HOSPADM

## 2024-03-18 RX ORDER — ONDANSETRON 4 MG/1
4 TABLET, ORALLY DISINTEGRATING ORAL EVERY 30 MIN PRN
Status: DISCONTINUED | OUTPATIENT
Start: 2024-03-18 | End: 2024-03-18 | Stop reason: HOSPADM

## 2024-03-18 RX ORDER — ACETAMINOPHEN 325 MG/1
975 TABLET ORAL ONCE
Status: DISCONTINUED | OUTPATIENT
Start: 2024-03-18 | End: 2024-03-18 | Stop reason: HOSPADM

## 2024-03-18 RX ORDER — HYDROMORPHONE HCL IN WATER/PF 6 MG/30 ML
0.2 PATIENT CONTROLLED ANALGESIA SYRINGE INTRAVENOUS EVERY 5 MIN PRN
Status: DISCONTINUED | OUTPATIENT
Start: 2024-03-18 | End: 2024-03-18 | Stop reason: HOSPADM

## 2024-03-18 RX ORDER — CEFAZOLIN SODIUM/WATER 2 G/20 ML
2 SYRINGE (ML) INTRAVENOUS
Status: COMPLETED | OUTPATIENT
Start: 2024-03-18 | End: 2024-03-18

## 2024-03-18 RX ORDER — ONDANSETRON 2 MG/ML
4 INJECTION INTRAMUSCULAR; INTRAVENOUS EVERY 30 MIN PRN
Status: DISCONTINUED | OUTPATIENT
Start: 2024-03-18 | End: 2024-03-18 | Stop reason: HOSPADM

## 2024-03-18 RX ORDER — CEFAZOLIN SODIUM/WATER 2 G/20 ML
2 SYRINGE (ML) INTRAVENOUS SEE ADMIN INSTRUCTIONS
Status: DISCONTINUED | OUTPATIENT
Start: 2024-03-18 | End: 2024-03-18 | Stop reason: HOSPADM

## 2024-03-18 RX ORDER — DEXAMETHASONE SODIUM PHOSPHATE 4 MG/ML
INJECTION, SOLUTION INTRA-ARTICULAR; INTRALESIONAL; INTRAMUSCULAR; INTRAVENOUS; SOFT TISSUE PRN
Status: DISCONTINUED | OUTPATIENT
Start: 2024-03-18 | End: 2024-03-18

## 2024-03-18 RX ORDER — FENTANYL CITRATE 50 UG/ML
50 INJECTION, SOLUTION INTRAMUSCULAR; INTRAVENOUS EVERY 5 MIN PRN
Status: DISCONTINUED | OUTPATIENT
Start: 2024-03-18 | End: 2024-03-18 | Stop reason: HOSPADM

## 2024-03-18 RX ORDER — NALOXONE HYDROCHLORIDE 0.4 MG/ML
0.1 INJECTION, SOLUTION INTRAMUSCULAR; INTRAVENOUS; SUBCUTANEOUS
Status: DISCONTINUED | OUTPATIENT
Start: 2024-03-18 | End: 2024-03-18 | Stop reason: HOSPADM

## 2024-03-18 RX ORDER — OXYCODONE HYDROCHLORIDE 5 MG/1
5 TABLET ORAL
Status: DISCONTINUED | OUTPATIENT
Start: 2024-03-18 | End: 2024-03-18 | Stop reason: HOSPADM

## 2024-03-18 RX ORDER — OXYCODONE HYDROCHLORIDE 5 MG/1
5-10 TABLET ORAL EVERY 4 HOURS PRN
Qty: 6 TABLET | Refills: 0 | Status: SHIPPED | OUTPATIENT
Start: 2024-03-18 | End: 2024-07-24

## 2024-03-18 RX ORDER — GLYCOPYRROLATE 0.2 MG/ML
INJECTION, SOLUTION INTRAMUSCULAR; INTRAVENOUS PRN
Status: DISCONTINUED | OUTPATIENT
Start: 2024-03-18 | End: 2024-03-18

## 2024-03-18 RX ORDER — FENTANYL CITRATE 50 UG/ML
25 INJECTION, SOLUTION INTRAMUSCULAR; INTRAVENOUS EVERY 5 MIN PRN
Status: DISCONTINUED | OUTPATIENT
Start: 2024-03-18 | End: 2024-03-18 | Stop reason: HOSPADM

## 2024-03-18 RX ORDER — FENTANYL CITRATE 50 UG/ML
INJECTION, SOLUTION INTRAMUSCULAR; INTRAVENOUS PRN
Status: DISCONTINUED | OUTPATIENT
Start: 2024-03-18 | End: 2024-03-18

## 2024-03-18 RX ORDER — ONDANSETRON 2 MG/ML
INJECTION INTRAMUSCULAR; INTRAVENOUS PRN
Status: DISCONTINUED | OUTPATIENT
Start: 2024-03-18 | End: 2024-03-18

## 2024-03-18 RX ORDER — LIDOCAINE HYDROCHLORIDE 20 MG/ML
INJECTION, SOLUTION INFILTRATION; PERINEURAL PRN
Status: DISCONTINUED | OUTPATIENT
Start: 2024-03-18 | End: 2024-03-18

## 2024-03-18 RX ORDER — HYDROMORPHONE HCL IN WATER/PF 6 MG/30 ML
0.4 PATIENT CONTROLLED ANALGESIA SYRINGE INTRAVENOUS EVERY 5 MIN PRN
Status: DISCONTINUED | OUTPATIENT
Start: 2024-03-18 | End: 2024-03-18 | Stop reason: HOSPADM

## 2024-03-18 RX ORDER — MAGNESIUM HYDROXIDE 1200 MG/15ML
LIQUID ORAL PRN
Status: DISCONTINUED | OUTPATIENT
Start: 2024-03-18 | End: 2024-03-18 | Stop reason: HOSPADM

## 2024-03-18 RX ORDER — LIDOCAINE 40 MG/G
CREAM TOPICAL
Status: DISCONTINUED | OUTPATIENT
Start: 2024-03-18 | End: 2024-03-18 | Stop reason: HOSPADM

## 2024-03-18 RX ORDER — ONDANSETRON 4 MG/1
4 TABLET, ORALLY DISINTEGRATING ORAL EVERY 8 HOURS PRN
Qty: 4 TABLET | Refills: 0 | Status: SHIPPED | OUTPATIENT
Start: 2024-03-18 | End: 2024-07-24

## 2024-03-18 RX ORDER — PROPOFOL 10 MG/ML
INJECTION, EMULSION INTRAVENOUS PRN
Status: DISCONTINUED | OUTPATIENT
Start: 2024-03-18 | End: 2024-03-18

## 2024-03-18 RX ORDER — BUPIVACAINE HYDROCHLORIDE 5 MG/ML
INJECTION, SOLUTION EPIDURAL; INTRACAUDAL PRN
Status: DISCONTINUED | OUTPATIENT
Start: 2024-03-18 | End: 2024-03-18 | Stop reason: HOSPADM

## 2024-03-18 RX ORDER — OXYCODONE HYDROCHLORIDE 5 MG/1
10 TABLET ORAL
Status: DISCONTINUED | OUTPATIENT
Start: 2024-03-18 | End: 2024-03-18 | Stop reason: HOSPADM

## 2024-03-18 RX ADMIN — SODIUM CHLORIDE, POTASSIUM CHLORIDE, SODIUM LACTATE AND CALCIUM CHLORIDE: 600; 310; 30; 20 INJECTION, SOLUTION INTRAVENOUS at 06:31

## 2024-03-18 RX ADMIN — SUGAMMADEX 200 MG: 100 INJECTION, SOLUTION INTRAVENOUS at 08:42

## 2024-03-18 RX ADMIN — ROCURONIUM BROMIDE 50 MG: 50 INJECTION, SOLUTION INTRAVENOUS at 07:49

## 2024-03-18 RX ADMIN — GLYCOPYRROLATE 0.2 MG: 0.2 INJECTION, SOLUTION INTRAMUSCULAR; INTRAVENOUS at 07:48

## 2024-03-18 RX ADMIN — DEXAMETHASONE SODIUM PHOSPHATE 8 MG: 4 INJECTION, SOLUTION INTRA-ARTICULAR; INTRALESIONAL; INTRAMUSCULAR; INTRAVENOUS; SOFT TISSUE at 07:49

## 2024-03-18 RX ADMIN — ONDANSETRON 4 MG: 2 INJECTION INTRAMUSCULAR; INTRAVENOUS at 08:42

## 2024-03-18 RX ADMIN — PROPOFOL 180 MG: 10 INJECTION, EMULSION INTRAVENOUS at 07:48

## 2024-03-18 RX ADMIN — FENTANYL CITRATE 100 MCG: 50 INJECTION INTRAMUSCULAR; INTRAVENOUS at 07:48

## 2024-03-18 RX ADMIN — Medication 2 G: at 07:41

## 2024-03-18 RX ADMIN — PHENYLEPHRINE HYDROCHLORIDE 100 MCG: 10 INJECTION INTRAVENOUS at 08:09

## 2024-03-18 RX ADMIN — LIDOCAINE HYDROCHLORIDE 30 MG: 20 INJECTION, SOLUTION INFILTRATION; PERINEURAL at 07:48

## 2024-03-18 ASSESSMENT — ACTIVITIES OF DAILY LIVING (ADL)
ADLS_ACUITY_SCORE: 31

## 2024-03-18 NOTE — OP NOTE
General Surgery Operative Note    Pre-operative diagnosis:  Sebaceous cysts of back   Post-operative diagnosis: same   Procedure: Excision of multiple back cysts (lower back 4.0 cm), upper back (2.0 cm)   Surgeon: Ashwin Saavedra MD   Assistant(s): Candy De León PA-C  - the PA's assistance was medically necessary in providing adequate exposure in the operating field, maintaining hemostasis, cuttting suture, clamping and ligating blood vessels, and visualization of the anatomic structures throughout the surgical procedure.  RADHA Baez-student   Anesthesia: General    Estimated blood loss: 5 cc's   Drains placed: None   Complications:  None   Findings:  Lower back cyst had 2 adjacent components measuring a total of 4 cm.  These were removed without obvious residual.  The upper back revealed a small cystic component adjacent to the area of recent rupture.     Indications for operation: This is a 73-year-old woman who recently presented with a sebaceous cyst which has begun draining.  This was allowed to heal.  The patient also noted a prominent cyst on her lower back.  Excision was recommended to both of these areas.  We discussed the procedure, along with its risks and complications, in detail.  The patient agreed to proceed.    Details of the operation: After informed consent, the patient was taken the operating room, where she underwent satisfactory induction of general anesthesia.  The patient was placed in the lateral position with the left side up and was sterilely prepped and draped.  We approached the lower back side first.  The area was infiltrated with local anesthetic and an elliptical incision was made encompassing 2 adjacent puncta.  Dissection was carried down until the more inferior large cyst wall was encountered.  This was then carefully dissected out from surrounding tissue, leaving no obvious residual.  At the superior aspect of the incision, was a smaller cyst.  This was also removed  without obvious residual.  The combined lesion was 4 cm in size.  Hemostasis was assured using electrocautery.  The incision was irrigated out and the deep tissues were approximated using a 2-0 Vicryl suture.    Attention was now turned to the upper back.  Here, an elliptical incision was again made.  This covered with the punctum and the area of previous drainage.  A very small persistent cyst was noted here.  The total lesion was 2.0 cm.  Hemostasis was assured using electrocautery and incision was irrigated out.  Both skin incisions are now closed using subcuticular 3-0 Vicryl followed by skin adhesive.    The patient tolerated the procedure well and was transferred to the recovery room in satisfactory condition.  Sponge and needle counts were correct at the close of the case.    Specimens:   ID Type Source Tests Collected by Time Destination   1 : low back cyst Tissue Back, Lower SURGICAL PATHOLOGY EXAM Ashwin Saavedra MD 3/18/2024  8:28 AM    2 : upper back cyst Tissue Back, Upper SURGICAL PATHOLOGY EXAM Ashwin Saavedra MD 3/18/2024  8:38 AM            Ashwin Saavedra MD

## 2024-03-18 NOTE — DISCHARGE INSTRUCTIONS
HOME CARE FOLLOWING MINOR SURGERY  JESSE Simmons, CHAO Sheldon C. Pratt, J. Shaheen    **Restart Veronika tomorrow 3/19/24**      RESULTS:  If a biopsy of tissue was done, you may call for your final pathology report after 1p.m. two working days after surgery.  Otherwise, this will be reviewed with you at time of phone follow-up (described below).    INCISIONAL CARE:  If you have a dressing in place, keep clean and dry for 48 hours; you may replace the gauze if it becomes soiled.  After 48 hours you may remove the dressing and shower.  Do not submerse incision in water for 1 week.  If you have a Dermabond dressing (a type of skin glue), you may shower immediately.  Sutures which are beneath the skin will absorb and do not need to be removed.  Sutures you can see should be removed at your surgeon's office near 2 weeks postop, unless otherwise instructed.  If present, leave the steri-strips (white paper tapes) in place for 14 days after surgery.  If present, leave Dermabond glue in place until it wears/flakes off.  You may expect a small amount of drainage from your incision.  A lump/ridge under the incision is normal and will gradually resolve.  If it becomes red or very uncomfortable, contact the nurse at your surgeon's office to discuss whether this needs to be evaluated.    ACTIVITY:  Cautiously resume exercise and strenuous activities such as jogging, tennis, aerobics, etc. Also, be careful of stretching activities which affect the area of surgery for two weeks.    DIET:  Start with liquids and gradually resume your regular diet as tolerated.  Increased fluid intake is recommended. While taking pain medications, consider use of a stool softener, increase your fiber in your diet, or add a fiber supplement (like Metamucil, Citrucel) to help prevent constipation - a possible side effect of pain medications.    DISCOMFORT:  Local anesthetic placed at surgery should provide relief for 4-8  hours.  Begin taking pain pills before discomfort is severe.  Take the pain medication with some food, when possible, to minimize side effects.  Intermittent use of ice packs may help during the first 1-3 weeks after surgery.  Expect gradual improvement.    Over-the-counter anti-inflammatory medications (i.e. Ibuprofen/Advil/Motrin or Naprosyn/Aleve) may be used per package instructions in addition to or while tapering off the narcotic pain medications to decrease swelling and sensitivity.  DO NOT TAKE these Anti-inflammatory medications if your primary physician has advised against doing so, or if you have acid reflux, ulcer, or bleeding disorder, or take blood-thinner medications.  Call your primary physician or the surgery office if you have medication questions.      FOLLOW-UP AFTER SURGERY:  -Our office will contact you approximately 2-3 weeks after surgery to check on your progress and answer any questions you may have.  If you are doing well, you will not need to return for an office appointment.  If any concerns are identified over the phone, we will help you make an appointment to see a provider.    -If you have not received a phone call, have any questions or concerns, or would like to be seen, please call us at [phone].  We are located at: [address], [address]; [place] [address]    -CONTACT US IF THE FOLLOWING DEVELOPS:   1. A fever that is above 101     2. Increased redness, warmth, drainage, bleeding, or swelling.   3. Pain that is not relieved by rest/ice and your prescription.   4.  Increasing pain after 48 hours.   5. Drainage that is thick, cloudy, yellow, green or white.   6. Any other questions or concerns.      FREQUENTLY ASKED QUESTIONS:    Q:  How should my incision look?    A:  Normally your incision will appear slightly swollen with light redness directly along the incision itself as it heals.  It may feel like a bump or ridge as the healing/scarring happens, and over time  (3-4 months) this bump or ridge feeling should slowly go away.  In general, clear or pink watery drainage can be normal at first as your incision heals, but should decrease over time.    Q:  How do I know if my incision is infected?  A:  Look at your incision for signs of infection, like redness around the incision spreading to surrounding skin, or drainage of cloudy or foul-smelling drainage.  If you feel warm, check your temperature to see if you are running a fever.    **If any of these things occur, please notify the nurse at our office.  We may need you to come into the office for an incision check.      Q:  How do I take care of my incision?  A:  If you have a dressing in place - Starting the day after surgery, replace the dressing 1-2 times a day until there is no further drainage from the incision.  At that time, a dressing is no longer needed.  Try to minimize tape on the skin if irritation is occurring at the tape sites.  If you have significant irritation from tape on the skin, please call the office to discuss other method of dressing your incision.    Small pieces of tape called  steri-strips  may be present directly overlying your incision; these may be removed 10 days after surgery unless otherwise specified by your surgeon.  If these tapes start to loosen at the ends, you may trim them back until they fall off or are removed.    A:  If you had  Dermabond  tissue glue used as a dressing (this causes your incision to look shiny with a clear covering over it) - This type of dressing wears off with time and does not require more dressings over the top unless it is draining around the glue as it wears off.  Do not apply ointments or lotions over the incisions until the glue has completely worn off.    Q:  There is a piece of tape or a sticky  lead  still on my skin.  Can I remove this?  A:  Sometimes the sticky  leads  used for monitoring during surgery or for evaluation in the emergency department are  not all removed while you are in the hospital.  These sometimes have a tab or metal dot on them.  You can easily remove these on your own, like taking off a band-aid.  If there is a gel substance under the  lead , simply wipe/clean it off with a washcloth or paper towel.      Q:  What can I do to minimize constipation (very hard stools, or lack of stools)?  A:  Stay well hydrated.  Increase your dietary fiber intake or take a fiber supplement -with plenty of water.  Walk around frequently.  You may consider an over-the-counter stool-softener.  Your Pharmacist can assist you with choosing one that is stocked at your pharmacy.  Constipation is also one of the most common side effects of pain medication.  If you are using pain medication, be pro-active and try to PREVENT problems with constipation by taking the steps above BEFORE constipation becomes a problem.    Q:  What do I do if I need more pain medications?  A:  Call the office to receive refills.  Be aware that certain pain meds cannot be called into a pharmacy and actually require a paper prescription.  A change may be made in your pain med as you progress thru your recovery period or if you have side effects to certain meds.    --Pain meds are NOT refilled after 5pm on weekdays, and NOT AT ALL on the weekends, so please look ahead to prevent problems.      Q:  Why am I having a hard time sleeping now that I am at home?  A:  Many medications you receive while you are in the hospital can impact your sleep for a number of days after your surgery/hospitalization.  Decreased level of activity and naps during the day may also make sleeping at night difficult.  Try to minimize day-time naps, and get up frequently during the day to walk around your home during your recovery time.  Sleep aides may be of some help, but are not recommended for long-term use.      Q:  I am having some back discomfort.  What should I do?  A:  This may be related to certain positioning that  was required for your surgery, extended periods of time in bed, or other changes in your overall activity level.  You may try ice, heat, acetaminophen, or ibuprofen to treat this temporarily.  Note that many pain medications have acetaminophen in them and would state this on the prescription bottle.  Be sure not to exceed the maximum of 4000mg per day of acetaminophen.     **If the pain you are having does not resolve, is severe, or is a flare of back pain you have had on other occasions prior to surgery, please contact your primary physician for further recommendations or for an appointment to be examined at their office.    Q:  Why am I having headaches?  A:  Headaches can be caused by many things:  caffeine withdrawal, use of pain meds, dehydration, high blood pressure, lack of sleep, over-activity/exhaustion, flare-up of usual migraine headaches.  If you feel this is related to muscle tension (a band-like feeling around the head, or a pressure at the low-back of the head) you may try ice or heat to this area.  You may need to drink more fluids (try electrolyte drink like Gatorade), rest, or take your usual migraine medications.   **If your headaches do not resolve, worsen, are accompanied by other symptoms, or if your blood pressure is high, please call your primary physician for recommendation and/or examination.    Q:  I am unable to urinate.  What do I do?  A:  A small percentage of people can have difficulty urinating initially after surgery.  This includes being able to urinate only a very small amount at a time and feeling discomfort or pressure in the very low abdomen.  This is called  urinary retention , and is actually an urgent situation.  Proceed to your nearest Emergency department for evaluation (not an Urgent Care Center).  Sometimes the bladder does not work correctly after certain medications you receive during surgery, or related to certain procedures.  You may need to have a catheter placed  until your bladder recovers.  When planning to go to an Emergency department, it may help to call the ER to let them know you are coming in for this problem after a surgery.  This may help you get in quicker to be evaluated.  **If you have symptoms of a urinary tract infection, please contact your primary physician for the proper evaluation and treatment.        If you have other questions, please call the office Monday thru Friday between 8am and 4:30pm to discuss with the nurse or physician assistant.  #[phone]    There is a surgeon ON CALL on weekday evenings and over the weekend in case of urgent need only, and may be contacted at the same number.    If you are having an emergency, call 911 or proceed to your nearest emergency department.

## 2024-03-18 NOTE — ANESTHESIA PROCEDURE NOTES
Airway       Patient location during procedure: OR       Procedure Start/Stop Times: 3/18/2024 7:51 AM  Staff -        CRNA: Funmilayo Mcghee APRN CRNA       Performed By: CRNA  Consent for Airway        Urgency: elective  Indications and Patient Condition       Indications for airway management: mai-procedural       Induction type:intravenous       Mask difficulty assessment: 1 - vent by mask    Final Airway Details       Final airway type: endotracheal airway       Successful airway: ETT - single and Oral  Endotracheal Airway Details        ETT size (mm): 7.0       Cuffed: yes       Successful intubation technique: video laryngoscopy (for purposes of showing med student the airway.)       VL Blade Size: Glidescope 3       Grade View of Cords: 1       Adjucts: stylet       Position: Right       Measured from: lips       Secured at (cm): 22       Bite block used: None    Post intubation assessment        Placement verified by: capnometry, equal breath sounds and chest rise        Number of attempts at approach: 1       Number of other approaches attempted: 0       Secured with: tape       Ease of procedure: easy       Dentition: Unchanged    Medication(s) Administered   Medication Administration Time: 3/18/2024 7:51 AM

## 2024-03-18 NOTE — ANESTHESIA POSTPROCEDURE EVALUATION
Patient: Nataly Kee    Procedure: Procedure(s):  Excision of multiple back cysts       Anesthesia Type:  General    Note:  Disposition: Outpatient   Postop Pain Control: Uneventful            Sign Out: Well controlled pain   PONV: No   Neuro/Psych: Uneventful            Sign Out: Acceptable/Baseline neuro status   Airway/Respiratory: Uneventful            Sign Out: Acceptable/Baseline resp. status   CV/Hemodynamics: Uneventful            Sign Out: Acceptable CV status; No obvious hypovolemia; No obvious fluid overload   Other NRE: NONE   DID A NON-ROUTINE EVENT OCCUR? No           Last vitals:  Vitals Value Taken Time   /79 03/18/24 1005   Temp 97.6  F (36.4  C) 03/18/24 1000   Pulse 63 03/18/24 1008   Resp 7 03/18/24 1008   SpO2 96 % 03/18/24 1009   Vitals shown include unfiled device data.    Electronically Signed By: Sy Valencia MD  March 18, 2024  4:23 PM

## 2024-03-18 NOTE — ANESTHESIA PREPROCEDURE EVALUATION
Anesthesia Pre-Procedure Evaluation    Patient: Nataly Kee   MRN: [id number] : 1950        Procedure : Procedure(s):  Excision of multiple back cysts          Past Medical History:   Diagnosis Date    Antiplatelet or antithrombotic long-term use     Arrhythmia     Depression     Depressive disorder     GERD (gastroesophageal reflux disease)     Sleep apnea       Past Surgical History:   Procedure Laterality Date    COLONOSCOPY  2015    tubular adenoma    HC REMOVAL OF TONSILS,12+ Y/O      age 20    IR PULMONARY ANGIOGRAM BILATERAL  2022    ZZC LIGATE FALLOPIAN TUBE  1983    ZZC VAGINAL HYSTERECTOMY  2001    Hysterectomy, Vaginal - fibroids    ZZHC COLONOSCOPY THRU STOMA, DIAGNOSTIC  2010    3 mm polyp in descending colon/ Dr Asencio      Allergies   Allergen Reactions    No Known Drug Allergy       Social History     Tobacco Use    Smoking status: Former     Types: Cigarettes     Quit date: 1975     Years since quittin.2     Passive exposure: Past    Smokeless tobacco: Never   Substance Use Topics    Alcohol use: Not Currently      Wt Readings from Last 1 Encounters:   24 94.8 kg (209 lb 1.6 oz)        Anesthesia Evaluation   Pt has had prior anesthetic. Type: General.    No history of anesthetic complications       ROS/MED HX  ENT/Pulmonary:     (+) sleep apnea, uses CPAP,                                      Neurologic:  - neg neurologic ROS     Cardiovascular:     (+)  - -  CAD -  - -   Taking blood thinners                              Previous cardiac testing   Echo: Date: 2023 Results:  Essentially nl stress echo at rest with LV hypokinesis during exercise  Stress Test:  Date: Results:    ECG Reviewed:  Date: Results:    Cath:  Date: Results:      METS/Exercise Tolerance:     Hematologic:  - neg hematologic  ROS     Musculoskeletal:  - neg musculoskeletal ROS     GI/Hepatic:     (+) GERD, Asymptomatic on medication,                  Renal/Genitourinary:  -  "neg Renal ROS     Endo:     (+)          thyroid problem, hypothyroidism,    Obesity,       Psychiatric/Substance Use:  - neg psychiatric ROS     Infectious Disease:  - neg infectious disease ROS     Malignancy:       Other:            Physical Exam    Airway        Mallampati: III   TM distance: > 3 FB   Neck ROM: full   Mouth opening: > 3 cm    Respiratory Devices and Support         Dental       (+) Minor Abnormalities - some fillings, tiny chips      Cardiovascular   cardiovascular exam normal          Pulmonary   pulmonary exam normal                OUTSIDE LABS:  CBC:   Lab Results   Component Value Date    WBC 4.6 01/24/2024    WBC 6.4 10/10/2023    HGB 14.8 01/24/2024    HGB 14.3 10/10/2023    HCT 45.6 01/24/2024    HCT 44.6 10/10/2023     01/24/2024     10/10/2023     BMP:   Lab Results   Component Value Date    .4 01/24/2024    .1 01/31/2023    POTASSIUM 4.32 01/24/2024    POTASSIUM 4.96 01/31/2023    CHLORIDE 104.5 01/24/2024    CHLORIDE 105.2 01/31/2023    CO2 26.8 01/24/2024    CO2 25.9 01/31/2023    BUN 13 01/24/2024    BUN 16.9 01/24/2024    CR 0.77 01/24/2024    CR 0.9 06/02/2023    GLC 89 01/24/2024     (A) 01/31/2023     COAGS:   Lab Results   Component Value Date    PTT 35.6 (H) 11/08/2001    INR 1.13 02/17/2022     POC: No results found for: \"BGM\", \"HCG\", \"HCGS\"  HEPATIC:   Lab Results   Component Value Date    ALBUMIN 3.8 01/24/2024    PROTTOTAL 6.2 01/24/2024    ALT 11 02/19/2022    AST 10 02/19/2022    ALKPHOS 85 01/24/2024    BILITOTAL 1.3 (A) 01/24/2024    BILIDIRECT 0.1 12/18/2010     OTHER:   Lab Results   Component Value Date    PH 5.5 01/09/2012    JOSE L 9.4 01/24/2024    MAG 2.1 02/16/2022    TSH 4.22 10/10/2023    T4 1.00 02/16/2022       Anesthesia Plan    ASA Status:  3       Anesthesia Type: General.     - Airway: ETT   Induction: Intravenous.   Maintenance: Balanced.        Consents    Anesthesia Plan(s) and associated risks, benefits, and realistic " "alternatives discussed. Questions answered and patient/representative(s) expressed understanding.     - Discussed:     - Discussed with:  Patient      - Extended Intubation/Ventilatory Support Discussed: No.      - Patient is DNR/DNI Status: No     Use of blood products discussed: No .     Postoperative Care    Pain management: IV analgesics, Oral pain medications, Multi-modal analgesia.   PONV prophylaxis: Dexamethasone or Solumedrol, Ondansetron (or other 5HT-3)     Comments:               Sy Valencia MD    I have reviewed the pertinent notes and labs in the chart from the past 30 days and (re)examined the patient.  Any updates or changes from those notes are reflected in this note.            # Drug Induced Coagulation Defect: home medication list includes an anticoagulant medication   # Obesity: Estimated body mass index is 36.46 kg/m  as calculated from the following:    Height as of 2/28/24: 1.613 m (5' 3.5\").    Weight as of this encounter: 94.8 kg (209 lb 1.6 oz).      "

## 2024-03-18 NOTE — ANESTHESIA CARE TRANSFER NOTE
Patient: Nataly Kee    Procedure: Procedure(s):  Excision of multiple back cysts       Diagnosis: Sebaceous cyst [L72.3]  Diagnosis Additional Information: No value filed.    Anesthesia Type:   General     Note:    Oropharynx: oropharynx clear of all foreign objects and spontaneously breathing  Level of Consciousness: awake  Oxygen Supplementation: face mask  Level of Supplemental Oxygen (L/min / FiO2): 6  Independent Airway: airway patency satisfactory and stable  Dentition: dentition unchanged  Vital Signs Stable: post-procedure vital signs reviewed and stable  Report to RN Given: handoff report given  Patient transferred to: PACU    Handoff Report: Identifed the Patient, Identified the Reponsible Provider, Reviewed the pertinent medical history, Discussed the surgical course, Reviewed Intra-OP anesthesia mangement and issues during anesthesia, Set expectations for post-procedure period and Allowed opportunity for questions and acknowledgement of understanding      Vitals:  Vitals Value Taken Time   /90 03/18/24 0902   Temp 97.2  F (36.2  C) 03/18/24 0902   Pulse 76 03/18/24 0905   Resp 18 03/18/24 0905   SpO2 98 % 03/18/24 0905   Vitals shown include unfiled device data.    Electronically Signed By: WESLY Arce CRNA  March 18, 2024  9:06 AM

## 2024-03-19 LAB
PATH REPORT.COMMENTS IMP SPEC: NORMAL
PATH REPORT.COMMENTS IMP SPEC: NORMAL
PATH REPORT.FINAL DX SPEC: NORMAL
PATH REPORT.GROSS SPEC: NORMAL
PATH REPORT.MICROSCOPIC SPEC OTHER STN: NORMAL
PATH REPORT.RELEVANT HX SPEC: NORMAL
PHOTO IMAGE: NORMAL

## 2024-03-19 PROCEDURE — 88304 TISSUE EXAM BY PATHOLOGIST: CPT | Mod: 26 | Performed by: PATHOLOGY

## 2024-04-02 ENCOUNTER — TELEPHONE (OUTPATIENT)
Dept: SURGERY | Facility: CLINIC | Age: 74
End: 2024-04-02
Payer: COMMERCIAL

## 2024-04-02 NOTE — CONFIDENTIAL NOTE
[facility]  Post op call note     Nataly Kee was called for an update regarding her recovery.  She underwent excision of multiple back cysts by Dr. Saavedra on 3/18/2024. Today she tells me she is doing well and denies any complaints. She is eating a normal diet and her bowels are regular. She states her wounds are healing well.    The pathology revealed benign epiderma inclusion cysts.  This was discussed with the patient.     She was instructed to slowly and gradually resume all normal activities. She states all of her questions were answered.  She understands our discussion.  She agrees to follow up as needed or to call our office with any concerns.    Candy De León PA-C

## 2024-07-21 DIAGNOSIS — F32.5 MAJOR DEPRESSION IN REMISSION (H): ICD-10-CM

## 2024-07-21 DIAGNOSIS — K21.9 GASTROESOPHAGEAL REFLUX DISEASE WITHOUT ESOPHAGITIS: ICD-10-CM

## 2024-07-21 DIAGNOSIS — E78.2 MIXED HYPERLIPIDEMIA: ICD-10-CM

## 2024-07-22 RX ORDER — ATORVASTATIN CALCIUM 40 MG/1
40 TABLET, FILM COATED ORAL DAILY
COMMUNITY
Start: 2024-07-22

## 2024-07-22 NOTE — TELEPHONE ENCOUNTER
Nataly Kee is requesting a refill of:    Refused Prescriptions:                       Disp   Refills    omeprazole (PRILOSEC) 20 MG DR capsule [Ph*                Sig: TAKE 1 CAPSULE DAILY  Refused By: PAIGE MONTENEGRO  Reason for Refusal: Patient needs appointment    atorvastatin (LIPITOR) 40 MG tablet [Pharm*                Sig: TAKE 1 TABLET DAILY  Refused By: PAIGE MONTENEGRO  Reason for Refusal: Patient needs appointment    FLUoxetine (PROZAC) 20 MG capsule [Pharmac*                Sig: TAKE 1 CAPSULE DAILY  Refused By: PAIGE MONTENEGRO  Reason for Refusal: Patient needs appointment    Will refill at pt's upcoming appt on 07/24

## 2024-07-23 ENCOUNTER — TELEPHONE (OUTPATIENT)
Dept: CARDIOLOGY | Facility: CLINIC | Age: 74
End: 2024-07-23
Payer: COMMERCIAL

## 2024-07-23 DIAGNOSIS — Z86.711 HISTORY OF PULMONARY EMBOLISM: ICD-10-CM

## 2024-07-23 DIAGNOSIS — I47.19 ATRIAL TACHYCARDIA (H): ICD-10-CM

## 2024-07-23 RX ORDER — METOPROLOL SUCCINATE 25 MG/1
25 TABLET, EXTENDED RELEASE ORAL 2 TIMES DAILY
Qty: 180 TABLET | Refills: 0 | Status: SHIPPED | OUTPATIENT
Start: 2024-07-23

## 2024-07-23 NOTE — TELEPHONE ENCOUNTER
Metoprolol and Eliquis refilled per protocol. Atorvastatin managed by primary care, medication not refilled.

## 2024-07-23 NOTE — TELEPHONE ENCOUNTER
[facility] Call Center    Phone Message    May a detailed message be left on voicemail: yes     Reason for Call: Medication Refill Request    Has the patient contacted the pharmacy for the refill? Yes   Name of medication being requested:   atorvastatin (LIPITOR) 40 MG tablet  apixaban ANTICOAGULANT (ELIQUIS) 5 MG tablet  metoprolol succinate ER (TOPROL XL) 25 MG 24 hr tablet  Provider who prescribed the medication: Beba and another provider.   Pharmacy:    [facility] - [address]    Date medication is needed: some left    Patient is scheduled on 10/7/24      Action Taken: Other: cardiology     Travel Screening: Not Applicable     Date of Service:

## 2024-07-24 ENCOUNTER — OFFICE VISIT (OUTPATIENT)
Dept: FAMILY MEDICINE | Facility: CLINIC | Age: 74
End: 2024-07-24

## 2024-07-24 VITALS
WEIGHT: 207 LBS | TEMPERATURE: 97 F | OXYGEN SATURATION: 91 % | DIASTOLIC BLOOD PRESSURE: 72 MMHG | SYSTOLIC BLOOD PRESSURE: 122 MMHG | BODY MASS INDEX: 36.09 KG/M2 | HEART RATE: 60 BPM

## 2024-07-24 DIAGNOSIS — E78.2 MIXED HYPERLIPIDEMIA: ICD-10-CM

## 2024-07-24 DIAGNOSIS — K21.9 GASTROESOPHAGEAL REFLUX DISEASE WITHOUT ESOPHAGITIS: ICD-10-CM

## 2024-07-24 DIAGNOSIS — F32.5 MAJOR DEPRESSION IN REMISSION (H): ICD-10-CM

## 2024-07-24 LAB
ALBUMIN SERPL-MCNC: 4.1 G/DL (ref 3.6–5.1)
ALP SERPL-CCNC: 82 U/L (ref 33–130)
ALT 1742-6: 28 U/L (ref 0–32)
AST 1920-8: 18 U/L (ref 0–35)
BILIRUB SERPL-MCNC: 1.5 MG/DL (ref 0.2–1.2)
BUN SERPL-MCNC: 13 MG/DL (ref 7–25)
BUN/CREATININE RATIO: 14 (ref 6–32)
CALCIUM SERPL-MCNC: 10.2 MG/DL (ref 8.6–10.3)
CHLORIDE SERPLBLD-SCNC: 104.2 MMOL/L (ref 98–110)
CHOLEST SERPL-MCNC: 168 MG/DL (ref 0–199)
CHOLEST/HDLC SERPL: 4 {RATIO} (ref 0–5)
CO2 SERPL-SCNC: 28 MMOL/L (ref 20–32)
CREAT SERPL-MCNC: 0.91 MG/DL (ref 0.6–1.3)
GLUCOSE SERPL-MCNC: 99 MG/DL (ref 60–99)
HDLC SERPL-MCNC: 42 MG/DL (ref 40–150)
LDLC SERPL CALC-MCNC: 96 MG/DL
POTASSIUM SERPL-SCNC: 4.66 MMOL/L (ref 3.5–5.3)
PROT SERPL-MCNC: 6.6 G/DL (ref 6.1–8.1)
SODIUM SERPL-SCNC: 138 MMOL/L (ref 135–146)
TRIGL SERPL-MCNC: 150 MG/DL (ref 0–149)

## 2024-07-24 PROCEDURE — 99213 OFFICE O/P EST LOW 20 MIN: CPT | Performed by: PHYSICIAN ASSISTANT

## 2024-07-24 PROCEDURE — 80061 LIPID PANEL: CPT | Performed by: PHYSICIAN ASSISTANT

## 2024-07-24 PROCEDURE — 36415 COLL VENOUS BLD VENIPUNCTURE: CPT | Performed by: PHYSICIAN ASSISTANT

## 2024-07-24 PROCEDURE — 80053 COMPREHEN METABOLIC PANEL: CPT | Performed by: PHYSICIAN ASSISTANT

## 2024-07-24 RX ORDER — ATORVASTATIN CALCIUM 40 MG/1
40 TABLET, FILM COATED ORAL DAILY
Qty: 90 TABLET | Refills: 3 | Status: SHIPPED | OUTPATIENT
Start: 2024-07-24 | End: 2024-10-07

## 2024-07-24 ASSESSMENT — ANXIETY QUESTIONNAIRES
7. FEELING AFRAID AS IF SOMETHING AWFUL MIGHT HAPPEN: NOT AT ALL
1. FEELING NERVOUS, ANXIOUS, OR ON EDGE: SEVERAL DAYS
3. WORRYING TOO MUCH ABOUT DIFFERENT THINGS: NOT AT ALL
5. BEING SO RESTLESS THAT IT IS HARD TO SIT STILL: NOT AT ALL
IF YOU CHECKED OFF ANY PROBLEMS ON THIS QUESTIONNAIRE, HOW DIFFICULT HAVE THESE PROBLEMS MADE IT FOR YOU TO DO YOUR WORK, TAKE CARE OF THINGS AT HOME, OR GET ALONG WITH OTHER PEOPLE: NOT DIFFICULT AT ALL
6. BECOMING EASILY ANNOYED OR IRRITABLE: NOT AT ALL
GAD7 TOTAL SCORE: 2
GAD7 TOTAL SCORE: 2
2. NOT BEING ABLE TO STOP OR CONTROL WORRYING: NOT AT ALL

## 2024-07-24 ASSESSMENT — PATIENT HEALTH QUESTIONNAIRE - PHQ9
5. POOR APPETITE OR OVEREATING: SEVERAL DAYS
SUM OF ALL RESPONSES TO PHQ QUESTIONS 1-9: 2

## 2024-07-24 NOTE — NURSING NOTE
Chief Complaint   Patient presents with    Recheck Medication     Fasting, refills, would like to talk about getting refills of cardiologist meds transferred to us eventually.     Pre-visit Screening:  Immunizations:  up to date  Colonoscopy:  is up to date  Mammogram: is up to date  Asthma Action Test/Plan:  na  PHQ9:  na  GAD7:  na  Questioned patient about current smoking habits Pt. quit smoking some time ago.  Ok to leave detailed message on voice mail for today's visit only yes, phone # [phone] (home)

## 2024-07-24 NOTE — PROGRESS NOTES
CC: Medication Check    History:  Anxiety, Depression:  Stable on fluoxetine 20 mg daily. Denies any side effects. Feels like it is working well to control symptoms. Is having some stress with her daughter's health.     GERD:  Takes omeprazole 20 mg daily. Has tried lower dose, but symptoms return. This works well to control symptoms.     Mixed hyperlipidemia:  Takes atorvastatin 40 mg daily. No side effects.     PMH, MEDICATIONS, ALLERGIES, SOCIAL AND FAMILY HISTORY in [facility] and reviewed by me personally.    ROS negative other than the symptoms noted above in the HPI.      Examination  /72 (BP Location: Right arm, Patient Position: Sitting, Cuff Size: Adult Large)   Pulse 60   Temp 97  F (36.1  C) (Temporal)   Wt 93.9 kg (207 lb)   LMP 09/03/2001   SpO2 91%   BMI 36.09 kg/m         Constitutional: Sitting comfortably, in no acute distress. Vital signs noted  Neck:  no adenopathy, trachea midline and normal to palpation, thyroid normal to palpation  Cardiovascular:  regular rate and rhythm, no murmurs, clicks, or gallops  Respiratory:  normal respiratory rate and rhythm, lungs clear to auscultation  SKIN: No jaundice/pallor/rash.   Psychiatric: mentation appears normal and affect normal/bright    A/P    ICD-10-CM    1. Mixed hyperlipidemia  E78.2 atorvastatin (LIPITOR) 40 MG tablet     VENOUS COLLECTION     Comprehensive Metobolic Panel ([facility])     Lipid Panel ([facility])      2. Major depression in remission (H24)  F32.5 FLUoxetine (PROZAC) 20 MG capsule      3. Gastroesophageal reflux disease without esophagitis  K21.9 omeprazole (PRILOSEC) 20 MG DR capsule          DISCUSSION:  Anxiety, Depression:  PHQ-9 and DENTON-7 updated today and well controlled. Agreed to refill current medication without change for 1 year. Contact us sooner with concerns, worsening.    GERD:  Working well. Will refill medication without change for 1 year.     Mixed hyperlipidemia:  Will check fasting labs today and send MyChart with  results when available. Will refill medication without change for 1 year.     follow up visit: 1 year    Manuela Barone PA-C  [facility]

## 2024-08-05 NOTE — NURSING NOTE
Chief Complaint   Patient presents with    Recheck Medication     Fasting today, refill medications    Cyst     Cyst on back hat burst over the weekend     Pre-visit Screening:  Immunizations:  up to date  Colonoscopy:  is up to date  Mammogram: is due and ordered today  Asthma Action Test/Plan:  NA  PHQ9:  Done today  GAD7:  Done today  Questioned patient about current smoking habits Pt. has quit smoking.  Ok to leave detailed message on voice mail for today's visit only Yes, phone # [phone]     [facility] CONSULT NOTE    Referring Provider: Dread Muniz MD   Reason for Consultation: Acute kidney injury, metabolic acidosis, hyperkalemia    Chief complaint generalized weakness    History of present illness: Patient is 79 years old male with a history of hypertension, type 2 diabetes, dementia, brought to the hospital with generalized weakness and some confusion.  As per patient's wife, he started feeling sick about a week ago after eating out and had nausea and vomiting.  Since then he has been feeling very tired with decreased appetite and oral intake.  Patient is not very good historian but admits decreased urination.  No reported fever, cough, expectoration, hematuria or dysuria.  Patient lab workup showed acute renal failure along with hyperkalemia and severe metabolic acidosis.  Patient blood sugar was not very high but beta-hydroxybutyrate level was high.  CT abdomen showed marked distention of urinary bladder and moderate bilateral hydronephrosis.      History  No past medical history on file.  No past surgical history on file.     No family history on file.    Review of Systems  ROS  As per HPI  Objective     Vital Signs  Temp:  [98.6 °F (37 °C)] 98.6 °F (37 °C)  Heart Rate:  [] 93  Resp:  [16-20] 16  BP: (123-190)/(75-99) 190/76    I/O this shift:  In: 2325 [I.V.:275; IV Piggyback:2050]  Out: 1300 [Urine:1300]  No intake/output data recorded.    Physical Exam:  Physical Exam    General Appearance: Chronically ill-appearing  Skin: warm and dry  HEENT: oral mucosa dry, nonicteric sclera  Neck: supple, no JVD  Lungs: CTA  Heart: RRR, normal S1 and S2  Abdomen: soft, nontender, nondistended  : no palpable bladder  Extremities: no edema, cyanosis or clubbing  Neuro: normal speech and mental status     Results Review:   I reviewed the patient's new clinical results.    Lab Results   Component Value Date    CALCIUM 8.2 (L) 08/04/2024    PHOS 9.2 (H) 08/04/2024     Results from last 7 days  "  Lab Units 08/04/24 1958 08/04/24  1814   MAGNESIUM mg/dL 2.5* 2.7*   SODIUM mmol/L 135* 127*   POTASSIUM mmol/L 6.2* 7.0*   CHLORIDE mmol/L 92* 87*   CO2 mmol/L 13.5* 11.0*   BUN mg/dL 175* 189*   CREATININE mg/dL 16.55* 17.15*   GLUCOSE mg/dL 151* 162*   CALCIUM mg/dL 8.2* 8.8   WBC 10*3/mm3  --  18.10*   HEMOGLOBIN g/dL  --  15.0   PLATELETS 10*3/mm3  --  297   ALT (SGPT) U/L  --  27   AST (SGOT) U/L  --  19     Lab Results   Component Value Date    TROPONINT 44 (H) 08/04/2024     Estimated Creatinine Clearance: 4.2 mL/min (A) (by C-G formula based on SCr of 16.55 mg/dL (H)).No results found for: \"URICACID\"    Brief Urine Lab Results  (Last result in the past 365 days)        Color   Clarity   Blood   Leuk Est   Nitrite   Protein   CREAT   Urine HCG        08/04/24 1951 Yellow   Clear   Large (3+)   Small (1+)   Negative   30 mg/dL (1+)                   Prior to Admission medications    Medication Sig Start Date End Date Taking? Authorizing Provider   aspirin 81 MG EC tablet Take 1 tablet by mouth Daily.   Yes Araseli Price MD   atorvastatin (LIPITOR) 10 MG tablet Take 1 tablet by mouth Daily.   Yes Araseli Price MD   Dapagliflozin Propanediol (FARXIGA PO) Take  by mouth Daily.   Yes Araseli Price MD   donepezil (ARICEPT) 10 MG tablet Take 1 tablet by mouth Every Night. 10/4/23 10/3/24 Yes Araseli Price MD   memantine (NAMENDA) 10 MG tablet Take 1 tablet by mouth Daily. 11/6/23 11/5/24 Yes Araseli Price MD   metFORMIN (GLUCOPHAGE) 1000 MG tablet Take 1 tablet by mouth 2 (Two) Times a Day With Meals. 1.5 tablets in AM; 1 tablet in PM   Yes Araseli Price MD   Semaglutide,0.25 or 0.5MG/DOS, (OZEMPIC) 2 MG/1.5ML solution pen-injector Inject 0.25 mg under the skin into the appropriate area as directed 1 (One) Time Per Week.   Yes Provider, Araseli, MD   silodosin (RAPAFLO) 8 MG capsule capsule Take 1 capsule by mouth Daily. With a meal   Yes Provider, Araseli, " MD       senna-docusate sodium, 2 tablet, Oral, BID  sodium chloride, 10 mL, Intravenous, Q12H  sodium chloride, 10 mL, Intravenous, Q12H      dextrose 5 % and sodium chloride 0.45 %, 150 mL/hr  dextrose 5 % and sodium chloride 0.45 % with KCl 20 mEq/L, 150 mL/hr  dextrose 5 % and sodium chloride 0.45 % with KCl 40 mEq/L, 150 mL/hr  dextrose 5 % and sodium chloride 0.9 %, 150 mL/hr, Last Rate: 150 mL/hr (08/04/24 2028)  dextrose 5 % and sodium chloride 0.9 % with KCl 20 mEq, 150 mL/hr  dextrose 5% and sodium chloride 0.9% with KCl 40 mEq/L, 150 mL/hr  insulin, 0-100 Units/hr, Last Rate: 1 Units/hr (08/04/24 2033)  sodium bicarbonate, 150 mEq  sodium chloride 0.45 % 1,000 mL with potassium chloride 40 mEq infusion, 250 mL/hr  sodium chloride, 250 mL/hr  sodium chloride 0.45 % with KCl 20 mEq, 250 mL/hr  sodium chloride, 1,000 mL/hr, Last Rate: 1,000 mL/hr (08/04/24 1954)  sodium chloride, 125 mL/hr, Last Rate: Stopped (08/04/24 2027)  sodium chloride, 250 mL/hr  sodium chloride 0.9 % with KCl 20 mEq, 250 mL/hr  sodium chloride 0.9 % with KCl 40 mEq/L, 250 mL/hr        Assessment & Plan       Acute kidney injury.  Secondary to obstruction.  CT abdomen showed marked distention of bladder with bilateral hydronephrosis.  Rodriguez catheter in place and has good urine output  Hyperkalemia.  Secondary to acute kidney injury and metabolic acidosis.  Patient received IV calcium gluconate and bolus IV sodium bicarbonate.  Patient started on insulin drip  Acute metabolic acidosis.  Secondary to acute kidney injury and possibly DKA  Diabetic ketoacidosis?  Euglycemic.  Patient was on SGLT2 inhibitor  Hyponatremia  Bladder outlet obstruction with bilateral hydronephrosis.  Rodriguez catheter in place with good urine output  Hyperphosphatemia.  Secondary to renal failure  Hypertension.  Blood pressure very high, stress-induced    Plan:  Patient receiving IV fluid boluses  Start bicarb drip after fluid boluses  Check BMP every 4 hours.    Correcting hyperkalemia medically.  Potassium already improving, hopefully will not require dialysis  Patient started on IV hydralazine as needed for blood pressure control for now      I discussed the patients findings and my recommendations with patient, family, nursing staff, and consulting provider    Yair Salas MD  08/04/24  20:46 EDT

## 2024-09-09 NOTE — PROGRESS NOTES
"Individualized Treatment Plan       Patient's Name: Lora Cardenas   Preferred Name: Lora  Pronouns:  She/Her   :   1974  MRN:   [id number]    Program Admission Date: 9/10/2024     Estimated Program Discharge Date: 24 (Please note, this date is subject to change based on needs and progress toward identified goals.)      Date of Initial Treatment Plan: 9/10/2024    Chief Concern: I am in this program because: \"I was discharged from [facility] on labor day.  I had something happen in my immediate family with one of my sons. He wrote me a letter and it spiraled me into a really dark place. I was not able to stop crying, I was having suicidal ideation. I had my plan, was writing my letters. My friend Swathi happened to stop by and helped me call [phone]. We made a asfety plan and I gave Swathi all my pills.  The next day I felt worse and could not cope\".  She also reports compounding losses: uncle and father in law passed away this year.    Long Term Goal: \"Skills and tactics to cope;  healing; easing my pain\"  \"Breathing in my future and exhaling my past.\"     Goal Areas:  GOAL AREA 1: Personal Safety       Goal Status: Stopped   Description:   Suicidal ideation: High \"Moderate to high. Yesterday I wanted to buy all the nyquil and drink it in my car. Those thoughts were surprising, I never had thoughts like this before\".  Self-injury urges: Low \"I don't like feeling pain\"  Last acted on self-injury: N/A on , she had a thought to take a large portion of her medicine  Skills that help me cope with self-injury: \"I do have a safety plan that I made with [phone], that's in effect. My  has all my medications and they are locked up.  Also I can call  or friend Swathi who knows everything\"  While in program I would like to accomplish: \"Learn to trust myself again\"  I will know I am making progress when: \"I accept myself, where I am and I am equipped to face future challenges\"  I think I " Chief Complaint: Nataly Kee is an 67 year old woman who presents for preventive health visit.      Besides routine health maintenance,  she would like to discuss :.     Recheck depression    Successful long term weight loss  Going back to weight watchers    Health Care Maintenance   Colonoscopy due in July 2020 (adenomatous polyps)- reviewed letter she received from Dr Austin for recall  dexa 2016-T score femoral neck -1.3     Healthy Habits:  Do you get at least three servings of calcium containing foods daily (dairy, green leafy vegetables, etc.)? Yes  Takes vitamin D   Outside of work or daily activities, how many days per week do you exercise for 30 minutes or longer? [employer]: pilates machines, weight training, walk to bus stop  Have you had an eye exam in the past two years? yes  Do you see a dentist twice per year? yes    PHQ-2  Over the last two weeks- Have you been bothered by little interest or pleasure in doing things?  No  Over the last two weeks- Have you been feeling down, depressed, or hopeless?  No    PHQ9 scores 0  Discussion of trial of every other day dosing  Has tried to discontinue fluoxetine years ago- negative thoughts when off SSRI  She may try every other day dosing, continue her commitment to exercise, socializing with friends, family    Advanced directive: completed    COGNITIVE SCREEN  1) Repeat 3 items (Banana, Sunrise, Chair)    2) Clock draw: NORMAL  3) 3 item recall: Recalls 3 objects  Results: 3 items recalled: COGNITIVE IMPAIRMENT LESS LIKELY    Mini-CogTM Copyright S Francesca. Licensed by the author for use in [facility]; reprinted with permission (shea@.[facility]). All rights reserved.        Social History   Substance Use Topics     Smoking status: Never Smoker     Smokeless tobacco: Never Used     Alcohol use No     The patient does not drink >3 drinks per day nor >7 drinks per week.    Reviewed orders with patient.  Reviewed health maintenance and updated orders  "accordingly - Yes      History of abnormal Pap smear: NO - age 64 - see link Cervical Cytology Screening Guidelines  All Histories reviewed and updated in [facility].      ROS:  CONSTITUTIONAL: NEGATIVE for fever, chills, change in weight  INTEGUMENTARY/SKIN: NEGATIVE for worrisome rashes, moles or lesions  EYES: NEGATIVE for vision changes or irritation  ENT: NEGATIVE for ear, mouth and throat problems  RESP: NEGATIVE for significant cough or SOB  BREAST: NEGATIVE for masses, tenderness or discharge  CV: NEGATIVE for chest pain, palpitations or peripheral edema  GI: NEGATIVE for nausea, abdominal pain, heartburn, or change in bowel habits  : NEGATIVE for unusual urinary or vaginal symptoms. No vaginal bleeding.  MUSCULOSKELETAL: NEGATIVE for significant arthralgias or myalgia  NEURO: NEGATIVE for weakness, dizziness or paresthesias  PSYCHIATRIC: NEGATIVE for changes in mood or affect       OBJECTIVE:  /78 (BP Location: Right arm, Patient Position: Sitting, Cuff Size: Adult Regular)  Pulse 82  Temp 97.8  F (36.6  C) (Oral)  Ht 1.626 m (5' 4\")  Wt 75.8 kg (167 lb)  LMP 09/03/2001  SpO2 95%  BMI 28.67 kg/m2  General appearance: Healthy    Skin: Normal. No atypical appearing moles on inspection of trunk and extremities.    External ears  and canals clear bilaterally. TM's normal bilaterally. Nose normal without lesions or discharge. Oropharynx normal. Neck supple without palpable adenopathy.    Breasts are symmetric.  No dominant, discrete, fixed  or suspicious masses are noted.  No skin or nipple changes or axillary nodes.      Regular rate and  rhythm. S1 and S2 normal, no murmurs, clicks, gallops or rubs. No edema or JVD. Chest is clear; no wheezes or rales.    The abdomen is soft without tenderness, guarding, mass or organomegaly. Bowel sounds are normal. No CVA tenderness or inguinal adenopathy noted.    Pelvic:  Deferred    Rectal exam:Deferred  Extremities: negative.      COUNSELING:  Reviewed " "will be ready to discharge from the program when: complete below SMART goals  SMART goals/tasks:  Check in on safety status daily in group  Follow safety plan  Practice a grounding activity  Progress updates:  10/14/24: No SI in last three wks  11/7 Goal stopped due to discharge.  Denies SI and SIB at time of discharge.    GOAL AREA 2: Symptom Management      Goal Status: Stopped   Description:   suicidal thoughts, emotion instability, unhelpful or intrusive thoughts, negative self-talk, difficulty with sleep, low motivation, and difficulty task completion  While in program I would like to accomplish: \"Do things give me manny and peace\"   I will know I am making progress when: \"I am able to get through the day without breakdowns\"   I think I will be ready to discharge from the- program when: complete below SMART goals  SMART goals/tasks:  Take medications regularly  Continue going to her friend's farm weekly on Thursdays to engage with the animals.  10/14 sleep 8+ hours  Progress updates:  Purchased med box  Made appointment with psychiatrist  10/14 being present to my emotions and learning container visualization and opposite to emotion  11/7 goal stopped due to discharge.  She continues to use mindfulness and distress tolerance strategies to reduce symptoms.    GOAL AREA 3: Daily Life Skills         Goal Status: Stopped   Description:   structure and routine    While in program I would like to accomplish: \"create a repetitive structure for my day\"  I will know I am making progress when: \"I do this multiple times in a row\"  I think I will be ready to discharge from the program when: \"I have energy and motivation for this\"  SMART goals/tasks:  Write out a routine: do not take on too much/designate/habit stacking  Eat breakfast to am pills/water  Progress updates:  Te[employer] list  10/14 have written a routine and habit stacking  11/7 goal stopped due to discharge. She continues to practice daily routine.    GOAL " "AREA 4: Wellness  Goal Status: Stopped   Description:   physical activity/movement and eating    While in program I would like to accomplish: \"eat three meals/day and walk around the block with Jude, keep balance\"  I will know I am making progress when: \"I can do this multiple times\"  I think I will be ready to discharge from the program when: \"I have established routine\"  SMART goals/tasks:  Plan meals/groceries on Fri  Set a timer in phone for walk  Say no without guilt: identify parameters and say yes to safe friends  Time management skills  Yoga 2x/wk  Walk Jude daily  Progress updates:  10/14 cook more regularly, bake, have energy and more mindful of being outside  11/7 goal stopped due to discharge.  She recently set a boundary with a support.  She maintains that she has more energy to engage in her desired wellness activities.    GOAL AREA 5: Aftercare Plan  Goal Status: Stopped   Description:   return to individual therapy, outpatient psychiatry/medication management  Psychiatrist: Jamie Yadav  Therapist: Rachel Franklin in [place], [place]  After program is completed I will: Return to individual therapy; work  I will know I am making progress when: complete below SMART goals  I think I will be ready to discharge from the program when: complete below SMART goals  SMART goals/tasks:  10/14 make plan for return to work  10/14 Share discharge plan with therapist  Progress updates:  10/14 meeting with therapist wkly and have shared my plan with her  11/7 goal stopped due to discharge. She meets with her therapist tomorrow and her psychiatrist next month.    GOAL AREA 6: Other Goal(s); Substance Use, Cultural Needs, and/or Something Else I Need  Goal Status: Stopped   Description:   Approach milestones/birthdays/holidays with a plan and healthy boundaries  While in program I would like to accomplish: \"Approach milestones/birthdays/holidays with a plan and healthy boundaries\"  I " preventive health counseling, as reflected in patient instructions       Regular exercise       Healthy diet/nutrition       Osteoporosis Prevention/Bone Health       Advance Care Planning    ATP III Guidelines   ICSI Preventive Guidelines    ASSESSMENT/PLAN:  (Z00.00) Routine history and physical examination of adult  (primary encounter diagnosis)  Comment:   Plan: Glucose Fasting (BFP), Lipid Profile,         HEMOGRAM/PLATELET (BFP), VENOUS COLLECTION            (Z23) Need for vaccination  Comment:   Plan: TDAP VACCINE (BOOSTRIX), VACCINE ADMINISTRATION        NASAL/ORAL            (F32.5) Major depression in remission (H)  Comment:   Plan: FLUoxetine (PROZAC) 20 MG capsule             "will know I am making progress when: \"I do not panic when thinking about these\"  I think I will be ready to discharge from the program when: \"I feel secure in my plan\"  SMART goals/tasks:  Do not run away/hide on special occasions  Create a list of new traditions  Press into radically accepting the feelings  Progress updates:  11/7 she reports applying skills learned this week including radical acceptance and cope ahead.    My Strengths:   \"I am a very empathetic and compassionate person\" ; \"Curiosity, willingness to learn\"  Ways I can use these to further my goals: \"Give [compassion] to myself while I'm here, allow myself to receive it\".    My Limitations or Vulnerabilities:  Anxiety  Depressive symptoms   Trauma/Abuse/Neglect  How I might need to modify my plans to account for my limits: N/A    My Other Health Issues:  None reported    Supports:    I want to include the following support people in my treatment: , psychiatrist, therapist.  Will sign ANDI for psychiatrist and therapist - would like us to send copy of treatment plans.    Please note we cannot share information with anyone unless you sign a release of information. You can specify what information can and cannot be shared and you can retract that written permission at any time.    Staff and support people can help me by: Patient left blank    Cultural Values and Needs: Patient left blank    Assessments: The following assessments were completed by patient for this visit:  PHQ9:       8/3/2023    10:42 AM 9/4/2024    11:30 AM   PHQ-9 SCORE   PHQ-9 Total Score 4 27     GAD7:       9/4/2024    11:30 AM   JANES-7 SCORE   Total Score 18        Diagnosis/es:  296.33 (F33.2) Major Depressive Disorder, Recurrent Episode, Severe _ and With anxious distress.     Level of Care: Adult Mental Health Outpatient Programs  Program Track: IOP 1    Multidisciplinary Team Members: Team IOP 1: Hudson Camarena MD; Mela Olsen [facility]; [facility]    Program " services: Group and Individual Psychotherapy (at least 1 hour per day), Patient Education and Training Related to Treatment (at least one hour per day), Psychiatric Evaluation and Management (at intake and least once per month)    Staff Interventions:  Assist to identify treatment goals, including identifying and problem-solving barriers. Assist in identifying strengths and how to mobilize them in meeting treatment goals. Assist in identifying limitations and other health concerns, and ways to manage those in the recovery process and beyond. Assist in identifying and helping to establish, maintain, and strengthen support network. Assist with and facilitate discharge planning, including connection with available and appropriate community services.      Ongoing psychotherapeutic and multidisciplinary assessment. Regular meetings with psychiatrist or other independent psychiatric provider. Assessment by registered nurse liaisons at admission and ongoing/as needed. Complete OT assessment where applicable/as needed. Complete functional assessment(s) where applicable/as needed.    Plan for and help with maintaining safety, including revising and updating written safety plan where applicable. Assist in identifying and applying coping skills. Assist in identifying and problem solving barriers to treatment and clinical progress. Utilize motivational interviewing techniques to promote change. Provide education to promote informed decisions and decision-making. Utilize motivational interviewing techniques to promote change.     Provide education regarding how to effectively use group process. Teach skills to help identify and manage thoughts, behaviors, and emotions, with specific skills/topics including: emotional regulation, identification of values, understanding and modifying distorted thinking, understanding and coping with grief and loss, shame, self-compassion, self-awareness, mindfulness, radical acceptance, distress  tolerance skills, hope, problem-solving, communication, interpersonal effectiveness, distress tolerance. Facilitate increased self-awareness.       Provide education regarding: life balance/structure/routine, goal setting and integration, prioritizing and planning, leisure values, behavior activation, motivation, energy management, stress management, neuroscience of change, sensory modulation, window of tolerance, ANS and vagus nerve activation, sensory enhanced mindfulness, sensory/body based grounding skills.    Provide education regarding: eight dimensions of wellness, sleep hygiene, medication education and management, stigma, nutrition, signs and symptoms, community resources, support network education.       Abuse Prevention Plan:   Treatment team will provide education regarding skill development to address symptom management, life skills, wellness, discharge planning, and personal safety.  Collaborate with patients internal and external providers to coordinate care.  Treatment will be provided in a safe, therapeutic environment.  Program provider will offer medication adjustment/management as needed/indicated.     Patient Participation in Plan:  This plan was developed by the patient named at the top of the document with assistance from the multidisciplinary care team. The patient agrees with this care plan, developed goals, and has received a copy. Supports and/or family have been invited to participate in the creation of this plan at the discretion of the patient.     Discharge Criteria:   Patient will discharge from the program when the goals above have been met, the patient is otherwise deemed to no longer need and/or benefit from the program/this level of care, another treatment modality is identified that would better meet treatment needs and goals, and/or the patient opts to discharge from the program for any reason.    Acknowledgement of Current Treatment Plan       I have reviewed my treatment plan  "with my treatment team members.  I agree with the plan as it is written in the electronic health record.     Name:                   Signature:                                                          Date:  Lora Camarena MD  Psychiatrist/Medical Director         NOTE: Patient signatures are completed manually and scanned into the electronic medical record. See \"Media\" tab in epic.        "

## 2024-10-07 ENCOUNTER — OFFICE VISIT (OUTPATIENT)
Dept: CARDIOLOGY | Facility: CLINIC | Age: 74
End: 2024-10-07
Payer: COMMERCIAL

## 2024-10-07 VITALS
SYSTOLIC BLOOD PRESSURE: 115 MMHG | WEIGHT: 208.4 LBS | HEART RATE: 63 BPM | DIASTOLIC BLOOD PRESSURE: 78 MMHG | HEIGHT: 64 IN | BODY MASS INDEX: 35.58 KG/M2

## 2024-10-07 DIAGNOSIS — E78.2 MIXED HYPERLIPIDEMIA: ICD-10-CM

## 2024-10-07 DIAGNOSIS — I47.19 PAROXYSMAL ATRIAL TACHYCARDIA (H): ICD-10-CM

## 2024-10-07 DIAGNOSIS — Z86.711 HISTORY OF PULMONARY EMBOLISM: ICD-10-CM

## 2024-10-07 DIAGNOSIS — I25.10 CORONARY ARTERY CALCIFICATION SEEN ON CT SCAN: Primary | ICD-10-CM

## 2024-10-07 PROCEDURE — 93000 ELECTROCARDIOGRAM COMPLETE: CPT | Performed by: NURSE PRACTITIONER

## 2024-10-07 PROCEDURE — 99214 OFFICE O/P EST MOD 30 MIN: CPT | Performed by: NURSE PRACTITIONER

## 2024-10-07 RX ORDER — ATORVASTATIN CALCIUM 80 MG/1
80 TABLET, FILM COATED ORAL DAILY
Qty: 90 TABLET | Refills: 3 | Status: SHIPPED | OUTPATIENT
Start: 2024-10-07 | End: 2024-11-04

## 2024-10-07 NOTE — PATIENT INSTRUCTIONS
Thanks for participating in a office visit with the [facility] today.    Doing well on a cardiac standpoint   Blood pressures well controlled   Recent lipid panel showed LDL not at goal.   Recommend increasing atorvastatin to 80 mg daily.   Follow up fasting lipid panel in 2 months.   Encourage continued exercise, weight loss, and strict low sodium diet     Follow up in 1 year with SONIDO     Please call my nurse at   [phone]. Call with any questions or concerns.    Scheduling phone number: [phone]  Reminder: Please bring in all current medications, over the counter supplements and vitamin bottles to your next appointment.

## 2024-10-07 NOTE — PROGRESS NOTES
CARDIOLOGY CLINIC NOTE    PRIMARY CARDIOLOGIST  Dr. Martin     PRIMARY CARE PHYSICIAN:  Manuela Barone    HISTORY OF PRESENT ILLNESS:  Nataly Kee is a very pleasant 74-year-old female with a past medical history significant for PE on chronic anticoagulation, PAF, atrial tachycardia, dyslipidemia and obesity.    In review, patient was diagnosed with a pulmonary embolism February 2022.  CT showed acute pulmonary embolism, large embolic burden beginning centrally with the evidence of elevated right heart pressures.    Echocardiogram 2/2022 showed a normal ejection fraction estimated at 60 to 65%, no regional wall motion abnormalities, a mildly dilated RV and mild to moderately reduced RV systolic function.  There was mild tricuspid regurgitation with severely abated pulmonary artery systolic pressure approximately 70 mmHg.     Echocardiogram 4/2022 showed a normal ejection fraction, no regional wall motion abnormalities, normal RA pressures of 3 mmHg and normal RV size and function.    Stress echocardiogram on 5/2023 showed normal wall motion.  With peak stress there was mild hypokinesis of the inferior lateral wall.  The LV cavity was more dilated following stress compared to baseline concerning for multivessel CAD.    CT coronary angiogram showed a total calcium score of 128.  There was mild LAD and RCA disease.    She returns to the office today for an annual follow-up.  She remains stable on a cardiac standpoint, denies chest pain, shortness of breath, palpitations, PND, orthopnea, presyncope or syncope.  Her primary complaint is her weight despite working out at least 3 times per week.     EKG shows NSR with rate of 62     Blood pressure is excellent at 115/78  Last CMP was unremarkable with the exception of a mildly elevated bilirubin of 1.5  CBC is unremarkable  Last lipid panel showed total cholesterol 168, triglycerides 150, HDL 42 and LDL 96    Compliant with all medications     PAST MEDICAL  HISTORY:  Past Medical History:   Diagnosis Date    Antiplatelet or antithrombotic long-term use     Arrhythmia     Depression     Depressive disorder     GERD (gastroesophageal reflux disease)     Sleep apnea        MEDICATIONS:  Current Outpatient Medications   Medication Sig Dispense Refill    apixaban ANTICOAGULANT (ELIQUIS) 5 MG tablet Take 1 tablet (5 mg) by mouth 2 times daily 180 tablet 0    atorvastatin (LIPITOR) 80 MG tablet Take 1 tablet (80 mg) by mouth daily. 90 tablet 3    Cholecalciferol (VITAMIN D) 125 MCG (5000 UT) capsule Take 1 capsule by mouth daily 2000 international unit(s)      ferrous sulfate (SLO-FE) 142 (45 Fe) MG CR tablet Take 142 mg by mouth daily      FLUoxetine (PROZAC) 20 MG capsule TAKE 1 CAPSULE BY MOUTH EVERY DAY 90 capsule 3    metoprolol succinate ER (TOPROL XL) 25 MG 24 hr tablet Take 1 tablet (25 mg) by mouth 2 times daily 180 tablet 0    omeprazole (PRILOSEC) 20 MG DR capsule Take 1 capsule (20 mg) by mouth daily 90 capsule 3    valACYclovir (VALTREX) 1000 mg tablet Take 2,000 mg by mouth as needed (Cold sores)       No current facility-administered medications for this visit.       SOCIAL HISTORY:  I have reviewed this patient's social history and updated it with pertinent information if needed. Nataly Kee  reports that she quit smoking about 49 years ago. Her smoking use included cigarettes. She has been exposed to tobacco smoke. She has never used smokeless tobacco. She reports that she does not currently use alcohol. She reports that she does not use drugs.    PHYSICAL EXAM:  Pulse:  [63] 63  BP: (115)/(78) 115/78  208 lbs 6.4 oz    Constitutional: alert, no distress  Respiratory: Good bilateral air entry  Cardiovascular: Regular rate and rhythm   GI: nondistended  Neuropsychiatric: appropriate affact    ASSESSMENT/PLAN:  Pertinent issues addressed/ reviewed during this cardiology visit  Unprevoked PE - On chronic apixaban.   PAT - Well controlled on Toprol. We did  discuss possible decrease in Toprol to daily d/t weight gain. She would like to continue current medical therapy for now.   Coronary artery disease -CT coronary angiogram showed a total calcium score of 128.  There was mild LAD and RCA disease. Continue statin.   Hyperlipidemia - LDL not at goal ( < 55) . Recommend increasing atorvastatin to 80 mg daily. Follow up fasting lipid panel in 2 months.   Obesity. - Encourage continued exercise and weight loss.     Follow up in 1 year with SONIDO or sooner if needed.     It was a pleasure seeing this patient in clinic today. Please do not hesitate to contact me with any future questions.     WESLY Eason, CNP  Cardiology - [facility]  10/07/2024       The level of medical decision making during this visit was of moderate complexity.    This note was completed in part using dictation via the Dragon voice recognition software. Some word and grammatical errors may occur and must be interpreted in the appropriate clinical context.  If there are any questions pertaining to this issue, please contact me for further clarification.

## 2024-10-07 NOTE — LETTER
10/7/2024    Manuela Barone, RUBA  [address], [address]  [facility] [address]    RE: Nataly Kee       Dear Colleague,     I had the pleasure of seeing Nataly Kee in the [facility].  CARDIOLOGY CLINIC NOTE    PRIMARY CARDIOLOGIST  Dr. Martin     PRIMARY CARE PHYSICIAN:  Manuela Barone    HISTORY OF PRESENT ILLNESS:  Nataly Kee is a very pleasant 74-year-old female with a past medical history significant for PE on chronic anticoagulation, PAF, atrial tachycardia, dyslipidemia and obesity.    In review, patient was diagnosed with a pulmonary embolism February 2022.  CT showed acute pulmonary embolism, large embolic burden beginning centrally with the evidence of elevated right heart pressures.    Echocardiogram 2/2022 showed a normal ejection fraction estimated at 60 to 65%, no regional wall motion abnormalities, a mildly dilated RV and mild to moderately reduced RV systolic function.  There was mild tricuspid regurgitation with severely abated pulmonary artery systolic pressure approximately 70 mmHg.     Echocardiogram 4/2022 showed a normal ejection fraction, no regional wall motion abnormalities, normal RA pressures of 3 mmHg and normal RV size and function.    Stress echocardiogram on 5/2023 showed normal wall motion.  With peak stress there was mild hypokinesis of the inferior lateral wall.  The LV cavity was more dilated following stress compared to baseline concerning for multivessel CAD.    CT coronary angiogram showed a total calcium score of 128.  There was mild LAD and RCA disease.    She returns to the office today for an annual follow-up.  She remains stable on a cardiac standpoint, denies chest pain, shortness of breath, palpitations, PND, orthopnea, presyncope or syncope.  Her primary complaint is her weight despite working out at least 3 times per week.     Blood pressure is excellent at 115/78  Last CMP was unremarkable with the exception of a mildly elevated  bilirubin of 1.5  CBC is unremarkable  Last lipid panel showed total cholesterol 168, triglycerides 150, HDL 42 and LDL 96    Compliant with all medications     PAST MEDICAL HISTORY:  Past Medical History:   Diagnosis Date     Antiplatelet or antithrombotic long-term use      Arrhythmia      Depression      Depressive disorder      GERD (gastroesophageal reflux disease)      Sleep apnea        MEDICATIONS:  Current Outpatient Medications   Medication Sig Dispense Refill     apixaban ANTICOAGULANT (ELIQUIS) 5 MG tablet Take 1 tablet (5 mg) by mouth 2 times daily 180 tablet 0     atorvastatin (LIPITOR) 80 MG tablet Take 1 tablet (80 mg) by mouth daily. 90 tablet 3     Cholecalciferol (VITAMIN D) 125 MCG (5000 UT) capsule Take 1 capsule by mouth daily 2000 international unit(s)       ferrous sulfate (SLO-FE) 142 (45 Fe) MG CR tablet Take 142 mg by mouth daily       FLUoxetine (PROZAC) 20 MG capsule TAKE 1 CAPSULE BY MOUTH EVERY DAY 90 capsule 3     metoprolol succinate ER (TOPROL XL) 25 MG 24 hr tablet Take 1 tablet (25 mg) by mouth 2 times daily 180 tablet 0     omeprazole (PRILOSEC) 20 MG DR capsule Take 1 capsule (20 mg) by mouth daily 90 capsule 3     valACYclovir (VALTREX) 1000 mg tablet Take 2,000 mg by mouth as needed (Cold sores)       No current facility-administered medications for this visit.       SOCIAL HISTORY:  I have reviewed this patient's social history and updated it with pertinent information if needed. Nataly Kee  reports that she quit smoking about 49 years ago. Her smoking use included cigarettes. She has been exposed to tobacco smoke. She has never used smokeless tobacco. She reports that she does not currently use alcohol. She reports that she does not use drugs.    PHYSICAL EXAM:  Pulse:  [63] 63  BP: (115)/(78) 115/78  208 lbs 6.4 oz    Constitutional: alert, no distress  Respiratory: Good bilateral air entry  Cardiovascular: Regular rate and rhythm   GI: nondistended  Neuropsychiatric:  appropriate affact    ASSESSMENT/PLAN:  Pertinent issues addressed/ reviewed during this cardiology visit  Unprevoked PE - On chronic apixaban.   PAT - Well controlled on Toprol. We did discuss possible decrease in Toprol to daily d/t weight gain. She would like to continue current medical therapy for now.   Coronary artery disease -CT coronary angiogram showed a total calcium score of 128.  There was mild LAD and RCA disease. Continue statin.   Hyperlipidemia - LDL not at goal ( < 55) . Recommend increasing atorvastatin to 80 mg daily. Follow up fasting lipid panel in 2 months.   Obesity. - Encourage continued exercise and weight loss.     Follow up in 1 year with SONIDO or sooner if needed.     It was a pleasure seeing this patient in clinic today. Please do not hesitate to contact me with any future questions.     WESLY Eason, CNP  Cardiology - [facility]  10/07/2024       The level of medical decision making during this visit was of moderate complexity.    This note was completed in part using dictation via the Dragon voice recognition software. Some word and grammatical errors may occur and must be interpreted in the appropriate clinical context.  If there are any questions pertaining to this issue, please contact me for further clarification.      Thank you for allowing me to participate in the care of your patient.      Sincerely,     WESLY Eason CNP     [facility]  cc:   Alphonso Martin MD  [address]  [place] [id number]

## 2024-10-21 DIAGNOSIS — I47.19 ATRIAL TACHYCARDIA (H): ICD-10-CM

## 2024-10-21 RX ORDER — METOPROLOL SUCCINATE 25 MG/1
25 TABLET, EXTENDED RELEASE ORAL 2 TIMES DAILY
Qty: 180 TABLET | Refills: 4 | Status: SHIPPED | OUTPATIENT
Start: 2024-10-21

## 2024-11-04 DIAGNOSIS — E78.2 MIXED HYPERLIPIDEMIA: ICD-10-CM

## 2024-11-04 RX ORDER — ATORVASTATIN CALCIUM 80 MG/1
80 TABLET, FILM COATED ORAL DAILY
Qty: 90 TABLET | Refills: 0 | Status: SHIPPED | OUTPATIENT
Start: 2024-11-04

## 2024-11-05 DIAGNOSIS — Z86.711 HISTORY OF PULMONARY EMBOLISM: ICD-10-CM

## 2024-12-16 ENCOUNTER — LAB (OUTPATIENT)
Dept: LAB | Facility: CLINIC | Age: 74
End: 2024-12-16
Payer: COMMERCIAL

## 2024-12-16 DIAGNOSIS — E78.2 MIXED HYPERLIPIDEMIA: ICD-10-CM

## 2024-12-16 LAB
CHOLEST SERPL-MCNC: 160 MG/DL
FASTING STATUS PATIENT QL REPORTED: YES
HDLC SERPL-MCNC: 38 MG/DL
LDLC SERPL CALC-MCNC: 86 MG/DL
NONHDLC SERPL-MCNC: 122 MG/DL
TRIGL SERPL-MCNC: 179 MG/DL

## 2024-12-16 PROCEDURE — 80061 LIPID PANEL: CPT | Performed by: NURSE PRACTITIONER

## 2024-12-16 PROCEDURE — 36415 COLL VENOUS BLD VENIPUNCTURE: CPT | Performed by: NURSE PRACTITIONER

## 2024-12-18 ENCOUNTER — MYC MEDICAL ADVICE (OUTPATIENT)
Dept: CARDIOLOGY | Facility: CLINIC | Age: 74
End: 2024-12-18
Payer: COMMERCIAL

## 2024-12-18 DIAGNOSIS — I25.10 CORONARY ARTERY CALCIFICATION SEEN ON CT SCAN: ICD-10-CM

## 2024-12-18 DIAGNOSIS — E78.5 DYSLIPIDEMIA: ICD-10-CM

## 2024-12-18 DIAGNOSIS — E78.2 MIXED HYPERLIPIDEMIA: Primary | ICD-10-CM

## 2024-12-18 RX ORDER — EZETIMIBE 10 MG/1
10 TABLET ORAL DAILY
Qty: 90 TABLET | Refills: 1 | Status: SHIPPED | OUTPATIENT
Start: 2024-12-18 | End: 2024-12-18

## 2024-12-18 RX ORDER — EZETIMIBE 10 MG/1
10 TABLET ORAL DAILY
Qty: 90 TABLET | Refills: 1 | Status: SHIPPED | OUTPATIENT
Start: 2024-12-18

## 2024-12-18 NOTE — TELEPHONE ENCOUNTER
"Sent message to pt via [employer]. Sent in new script for Zetia and labs for 3 months.     Per Belinda Yoder CNP,  \"LDL is not at goal ( <55) recommend adding ezetimibe 10 mg daily to atorvastatin.  FLP in 3 months.\"    Becki RIGGS  [facility]    "

## 2025-02-21 DIAGNOSIS — G47.33 OBSTRUCTIVE SLEEP APNEA (ADULT) (PEDIATRIC): Primary | ICD-10-CM

## 2025-03-18 ENCOUNTER — LAB (OUTPATIENT)
Dept: LAB | Facility: CLINIC | Age: 75
End: 2025-03-18
Payer: COMMERCIAL

## 2025-03-18 DIAGNOSIS — E78.2 MIXED HYPERLIPIDEMIA: ICD-10-CM

## 2025-03-18 DIAGNOSIS — I25.10 CORONARY ARTERY CALCIFICATION SEEN ON CT SCAN: ICD-10-CM

## 2025-03-18 DIAGNOSIS — E78.5 DYSLIPIDEMIA: ICD-10-CM

## 2025-03-18 LAB
CHOLEST SERPL-MCNC: 115 MG/DL
FASTING STATUS PATIENT QL REPORTED: YES
HDLC SERPL-MCNC: 39 MG/DL
LDLC SERPL CALC-MCNC: 58 MG/DL
NONHDLC SERPL-MCNC: 76 MG/DL
TRIGL SERPL-MCNC: 91 MG/DL

## 2025-03-18 PROCEDURE — 80061 LIPID PANEL: CPT | Performed by: NURSE PRACTITIONER

## 2025-03-18 PROCEDURE — 36415 COLL VENOUS BLD VENIPUNCTURE: CPT | Performed by: NURSE PRACTITIONER

## 2025-03-22 ENCOUNTER — HEALTH MAINTENANCE LETTER (OUTPATIENT)
Age: 75
End: 2025-03-22

## 2025-04-15 ENCOUNTER — MYC REFILL (OUTPATIENT)
Dept: CARDIOLOGY | Facility: CLINIC | Age: 75
End: 2025-04-15
Payer: COMMERCIAL

## 2025-04-15 DIAGNOSIS — E78.2 MIXED HYPERLIPIDEMIA: ICD-10-CM

## 2025-04-15 RX ORDER — ATORVASTATIN CALCIUM 80 MG/1
80 TABLET, FILM COATED ORAL DAILY
Qty: 90 TABLET | Refills: 1 | Status: SHIPPED | OUTPATIENT
Start: 2025-04-15

## 2025-06-03 DIAGNOSIS — I25.10 CORONARY ARTERY CALCIFICATION SEEN ON CT SCAN: ICD-10-CM

## 2025-06-03 DIAGNOSIS — E78.5 DYSLIPIDEMIA: ICD-10-CM

## 2025-06-03 DIAGNOSIS — E78.2 MIXED HYPERLIPIDEMIA: ICD-10-CM

## 2025-06-03 RX ORDER — EZETIMIBE 10 MG/1
10 TABLET ORAL DAILY
Qty: 90 TABLET | Refills: 1 | Status: SHIPPED | OUTPATIENT
Start: 2025-06-03

## 2025-07-16 DIAGNOSIS — K21.9 GASTROESOPHAGEAL REFLUX DISEASE WITHOUT ESOPHAGITIS: ICD-10-CM

## 2025-07-16 DIAGNOSIS — F32.5 MAJOR DEPRESSION IN REMISSION: ICD-10-CM

## 2025-07-17 RX ORDER — OMEPRAZOLE 20 MG/1
20 CAPSULE, DELAYED RELEASE ORAL DAILY
COMMUNITY
Start: 2025-07-17

## 2025-07-17 NOTE — TELEPHONE ENCOUNTER
Nataly Kee is requesting a refill of:    Refused Prescriptions:                       Disp   Refills    omeprazole (PRILOSEC) 20 MG DR capsule [Ph*                Sig: TAKE 1 CAPSULE DAILY  Refused By: ABDOUL DE OLIVEIRA  Reason for Refusal: Patient needs appointment    FLUoxetine (PROZAC) 20 MG capsule [Pharmac*                Sig: TAKE 1 CAPSULE DAILY  Refused By: ABDOUL DE OLIVEIRA  Reason for Refusal: Patient needs appointment    Needs OV for refills

## 2025-07-23 ENCOUNTER — OFFICE VISIT (OUTPATIENT)
Dept: FAMILY MEDICINE | Facility: CLINIC | Age: 75
End: 2025-07-23

## 2025-07-23 VITALS
DIASTOLIC BLOOD PRESSURE: 84 MMHG | WEIGHT: 213.6 LBS | OXYGEN SATURATION: 95 % | HEART RATE: 75 BPM | HEIGHT: 63 IN | SYSTOLIC BLOOD PRESSURE: 126 MMHG | BODY MASS INDEX: 37.85 KG/M2 | TEMPERATURE: 97 F

## 2025-07-23 DIAGNOSIS — Z00.00 MEDICARE ANNUAL WELLNESS VISIT, SUBSEQUENT: ICD-10-CM

## 2025-07-23 DIAGNOSIS — Z13.1 SCREENING FOR DIABETES MELLITUS: ICD-10-CM

## 2025-07-23 DIAGNOSIS — M85.89 OSTEOPENIA OF MULTIPLE SITES: ICD-10-CM

## 2025-07-23 DIAGNOSIS — E78.2 MIXED HYPERLIPIDEMIA: Primary | ICD-10-CM

## 2025-07-23 DIAGNOSIS — F32.5 MAJOR DEPRESSION IN REMISSION: ICD-10-CM

## 2025-07-23 DIAGNOSIS — I25.10 NON-OCCLUSIVE CORONARY ARTERY DISEASE: ICD-10-CM

## 2025-07-23 DIAGNOSIS — K21.9 GASTROESOPHAGEAL REFLUX DISEASE WITHOUT ESOPHAGITIS: ICD-10-CM

## 2025-07-23 LAB
ALBUMIN SERPL-MCNC: 3.8 G/DL (ref 3.6–5.1)
ALP SERPL-CCNC: 85 U/L (ref 33–130)
ALT 1742-6: 21 U/L (ref 0–32)
AST 1920-8: 17 U/L (ref 0–35)
BILIRUB SERPL-MCNC: 1.3 MG/DL (ref 0.2–1.2)
BUN SERPL-MCNC: 11 MG/DL (ref 7–25)
BUN/CREATININE RATIO: 13 (ref 6–32)
CALCIUM SERPL-MCNC: 9.5 MG/DL (ref 8.6–10.3)
CHLORIDE SERPLBLD-SCNC: 106.5 MMOL/L (ref 98–110)
CO2 SERPL-SCNC: 22.6 MMOL/L (ref 20–32)
CREAT SERPL-MCNC: 0.83 MG/DL (ref 0.6–1.3)
GLUCOSE SERPL-MCNC: 108 MG/DL (ref 60–99)
HEMOGLOBIN A1C: 6.2 % (ref 4–5.6)
POTASSIUM SERPL-SCNC: 4.64 MMOL/L (ref 3.5–5.3)
PROT SERPL-MCNC: 6 G/DL (ref 6.1–8.1)
SODIUM SERPL-SCNC: 140.7 MMOL/L (ref 135–146)

## 2025-07-23 PROCEDURE — 36415 COLL VENOUS BLD VENIPUNCTURE: CPT | Performed by: PHYSICIAN ASSISTANT

## 2025-07-23 PROCEDURE — G0439 PPPS, SUBSEQ VISIT: HCPCS | Performed by: PHYSICIAN ASSISTANT

## 2025-07-23 PROCEDURE — 99213 OFFICE O/P EST LOW 20 MIN: CPT | Mod: 25 | Performed by: PHYSICIAN ASSISTANT

## 2025-07-23 PROCEDURE — 80053 COMPREHEN METABOLIC PANEL: CPT | Performed by: PHYSICIAN ASSISTANT

## 2025-07-23 PROCEDURE — 83036 HEMOGLOBIN GLYCOSYLATED A1C: CPT | Performed by: PHYSICIAN ASSISTANT

## 2025-07-23 RX ORDER — OMEPRAZOLE 20 MG/1
20 CAPSULE, DELAYED RELEASE ORAL DAILY
Qty: 90 CAPSULE | Refills: 3 | Status: SHIPPED | OUTPATIENT
Start: 2025-07-23

## 2025-07-23 ASSESSMENT — ANXIETY QUESTIONNAIRES
GAD7 TOTAL SCORE: 0
2. NOT BEING ABLE TO STOP OR CONTROL WORRYING: NOT AT ALL
GAD7 TOTAL SCORE: 0
7. FEELING AFRAID AS IF SOMETHING AWFUL MIGHT HAPPEN: NOT AT ALL
5. BEING SO RESTLESS THAT IT IS HARD TO SIT STILL: NOT AT ALL
3. WORRYING TOO MUCH ABOUT DIFFERENT THINGS: NOT AT ALL
1. FEELING NERVOUS, ANXIOUS, OR ON EDGE: NOT AT ALL
6. BECOMING EASILY ANNOYED OR IRRITABLE: NOT AT ALL

## 2025-07-23 ASSESSMENT — PATIENT HEALTH QUESTIONNAIRE - PHQ9
SUM OF ALL RESPONSES TO PHQ QUESTIONS 1-9: 0
5. POOR APPETITE OR OVEREATING: NOT AT ALL

## 2025-07-23 NOTE — PROGRESS NOTES
"CC: Medication Check    History:  Anxiety, Depression:  Takes fluoxetine 20 mg daily. No side effects. Feels like it is working well to control symptoms. Can tell symptoms worsen if she forgets to take. Not interested in decreasing dose.      GERD:  Stable on omeprazole 20 mg daily. Has tried lower dose in the past, but symptoms return. This works well to control symptoms.      Mixed hyperlipidemia, PAT, history PE, CAD:  Saw cardiology 10/2024 they increased atorvastatin to 80 mg. Was started on Zetia 12/2024. No side effects. Did have lipid profile updated 3/2025 and reasonably controlled.     PMH, MEDICATIONS, ALLERGIES, SOCIAL AND FAMILY HISTORY in [facility] and reviewed by me personally.    ROS negative other than the symptoms noted above in the HPI.     Examination   /84 (BP Location: Right arm, Patient Position: Sitting, Cuff Size: Adult Regular)   Pulse 75   Temp 97  F (36.1  C) (Temporal)   Ht 1.6 m (5' 3\")   Wt 96.9 kg (213 lb 9.6 oz)   LMP 09/03/2001   SpO2 95%   BMI 37.84 kg/m       Constitutional: Sitting comfortably, in no acute distress. Vital signs noted  Neck:  no adenopathy, trachea midline and normal to palpation, thyroid normal to palpation  Cardiovascular:  regular rate and rhythm, no murmurs, clicks, or gallops  Respiratory:  normal respiratory rate and rhythm, lungs clear to auscultation  SKIN: No jaundice/pallor/rash.   Psychiatric: mentation appears normal and affect normal/bright      A/P    ICD-10-CM    1. Mixed hyperlipidemia  E78.2       2. Major depression in remission  F32.5 FLUoxetine (PROZAC) 20 MG capsule      3. Gastroesophageal reflux disease without esophagitis  K21.9 omeprazole (PRILOSEC) 20 MG DR capsule      4. Osteopenia of multiple sites  M85.89 DX Bone Density     Radiology Referral (Affiliate Use Only)      5. Non-occlusive coronary artery disease  I25.10 VENOUS COLLECTION     Comprehensive Metobolic Panel (BFP)      6. Screening for diabetes mellitus  Z13.1 VENOUS " COLLECTION     HEMOGLOBIN A1C (BFP)          DISCUSSION:  Anxiety, Depression:  PHQ-9 and DENTON-7 updated today and well controlled. Agreed to refill current medication without change for 1 year. Contact us sooner with concerns, worsening.    GERD:  Working well to control symptoms. Will refill medication without change for 1 year.       Mixed hyperlipidemia, PAT, history PE, CAD:  Continue working with cardiology. Will update non-fasting CMP and A1c and send MyChart.     follow up visit: 1 year    Manuela Barone PA-C  [facility]

## 2025-07-23 NOTE — NURSING NOTE
Chief Complaint   Patient presents with    Recheck Medication     Non-fasting med check and refill     Medicare Visit     AWV     Pre-visit Screening:  Immunizations:  up to date  Colonoscopy:  is due and to be scheduled by patient for later completion  Mammogram: is up to date  Asthma Action Test/Plan:  na  PHQ9:  given  GAD7:  given  Questioned patient about current smoking habits Pt. has never smoked.  Ok to leave detailed message on voice mail for today's visit only yes, phone # [phone] (home)

## 2025-07-23 NOTE — PROGRESS NOTES
Nataly Kee is a 74 year old female who presents for Medicare Annual Wellness Visit.    Current providers caring for this patient include:  Patient Care Team:  Manuela Barone PA-C as PCP - General (Family Medicine)  Alphonso Martin MD as MD (Cardiovascular Disease)  Ashwin Saavedra MD as Assigned Surgical Provider  Manuela Barone PA-C as Assigned PCP  Belinda Yoder APRN CNP as Assigned Heart and Vascular Provider    Complete Medical and Social history reviewed with patient, outlined below.    Patient Active Problem List   Diagnosis    History of pulmonary embolism    Pure hypercholesterolemia    Major depression in remission    ACP (advance care planning)    Vitamin D insufficiency    History of colonic polyps - repeat colonoscopy 8/2025    History of basal cell carcinoma    Gastroesophageal reflux disease without esophagitis    Pulmonary embolism (H)    Subclinical hypothyroidism    Abnormal stress test    Mixed hyperlipidemia    Osteopenia- repeat 2024    Class 2 severe obesity due to excess calories with serious comorbidity in adult (H)    Anticardiolipin antibody positive- mildly positive- Dr. Martin- [facility]    Coronary artery calcification seen on CT scan    Paroxysmal atrial tachycardia       Past Medical History:   Diagnosis Date    Antiplatelet or antithrombotic long-term use     Arrhythmia     Depression     Depressive disorder     GERD (gastroesophageal reflux disease)     Sleep apnea        Past Surgical History:   Procedure Laterality Date    COLONOSCOPY  04/01/2015    tubular adenoma    EXCISE LESION TRUNK N/A 3/18/2024    Procedure: Excision of multiple back cysts;  Surgeon: Ashwin Saavedra MD;  Location: [facility] OR    HC REMOVAL OF TONSILS,12+ Y/O      age 20    IR PULMONARY ANGIOGRAM BILATERAL  2/17/2022    Z LIGATE FALLOPIAN TUBE  01/01/1983    ZZC VAGINAL HYSTERECTOMY  01/01/2001    Hysterectomy, Vaginal - fibroids    ZZ COLONOSCOPY THRU STOMA, DIAGNOSTIC   2010    3 mm polyp in descending colon/ Dr Asencio       Family History   Problem Relation Age of Onset    Coronary Artery Disease Mother     Heart Disease Mother         related to chemotherapy treatment; pacemaker    Cancer Mother         hodgekins disease    Cerebrovascular Disease Mother     Depression Mother     Coronary Artery Disease Father     Respiratory Father         emphysema    Heart Disease Father         abdominal aneurysm    Multiple Sclerosis Sister         relapsing praveen.     Hypertension Sister     Other - See Comments Sister         feet issues    Bipolar Disorder Daughter     Colon Cancer No family hx of        Social History     Tobacco Use    Smoking status: Former     Current packs/day: 0.00     Types: Cigarettes     Quit date: 1975     Years since quittin.5     Passive exposure: Past    Smokeless tobacco: Never   Substance Use Topics    Alcohol use: Not Currently       Diet: regular, low salt/low fat - trying to lose weight  Physical Activity: pilates reformer, boot camp on , exercise class on mon/thurs, 3-4/week   Depression Screen:    Over the past 2 weeks, patient has felt down, depressed, or hopeless:  phq9 given    Over the past 2 weeks, patient has felt little interest or pleasure in doing things: phq9 given    Functional ability/Safety screen:  Up and go test (able to get up and walk longer than 30 seconds): Passed  Patient needs assistance with: nothing, fully independent  Patient's home has the following possible safety concerns: none identified  Patient has concerns about her hearing:  No  Cognitive Screen  Patient repeats three objects (barbara, apple, table)      Clock drawing test:   NORMAL  Recalls three objects after 3 minutes (barbara, apple, table):                                                                                               recalls 3 objects (3 points)    Physical Exam:  /84 (BP Location: Right arm, Patient Position: Sitting, Cuff  "Size: Adult Regular)   Pulse 75   Temp 97  F (36.1  C) (Temporal)   Ht 1.6 m (5' 3\")   Wt 96.9 kg (213 lb 9.6 oz)   LMP 09/03/2001   SpO2 95%   BMI 37.84 kg/m     Body mass index is 37.84 kg/m .     GENERAL APPEARANCE: healthy, alert and no distress  PSYCH: Affect normal/bright.  Mentation within normal limits.  EYES: conjunctiva clear  HENT: ear canals and TM's normal.  Nose and mouth without ulcers, erythema or lesions  NECK: supple, nontender, no lymphadenopathy  RESP: lungs clear to auscultation - no rales, rhonchi or wheezes  CV: regular rates and rhythm, normal S1 S2, no murmur noted  ABDOMEN:  soft, nontender, no HSM or masses and bowel sounds normal  SKIN: no suspicious lesions or rashes    End of Life Planning:   Patient currently has an advanced directive: Yes.  Practitioner is supportive of decision.    Education/Counseling:   Based on review of the above information, the following items were addressed:      Obesity - appropriate counseling on diet, exercise, etc.    Appropriate preventive services were discussed with this patient, including applicable screening as appropriate for cardiovascular disease, diabetes, osteopenia/osteoporosis, and glaucoma.  As appropriate for age/gender, discussed screening for colorectal cancer, prostate cancer, breast cancer, and cervical cancer.       Manuela Barone PA-C  7/23/2025               "

## 2025-08-01 ENCOUNTER — HOSPITAL ENCOUNTER (OUTPATIENT)
Facility: CLINIC | Age: 75
Discharge: HOME | End: 2025-08-01
Attending: INTERNAL MEDICINE | Admitting: INTERNAL MEDICINE
Payer: COMMERCIAL

## 2025-08-01 VITALS
WEIGHT: 208 LBS | HEART RATE: 71 BPM | RESPIRATION RATE: 16 BRPM | SYSTOLIC BLOOD PRESSURE: 103 MMHG | OXYGEN SATURATION: 95 % | DIASTOLIC BLOOD PRESSURE: 65 MMHG | BODY MASS INDEX: 36.85 KG/M2

## 2025-08-01 LAB — COLONOSCOPY: NORMAL

## 2025-08-01 PROCEDURE — 88305 TISSUE EXAM BY PATHOLOGIST: CPT | Mod: TC | Performed by: INTERNAL MEDICINE

## 2025-08-01 PROCEDURE — 88305 TISSUE EXAM BY PATHOLOGIST: CPT | Mod: 26 | Performed by: PATHOLOGY

## 2025-08-01 PROCEDURE — 45385 COLONOSCOPY W/LESION REMOVAL: CPT | Mod: PT | Performed by: INTERNAL MEDICINE

## 2025-08-01 PROCEDURE — 250N000011 HC RX IP 250 OP 636: Performed by: INTERNAL MEDICINE

## 2025-08-01 PROCEDURE — G0500 MOD SEDAT ENDO SERVICE >5YRS: HCPCS | Mod: PT | Performed by: INTERNAL MEDICINE

## 2025-08-01 PROCEDURE — 45384 COLONOSCOPY W/LESION REMOVAL: CPT | Performed by: INTERNAL MEDICINE

## 2025-08-01 RX ORDER — NALOXONE HYDROCHLORIDE 0.4 MG/ML
0.2 INJECTION, SOLUTION INTRAMUSCULAR; INTRAVENOUS; SUBCUTANEOUS
Status: DISCONTINUED | OUTPATIENT
Start: 2025-08-01 | End: 2025-08-01 | Stop reason: HOSPADM

## 2025-08-01 RX ORDER — FLUMAZENIL 0.1 MG/ML
0.2 INJECTION, SOLUTION INTRAVENOUS
Status: DISCONTINUED | OUTPATIENT
Start: 2025-08-01 | End: 2025-08-01 | Stop reason: HOSPADM

## 2025-08-01 RX ORDER — ATROPINE SULFATE 0.1 MG/ML
1 INJECTION INTRAVENOUS
Status: DISCONTINUED | OUTPATIENT
Start: 2025-08-01 | End: 2025-08-01 | Stop reason: HOSPADM

## 2025-08-01 RX ORDER — ONDANSETRON 2 MG/ML
4 INJECTION INTRAMUSCULAR; INTRAVENOUS
Status: DISCONTINUED | OUTPATIENT
Start: 2025-08-01 | End: 2025-08-01 | Stop reason: HOSPADM

## 2025-08-01 RX ORDER — EPINEPHRINE 1 MG/ML
0.1 INJECTION, SOLUTION, CONCENTRATE INTRAVENOUS
Status: DISCONTINUED | OUTPATIENT
Start: 2025-08-01 | End: 2025-08-01 | Stop reason: HOSPADM

## 2025-08-01 RX ORDER — ONDANSETRON 2 MG/ML
4 INJECTION INTRAMUSCULAR; INTRAVENOUS EVERY 6 HOURS PRN
Status: DISCONTINUED | OUTPATIENT
Start: 2025-08-01 | End: 2025-08-01 | Stop reason: HOSPADM

## 2025-08-01 RX ORDER — ONDANSETRON 4 MG/1
4 TABLET, ORALLY DISINTEGRATING ORAL EVERY 6 HOURS PRN
Status: DISCONTINUED | OUTPATIENT
Start: 2025-08-01 | End: 2025-08-01 | Stop reason: HOSPADM

## 2025-08-01 RX ORDER — NALOXONE HYDROCHLORIDE 0.4 MG/ML
0.4 INJECTION, SOLUTION INTRAMUSCULAR; INTRAVENOUS; SUBCUTANEOUS
Status: DISCONTINUED | OUTPATIENT
Start: 2025-08-01 | End: 2025-08-01 | Stop reason: HOSPADM

## 2025-08-01 RX ORDER — SIMETHICONE 40MG/0.6ML
133 SUSPENSION, DROPS(FINAL DOSAGE FORM)(ML) ORAL
Status: DISCONTINUED | OUTPATIENT
Start: 2025-08-01 | End: 2025-08-01 | Stop reason: HOSPADM

## 2025-08-01 RX ORDER — FENTANYL CITRATE 50 UG/ML
25-100 INJECTION, SOLUTION INTRAMUSCULAR; INTRAVENOUS EVERY 5 MIN PRN
Refills: 0 | Status: DISCONTINUED | OUTPATIENT
Start: 2025-08-01 | End: 2025-08-01 | Stop reason: HOSPADM

## 2025-08-01 RX ORDER — PROCHLORPERAZINE MALEATE 5 MG/1
5 TABLET ORAL EVERY 6 HOURS PRN
Status: DISCONTINUED | OUTPATIENT
Start: 2025-08-01 | End: 2025-08-01 | Stop reason: HOSPADM

## 2025-08-01 RX ORDER — LIDOCAINE 40 MG/G
CREAM TOPICAL
Status: DISCONTINUED | OUTPATIENT
Start: 2025-08-01 | End: 2025-08-01 | Stop reason: HOSPADM

## 2025-08-01 RX ORDER — DIPHENHYDRAMINE HYDROCHLORIDE 50 MG/ML
25-50 INJECTION, SOLUTION INTRAMUSCULAR; INTRAVENOUS
Status: DISCONTINUED | OUTPATIENT
Start: 2025-08-01 | End: 2025-08-01 | Stop reason: HOSPADM

## 2025-08-01 RX ADMIN — FENTANYL CITRATE 50 MCG: 50 INJECTION INTRAMUSCULAR; INTRAVENOUS at 13:04

## 2025-08-01 RX ADMIN — MIDAZOLAM 1 MG: 1 INJECTION INTRAMUSCULAR; INTRAVENOUS at 13:04

## 2025-08-01 ASSESSMENT — ACTIVITIES OF DAILY LIVING (ADL)
ADLS_ACUITY_SCORE: 57
ADLS_ACUITY_SCORE: 57

## 2025-08-15 PROBLEM — D12.6 TUBULAR ADENOMA OF COLON: Status: ACTIVE | Noted: 2025-08-15

## 2025-08-24 ASSESSMENT — SLEEP AND FATIGUE QUESTIONNAIRES
HOW LIKELY ARE YOU TO NOD OFF OR FALL ASLEEP WHILE SITTING QUIETLY AFTER LUNCH WITHOUT ALCOHOL: SLIGHT CHANCE OF DOZING
HOW LIKELY ARE YOU TO NOD OFF OR FALL ASLEEP WHILE SITTING AND READING: SLIGHT CHANCE OF DOZING
HOW LIKELY ARE YOU TO NOD OFF OR FALL ASLEEP WHILE LYING DOWN TO REST IN THE AFTERNOON WHEN CIRCUMSTANCES PERMIT: MODERATE CHANCE OF DOZING
HOW LIKELY ARE YOU TO NOD OFF OR FALL ASLEEP WHILE SITTING INACTIVE IN A PUBLIC PLACE: WOULD NEVER DOZE
HOW LIKELY ARE YOU TO NOD OFF OR FALL ASLEEP WHEN YOU ARE A PASSENGER IN A CAR FOR AN HOUR WITHOUT A BREAK: WOULD NEVER DOZE
HOW LIKELY ARE YOU TO NOD OFF OR FALL ASLEEP WHILE SITTING AND TALKING TO SOMEONE: WOULD NEVER DOZE
HOW LIKELY ARE YOU TO NOD OFF OR FALL ASLEEP IN A CAR, WHILE STOPPED FOR A FEW MINUTES IN TRAFFIC: WOULD NEVER DOZE
HOW LIKELY ARE YOU TO NOD OFF OR FALL ASLEEP WHILE WATCHING TV: SLIGHT CHANCE OF DOZING

## 2025-08-27 ENCOUNTER — OFFICE VISIT (OUTPATIENT)
Dept: SLEEP MEDICINE | Facility: CLINIC | Age: 75
End: 2025-08-27
Payer: COMMERCIAL

## 2025-08-27 VITALS
DIASTOLIC BLOOD PRESSURE: 72 MMHG | HEIGHT: 63 IN | SYSTOLIC BLOOD PRESSURE: 110 MMHG | HEART RATE: 67 BPM | BODY MASS INDEX: 36.73 KG/M2 | OXYGEN SATURATION: 98 % | WEIGHT: 207.3 LBS

## 2025-08-27 DIAGNOSIS — G47.33 OSA (OBSTRUCTIVE SLEEP APNEA): Primary | ICD-10-CM

## 2025-08-27 PROCEDURE — 3074F SYST BP LT 130 MM HG: CPT | Performed by: PHYSICIAN ASSISTANT

## 2025-08-27 PROCEDURE — 3078F DIAST BP <80 MM HG: CPT | Performed by: PHYSICIAN ASSISTANT

## 2025-08-27 PROCEDURE — 99213 OFFICE O/P EST LOW 20 MIN: CPT | Performed by: PHYSICIAN ASSISTANT

## 2025-08-27 PROCEDURE — 1126F AMNT PAIN NOTED NONE PRSNT: CPT | Performed by: PHYSICIAN ASSISTANT

## (undated) DEVICE — PREP CHLORAPREP 26ML TINTED HI-LITE ORANGE 930815

## (undated) DEVICE — ESU GROUND PAD ADULT W/CORD E7507

## (undated) DEVICE — DECANTER BAG 2002S

## (undated) DEVICE — CLIP HEMOSTASIS ASSURANCE W16 MM BX00711884

## (undated) DEVICE — Device

## (undated) DEVICE — LINEN FULL SHEET 5511

## (undated) DEVICE — SOL NACL 0.9% INJ 1000ML BAG 07983-09

## (undated) DEVICE — GLOVE BIOGEL PI MICRO SZ 7.5 48575

## (undated) DEVICE — SYR EAR BULB 3OZ 0035830

## (undated) DEVICE — SU VICRYL 2-0 CT-2 27" J333H

## (undated) DEVICE — ENDO TRAP POLYP QUICK CATCH 710201

## (undated) DEVICE — PACK MINOR CUSTOM RIDGES SBA32RMRMA

## (undated) DEVICE — KIT ENDO TURNOVER/PROCEDURE W/CLEAN A SCOPE LINERS 103888

## (undated) DEVICE — LINEN TOWEL PACK X10 5473

## (undated) DEVICE — LINEN HALF SHEET 5512

## (undated) DEVICE — BAG CLEAR TRASH 1.3M 39X33" P4040C

## (undated) DEVICE — GOWN IMPERVIOUS ZONED XLG 9041

## (undated) DEVICE — DRAPE LAP W/ARMBOARD 29410

## (undated) DEVICE — GLOVE BIOGEL PI MICRO SZ 8.0 48580

## (undated) RX ORDER — PROPOFOL 10 MG/ML
INJECTION, EMULSION INTRAVENOUS
Status: DISPENSED
Start: 2024-03-18

## (undated) RX ORDER — HEPARIN SODIUM 1000 [USP'U]/ML
INJECTION, SOLUTION INTRAVENOUS; SUBCUTANEOUS
Status: DISPENSED
Start: 2022-02-17

## (undated) RX ORDER — METOPROLOL TARTRATE 50 MG
TABLET ORAL
Status: DISPENSED
Start: 2023-06-02

## (undated) RX ORDER — ONDANSETRON 2 MG/ML
INJECTION INTRAMUSCULAR; INTRAVENOUS
Status: DISPENSED
Start: 2022-02-17

## (undated) RX ORDER — FENTANYL CITRATE 50 UG/ML
INJECTION, SOLUTION INTRAMUSCULAR; INTRAVENOUS
Status: DISPENSED
Start: 2022-02-17

## (undated) RX ORDER — LIDOCAINE HYDROCHLORIDE 10 MG/ML
INJECTION, SOLUTION EPIDURAL; INFILTRATION; INTRACAUDAL; PERINEURAL
Status: DISPENSED
Start: 2024-03-18

## (undated) RX ORDER — FENTANYL CITRATE-0.9 % NACL/PF 10 MCG/ML
PLASTIC BAG, INJECTION (ML) INTRAVENOUS
Status: DISPENSED
Start: 2024-03-18

## (undated) RX ORDER — IVABRADINE 5 MG/1
TABLET, FILM COATED ORAL
Status: DISPENSED
Start: 2023-06-02

## (undated) RX ORDER — FENTANYL CITRATE 50 UG/ML
INJECTION, SOLUTION INTRAMUSCULAR; INTRAVENOUS
Status: DISPENSED
Start: 2024-03-18

## (undated) RX ORDER — CEFAZOLIN SODIUM/WATER 2 G/20 ML
SYRINGE (ML) INTRAVENOUS
Status: DISPENSED
Start: 2024-03-18

## (undated) RX ORDER — LIDOCAINE HYDROCHLORIDE 10 MG/ML
INJECTION, SOLUTION EPIDURAL; INFILTRATION; INTRACAUDAL; PERINEURAL
Status: DISPENSED
Start: 2022-02-17

## (undated) RX ORDER — GLYCOPYRROLATE 0.2 MG/ML
INJECTION, SOLUTION INTRAMUSCULAR; INTRAVENOUS
Status: DISPENSED
Start: 2024-03-18

## (undated) RX ORDER — FENTANYL CITRATE 50 UG/ML
INJECTION, SOLUTION INTRAMUSCULAR; INTRAVENOUS
Status: DISPENSED
Start: 2025-08-01

## (undated) RX ORDER — DEXAMETHASONE SODIUM PHOSPHATE 4 MG/ML
INJECTION, SOLUTION INTRA-ARTICULAR; INTRALESIONAL; INTRAMUSCULAR; INTRAVENOUS; SOFT TISSUE
Status: DISPENSED
Start: 2024-03-18

## (undated) RX ORDER — FENTANYL CITRATE 50 UG/ML
INJECTION, SOLUTION INTRAMUSCULAR; INTRAVENOUS
Status: DISPENSED
Start: 2020-08-04

## (undated) RX ORDER — NITROGLYCERIN 0.4 MG/1
TABLET SUBLINGUAL
Status: DISPENSED
Start: 2023-06-02

## (undated) RX ORDER — BUPIVACAINE HYDROCHLORIDE 5 MG/ML
INJECTION, SOLUTION EPIDURAL; INTRACAUDAL
Status: DISPENSED
Start: 2024-03-18